# Patient Record
Sex: MALE | Race: BLACK OR AFRICAN AMERICAN | NOT HISPANIC OR LATINO | URBAN - METROPOLITAN AREA
[De-identification: names, ages, dates, MRNs, and addresses within clinical notes are randomized per-mention and may not be internally consistent; named-entity substitution may affect disease eponyms.]

---

## 2021-07-23 ENCOUNTER — INPATIENT (INPATIENT)
Facility: HOSPITAL | Age: 61
LOS: 0 days | Discharge: HOME | End: 2021-07-24
Attending: INTERNAL MEDICINE | Admitting: INTERNAL MEDICINE
Payer: MEDICAID

## 2021-07-23 VITALS
HEART RATE: 101 BPM | OXYGEN SATURATION: 97 % | DIASTOLIC BLOOD PRESSURE: 104 MMHG | TEMPERATURE: 99 F | RESPIRATION RATE: 20 BRPM | HEIGHT: 69 IN | SYSTOLIC BLOOD PRESSURE: 200 MMHG | WEIGHT: 220.02 LBS

## 2021-07-23 LAB
ALBUMIN SERPL ELPH-MCNC: 3.3 G/DL — SIGNIFICANT CHANGE UP (ref 3.3–5)
ALP SERPL-CCNC: 89 U/L — SIGNIFICANT CHANGE UP (ref 40–120)
ALT FLD-CCNC: 21 U/L — SIGNIFICANT CHANGE UP (ref 12–78)
ANION GAP SERPL CALC-SCNC: 3 MMOL/L — LOW (ref 5–17)
APTT BLD: 33.1 SEC — SIGNIFICANT CHANGE UP (ref 27.5–35.5)
AST SERPL-CCNC: 14 U/L — LOW (ref 15–37)
BASOPHILS # BLD AUTO: 0.03 K/UL — SIGNIFICANT CHANGE UP (ref 0–0.2)
BASOPHILS NFR BLD AUTO: 0.3 % — SIGNIFICANT CHANGE UP (ref 0–2)
BILIRUB SERPL-MCNC: 0.3 MG/DL — SIGNIFICANT CHANGE UP (ref 0.2–1.2)
BUN SERPL-MCNC: 12 MG/DL — SIGNIFICANT CHANGE UP (ref 7–23)
CALCIUM SERPL-MCNC: 9.1 MG/DL — SIGNIFICANT CHANGE UP (ref 8.5–10.1)
CHLORIDE SERPL-SCNC: 100 MMOL/L — SIGNIFICANT CHANGE UP (ref 96–108)
CO2 SERPL-SCNC: 30 MMOL/L — SIGNIFICANT CHANGE UP (ref 22–31)
CREAT SERPL-MCNC: 0.92 MG/DL — SIGNIFICANT CHANGE UP (ref 0.5–1.3)
EOSINOPHIL # BLD AUTO: 0.07 K/UL — SIGNIFICANT CHANGE UP (ref 0–0.5)
EOSINOPHIL NFR BLD AUTO: 0.7 % — SIGNIFICANT CHANGE UP (ref 0–6)
GLUCOSE BLDC GLUCOMTR-MCNC: 194 MG/DL — HIGH (ref 70–99)
GLUCOSE BLDC GLUCOMTR-MCNC: 203 MG/DL — HIGH (ref 70–99)
GLUCOSE BLDC GLUCOMTR-MCNC: 208 MG/DL — HIGH (ref 70–99)
GLUCOSE BLDC GLUCOMTR-MCNC: 269 MG/DL — HIGH (ref 70–99)
GLUCOSE SERPL-MCNC: 258 MG/DL — HIGH (ref 70–99)
HCT VFR BLD CALC: 36.6 % — LOW (ref 39–50)
HGB BLD-MCNC: 11.5 G/DL — LOW (ref 13–17)
IMM GRANULOCYTES NFR BLD AUTO: 0.3 % — SIGNIFICANT CHANGE UP (ref 0–1.5)
INR BLD: 1.11 RATIO — SIGNIFICANT CHANGE UP (ref 0.88–1.16)
LYMPHOCYTES # BLD AUTO: 1.24 K/UL — SIGNIFICANT CHANGE UP (ref 1–3.3)
LYMPHOCYTES # BLD AUTO: 12 % — LOW (ref 13–44)
MCHC RBC-ENTMCNC: 27.2 PG — SIGNIFICANT CHANGE UP (ref 27–34)
MCHC RBC-ENTMCNC: 31.4 GM/DL — LOW (ref 32–36)
MCV RBC AUTO: 86.5 FL — SIGNIFICANT CHANGE UP (ref 80–100)
MONOCYTES # BLD AUTO: 0.65 K/UL — SIGNIFICANT CHANGE UP (ref 0–0.9)
MONOCYTES NFR BLD AUTO: 6.3 % — SIGNIFICANT CHANGE UP (ref 2–14)
NEUTROPHILS # BLD AUTO: 8.32 K/UL — HIGH (ref 1.8–7.4)
NEUTROPHILS NFR BLD AUTO: 80.4 % — HIGH (ref 43–77)
NRBC # BLD: 0 /100 WBCS — SIGNIFICANT CHANGE UP (ref 0–0)
PLATELET # BLD AUTO: 358 K/UL — SIGNIFICANT CHANGE UP (ref 150–400)
POTASSIUM SERPL-MCNC: 3.5 MMOL/L — SIGNIFICANT CHANGE UP (ref 3.5–5.3)
POTASSIUM SERPL-SCNC: 3.5 MMOL/L — SIGNIFICANT CHANGE UP (ref 3.5–5.3)
PROT SERPL-MCNC: 7.9 GM/DL — SIGNIFICANT CHANGE UP (ref 6–8.3)
PROTHROM AB SERPL-ACNC: 12.8 SEC — SIGNIFICANT CHANGE UP (ref 10.6–13.6)
RAPID RVP RESULT: SIGNIFICANT CHANGE UP
RBC # BLD: 4.23 M/UL — SIGNIFICANT CHANGE UP (ref 4.2–5.8)
RBC # FLD: 12.9 % — SIGNIFICANT CHANGE UP (ref 10.3–14.5)
SARS-COV-2 RNA SPEC QL NAA+PROBE: SIGNIFICANT CHANGE UP
SODIUM SERPL-SCNC: 133 MMOL/L — LOW (ref 135–145)
TROPONIN I SERPL-MCNC: <.015 NG/ML — SIGNIFICANT CHANGE UP (ref 0.01–0.04)
TROPONIN I SERPL-MCNC: <.015 NG/ML — SIGNIFICANT CHANGE UP (ref 0.01–0.04)
WBC # BLD: 10.34 K/UL — SIGNIFICANT CHANGE UP (ref 3.8–10.5)
WBC # FLD AUTO: 10.34 K/UL — SIGNIFICANT CHANGE UP (ref 3.8–10.5)

## 2021-07-23 PROCEDURE — 99285 EMERGENCY DEPT VISIT HI MDM: CPT

## 2021-07-23 PROCEDURE — 74174 CTA ABD&PLVS W/CONTRAST: CPT | Mod: 26,MA

## 2021-07-23 PROCEDURE — 71275 CT ANGIOGRAPHY CHEST: CPT | Mod: 26,MA

## 2021-07-23 PROCEDURE — 93306 TTE W/DOPPLER COMPLETE: CPT | Mod: 26

## 2021-07-23 PROCEDURE — 99222 1ST HOSP IP/OBS MODERATE 55: CPT

## 2021-07-23 PROCEDURE — 99233 SBSQ HOSP IP/OBS HIGH 50: CPT

## 2021-07-23 PROCEDURE — 71045 X-RAY EXAM CHEST 1 VIEW: CPT | Mod: 26

## 2021-07-23 PROCEDURE — 93010 ELECTROCARDIOGRAM REPORT: CPT

## 2021-07-23 RX ORDER — AZITHROMYCIN 500 MG/1
TABLET, FILM COATED ORAL
Refills: 0 | Status: DISCONTINUED | OUTPATIENT
Start: 2021-07-23 | End: 2021-07-24

## 2021-07-23 RX ORDER — DEXTROSE 50 % IN WATER 50 %
25 SYRINGE (ML) INTRAVENOUS ONCE
Refills: 0 | Status: DISCONTINUED | OUTPATIENT
Start: 2021-07-23 | End: 2021-07-24

## 2021-07-23 RX ORDER — DOXAZOSIN MESYLATE 4 MG
8 TABLET ORAL AT BEDTIME
Refills: 0 | Status: DISCONTINUED | OUTPATIENT
Start: 2021-07-23 | End: 2021-07-24

## 2021-07-23 RX ORDER — INSULIN GLARGINE 100 [IU]/ML
8 INJECTION, SOLUTION SUBCUTANEOUS AT BEDTIME
Refills: 0 | Status: DISCONTINUED | OUTPATIENT
Start: 2021-07-23 | End: 2021-07-24

## 2021-07-23 RX ORDER — GLUCAGON INJECTION, SOLUTION 0.5 MG/.1ML
1 INJECTION, SOLUTION SUBCUTANEOUS ONCE
Refills: 0 | Status: DISCONTINUED | OUTPATIENT
Start: 2021-07-23 | End: 2021-07-24

## 2021-07-23 RX ORDER — TAMSULOSIN HYDROCHLORIDE 0.4 MG/1
0.4 CAPSULE ORAL AT BEDTIME
Refills: 0 | Status: DISCONTINUED | OUTPATIENT
Start: 2021-07-23 | End: 2021-07-24

## 2021-07-23 RX ORDER — AZITHROMYCIN 500 MG/1
500 TABLET, FILM COATED ORAL ONCE
Refills: 0 | Status: COMPLETED | OUTPATIENT
Start: 2021-07-23 | End: 2021-07-23

## 2021-07-23 RX ORDER — LABETALOL HCL 100 MG
10 TABLET ORAL ONCE
Refills: 0 | Status: COMPLETED | OUTPATIENT
Start: 2021-07-23 | End: 2021-07-23

## 2021-07-23 RX ORDER — ASPIRIN/CALCIUM CARB/MAGNESIUM 324 MG
81 TABLET ORAL DAILY
Refills: 0 | Status: DISCONTINUED | OUTPATIENT
Start: 2021-07-24 | End: 2021-07-24

## 2021-07-23 RX ORDER — SENNA PLUS 8.6 MG/1
1 TABLET ORAL AT BEDTIME
Refills: 0 | Status: DISCONTINUED | OUTPATIENT
Start: 2021-07-23 | End: 2021-07-24

## 2021-07-23 RX ORDER — CLOPIDOGREL BISULFATE 75 MG/1
75 TABLET, FILM COATED ORAL AT BEDTIME
Refills: 0 | Status: DISCONTINUED | OUTPATIENT
Start: 2021-07-23 | End: 2021-07-24

## 2021-07-23 RX ORDER — SODIUM CHLORIDE 9 MG/ML
500 INJECTION INTRAMUSCULAR; INTRAVENOUS; SUBCUTANEOUS ONCE
Refills: 0 | Status: COMPLETED | OUTPATIENT
Start: 2021-07-23 | End: 2021-07-23

## 2021-07-23 RX ORDER — ASPIRIN/CALCIUM CARB/MAGNESIUM 324 MG
325 TABLET ORAL ONCE
Refills: 0 | Status: COMPLETED | OUTPATIENT
Start: 2021-07-23 | End: 2021-07-23

## 2021-07-23 RX ORDER — OXYCODONE HYDROCHLORIDE 5 MG/1
5 TABLET ORAL ONCE
Refills: 0 | Status: DISCONTINUED | OUTPATIENT
Start: 2021-07-23 | End: 2021-07-23

## 2021-07-23 RX ORDER — DOXAZOSIN MESYLATE 4 MG
1 TABLET ORAL
Qty: 0 | Refills: 0 | DISCHARGE

## 2021-07-23 RX ORDER — DEXTROSE 50 % IN WATER 50 %
12.5 SYRINGE (ML) INTRAVENOUS ONCE
Refills: 0 | Status: DISCONTINUED | OUTPATIENT
Start: 2021-07-23 | End: 2021-07-24

## 2021-07-23 RX ORDER — SODIUM CHLORIDE 9 MG/ML
1000 INJECTION, SOLUTION INTRAVENOUS
Refills: 0 | Status: DISCONTINUED | OUTPATIENT
Start: 2021-07-23 | End: 2021-07-24

## 2021-07-23 RX ORDER — INSULIN LISPRO 100/ML
VIAL (ML) SUBCUTANEOUS
Refills: 0 | Status: DISCONTINUED | OUTPATIENT
Start: 2021-07-23 | End: 2021-07-24

## 2021-07-23 RX ORDER — DEXTROSE 50 % IN WATER 50 %
15 SYRINGE (ML) INTRAVENOUS ONCE
Refills: 0 | Status: DISCONTINUED | OUTPATIENT
Start: 2021-07-23 | End: 2021-07-24

## 2021-07-23 RX ORDER — HEPARIN SODIUM 5000 [USP'U]/ML
5000 INJECTION INTRAVENOUS; SUBCUTANEOUS EVERY 12 HOURS
Refills: 0 | Status: DISCONTINUED | OUTPATIENT
Start: 2021-07-23 | End: 2021-07-24

## 2021-07-23 RX ORDER — ATORVASTATIN CALCIUM 80 MG/1
20 TABLET, FILM COATED ORAL AT BEDTIME
Refills: 0 | Status: DISCONTINUED | OUTPATIENT
Start: 2021-07-23 | End: 2021-07-24

## 2021-07-23 RX ORDER — ACETAMINOPHEN 500 MG
975 TABLET ORAL ONCE
Refills: 0 | Status: COMPLETED | OUTPATIENT
Start: 2021-07-23 | End: 2021-07-23

## 2021-07-23 RX ORDER — AZITHROMYCIN 500 MG/1
500 TABLET, FILM COATED ORAL EVERY 24 HOURS
Refills: 0 | Status: DISCONTINUED | OUTPATIENT
Start: 2021-07-24 | End: 2021-07-24

## 2021-07-23 RX ORDER — SODIUM CHLORIDE 9 MG/ML
250 INJECTION INTRAMUSCULAR; INTRAVENOUS; SUBCUTANEOUS ONCE
Refills: 0 | Status: COMPLETED | OUTPATIENT
Start: 2021-07-23 | End: 2021-07-23

## 2021-07-23 RX ORDER — GUAIFENESIN/DEXTROMETHORPHAN 600MG-30MG
10 TABLET, EXTENDED RELEASE 12 HR ORAL EVERY 8 HOURS
Refills: 0 | Status: DISCONTINUED | OUTPATIENT
Start: 2021-07-23 | End: 2021-07-24

## 2021-07-23 RX ORDER — METOPROLOL TARTRATE 50 MG
25 TABLET ORAL DAILY
Refills: 0 | Status: DISCONTINUED | OUTPATIENT
Start: 2021-07-23 | End: 2021-07-23

## 2021-07-23 RX ORDER — OXYCODONE HYDROCHLORIDE 5 MG/1
5 TABLET ORAL EVERY 6 HOURS
Refills: 0 | Status: DISCONTINUED | OUTPATIENT
Start: 2021-07-23 | End: 2021-07-24

## 2021-07-23 RX ADMIN — AZITHROMYCIN 255 MILLIGRAM(S): 500 TABLET, FILM COATED ORAL at 18:45

## 2021-07-23 RX ADMIN — Medication 8 MILLIGRAM(S): at 21:45

## 2021-07-23 RX ADMIN — OXYCODONE HYDROCHLORIDE 5 MILLIGRAM(S): 5 TABLET ORAL at 06:44

## 2021-07-23 RX ADMIN — Medication 975 MILLIGRAM(S): at 05:26

## 2021-07-23 RX ADMIN — SENNA PLUS 1 TABLET(S): 8.6 TABLET ORAL at 21:27

## 2021-07-23 RX ADMIN — Medication 975 MILLIGRAM(S): at 04:57

## 2021-07-23 RX ADMIN — SODIUM CHLORIDE 250 MILLILITER(S): 9 INJECTION INTRAMUSCULAR; INTRAVENOUS; SUBCUTANEOUS at 06:12

## 2021-07-23 RX ADMIN — OXYCODONE HYDROCHLORIDE 5 MILLIGRAM(S): 5 TABLET ORAL at 06:12

## 2021-07-23 RX ADMIN — OXYCODONE HYDROCHLORIDE 5 MILLIGRAM(S): 5 TABLET ORAL at 12:25

## 2021-07-23 RX ADMIN — INSULIN GLARGINE 8 UNIT(S): 100 INJECTION, SOLUTION SUBCUTANEOUS at 21:27

## 2021-07-23 RX ADMIN — Medication 1: at 11:00

## 2021-07-23 RX ADMIN — ATORVASTATIN CALCIUM 20 MILLIGRAM(S): 80 TABLET, FILM COATED ORAL at 21:27

## 2021-07-23 RX ADMIN — TAMSULOSIN HYDROCHLORIDE 0.4 MILLIGRAM(S): 0.4 CAPSULE ORAL at 21:27

## 2021-07-23 RX ADMIN — HEPARIN SODIUM 5000 UNIT(S): 5000 INJECTION INTRAVENOUS; SUBCUTANEOUS at 17:03

## 2021-07-23 RX ADMIN — Medication 10 MILLIGRAM(S): at 03:00

## 2021-07-23 RX ADMIN — Medication 2: at 16:53

## 2021-07-23 RX ADMIN — Medication 325 MILLIGRAM(S): at 06:33

## 2021-07-23 NOTE — ED ADULT NURSE NOTE - OBJECTIVE STATEMENT
patient alert and oriented pr presents to the ED for chest pain.  Patient was asleep when he was woken up by pain to the left side of his chest.  He denies dyspnea, dizziness, back pain.  Patient's BP very elevated in ER, says he did not take his BP meds in the last day.  Denies pain to back, jaw, or arm.

## 2021-07-23 NOTE — CONSULT NOTE ADULT - ASSESSMENT
60M w/ HTN HLD DM, hx TIA p/w chest pain.  -pt with multiple risk factors for CAD, also with recent VILLA.   -states he had recent testing done at [facility], would try to obtain records, if unable, would trend CE with serial EKGs  -given nature of symptoms, if can not obtain recent stress, would consider repeat study here prior to discharge if pt agreeable  -cont asa, plavix, which pt was already -for hx of tia.   -cont BP control  -will follow with you  -d/w medicine team  -I tried to call [facility], unable to reach him.

## 2021-07-23 NOTE — ED PROVIDER NOTE - CLINICAL SUMMARY MEDICAL DECISION MAKING FREE TEXT BOX
Patient with left sided chest pain, poorly controlled BP.  Labs noted for hyperglycemia, no concern for DKA.  Trop negative.  Patient started having pain in his back and has been requiring multiple doses of pain meds.  Patient only received 250 ml of IVF for CT imaging.  Negative dissection study.  Given risk factors and uncontrolled BP, decision made to admit to cardiac observation unit, however admission pushed to morning team due to ER volumes.  Patient given aspirin.  Patient signed out to incoming physician Dr. Cartagena.  All decisions regarding the progression of care will be made at their discretion.

## 2021-07-23 NOTE — H&P ADULT - NSHPPHYSICALEXAM_GEN_ALL_CORE
PHYSICAL EXAMINATION:  Vital Signs Last 24 Hrs  T(C): 37 (23 Jul 2021 07:34), Max: 37.4 (23 Jul 2021 02:08)  T(F): 98.6 (23 Jul 2021 07:34), Max: 99.4 (23 Jul 2021 02:08)  HR: 65 (23 Jul 2021 07:34) (64 - 110)  BP: 139/76 (23 Jul 2021 07:34) (139/76 - 203/90)  BP(mean): --  RR: 14 (23 Jul 2021 07:34) (13 - 20)  SpO2: 97% (23 Jul 2021 07:34) (97% - 98%)  CAPILLARY BLOOD GLUCOSE            GENERAL: NAD, well-groomed,  HEAD:  atraumatic, normocephalic  EYES: sclera anicteric  ENMT: mucous membranes moist  NECK: supple, No JVD  CHEST/LUNG: clear to auscultation bilaterally;    no      rales   ,   no rhonchi,   HEART: normal S1, S2  ABDOMEN: BS+, soft, ND, NT   EXTREMITIES:    no    edema    b/l LEs  NEURO: awake, ,     moves all extremities  SKIN: no     rash

## 2021-07-23 NOTE — PATIENT PROFILE ADULT - IS THERE A SUSPICION OF ABUSE/NEGLIGENCE?
Dr Smith calling to see what labs PCP was looking to have drawn- unable to find.     Fasting since 8 am and passed out on her way to the clinic- was brought to  ER     Transferred to [place] [facility] and called clinic [facility]en to assist further.     Millie Ann RN [facility] Triage/Med Refill 9/25/2019 4:48 PM   
no

## 2021-07-23 NOTE — ED PROVIDER NOTE - OBJECTIVE STATEMENT
Pertinent PMH/PSH/FHx/SHx and Review of Systems contained within:  Patient presents to the ED for chest pain.  Patient was asleep when he was woken up by pain to the left side of his chest.  He denies dyspnea, dizziness, back pain.  Patient's BP very elevated in ER.  Denies pain to back, jaw, or arm.     Relevant PMHx/SHx/SOCHx/FAMH:  HTN, DM, +family h/o MI in mother  Patient denies EtOH/tobacco/illicit substance use.    ROS: No fever/chills, No headache/photophobia/eye pain/changes in vision, No ear pain/sore throat/dysphagia, No palpitations, no SOB/cough/wheeze/stridor, No abdominal pain, No N/V/D/melena, no dysuria/frequency/discharge, No neck/back pain, no rash, no changes in neurological status/function. Pertinent PMH/PSH/FHx/SHx and Review of Systems contained within:  Patient presents to the ED for chest pain.  Patient was asleep when he was woken up by pain to the left side of his chest.  He denies dyspnea, dizziness, back pain.  Patient's BP very elevated in ER, says he did not take his BP meds in the last day.  Denies pain to back, jaw, or arm.      Relevant PMHx/SHx/SOCHx/FAMH:  HTN, DM, TIA, BPH, +family h/o MI in mother  Patient denies EtOH/tobacco/illicit substance use.    ROS: No fever/chills, No headache/photophobia/eye pain/changes in vision, No ear pain/sore throat/dysphagia, No palpitations, no SOB/cough/wheeze/stridor, No abdominal pain, No N/V/D/melena, no dysuria/frequency/discharge, No neck/back pain, no rash, no changes in neurological status/function.

## 2021-07-23 NOTE — H&P ADULT - ASSESSMENT
60 yr m   h/o HTN.  HLD. DM/  bph, TIA    	   presents to the ED for chest pain.            he was woken up by pain ,  left side of his chest.           He denies dyspnea, dizziness, back pain.            *  Chest pain         tele  monitoring        follow  troponin, initial troponin wa s normal        card eval         echo   pending       on asa/ plavix/  lipitor         will need  ischemic w/p, given risk factors, of  htn/ dm/prior  tia,  male   *  HTN/  HLD/  TIA         on toprol/ lipitor/ plavix        *   BPH         on flomax    *     on  dvt ppx/  s/q  heparin     *   Ct angio,  no dissection ,  focal consolidation in lungs/  trace b/l pl effusions. bph         pulm eval,   doubt pt needs  antibioticus      *     DM. follow fs        on  lantus  now           rad< from: CT Angio Chest PE Protocol w/ IV Cont (07.23.21 @ 05:46) >  IMPRESSION:  No aortic aneurysm or dissection.  Focal areas of peripheral nodular consolidation in the left upper lobe and right lower lobe and trace bilateral pleural effusions which may be infectious or inflammatory. Repeat examination in 4-6 weeks is recommended to assess for interval resolution.  Markedly enlarged prostate gland. Distendedurinary bladder with layering bladder calculi and minimal wall thickening likely secondary to chronic outlet obstruction  < end of copied text >       60 yr m   h/o HTN.  HLD. DM/  bph, TIA    	   presents to the ED for chest pain.            he was woken up by pain ,  left side of his chest.           He denies dyspnea, dizziness, back pain.            *  Chest pain/  since resolved         tele  monitoring         follow  troponin, initial troponin wa s normal        card   called by erl          echo   pending         on asa/ plavix/  lipitor    pt states he had  a stress test  few months  ago, normal          defer ischemic w/p, to card,  given risk factors, of  htn/ dm/prior  tia,  male   *  HTN/  HLD/  TIA         on toprol/ lipitor/ plavix        *   BPH         on flomax    *     on  dvt ppx/  s/q  heparin     *   Ct angio,  no dissection ,  focal consolidation in lungs/  trace b/l pl effusions. bph         pulm eval,   doubt pt needs  antibioticus      *     DM. follow fs       on metformin/  glpizide at home        on  lantus  now             rad< from: CT Angio Chest PE Protocol w/ IV Cont (07.23.21 @ 05:46) >  IMPRESSION:  No aortic aneurysm or dissection.  Focal areas of peripheral nodular consolidation in the left upper lobe and right lower lobe and trace bilateral pleural effusions which may be infectious or inflammatory. Repeat examination in 4-6 weeks is recommended to assess for interval resolution.  Markedly enlarged prostate gland. Distendedurinary bladder with layering bladder calculi and minimal wall thickening likely secondary to chronic outlet obstruction  < end of copied text >       60 yr m   h/o HTN.  HLD. DM/  bph, TIA    	   presents to the ED for chest pain.            he was woken up by pain ,  left side of his chest.           He denies dyspnea, dizziness, back pain.            *  Chest pain/  since resolved         tele  monitoring         follow  troponin, initial troponin was  normal        card   called by autumn          echo   pending         on asa/ plavix/  lipitor    pt states he had  a stress test  few months  ago, normal          defer ischemic w/p, to card,  given risk factors, of  htn/ dm/prior  tia,  male   *  HTN/  HLD/  TIA         on toprol/ lipitor/ plavix        *   BPH         on flomax    *     on  dvt ppx/  s/q  heparin     *   Ct angio,  no dissection ,  focal consolidation in lungs/  trace b/l pl effusions. bph         pulm eval,   doubt pt needs  antibioticus      *     DM. follow fs       on metformin/  glpizide at home        on  lantus  now    discussed  with card. awiating  nuclear stress  test. ordered             rad< from: CT Angio Chest PE Protocol w/ IV Cont (07.23.21 @ 05:46) >  IMPRESSION:  No aortic aneurysm or dissection.  Focal areas of peripheral nodular consolidation in the left upper lobe and right lower lobe and trace bilateral pleural effusions which may be infectious or inflammatory. Repeat examination in 4-6 weeks is recommended to assess for interval resolution.  Markedly enlarged prostate gland. Distendedurinary bladder with layering bladder calculi and minimal wall thickening likely secondary to chronic outlet obstruction  < end of copied text >       60 yr m   h/o HTN.  HLD. DM/  bph, TIA    	   presents to the ED for chest pain.            he was woken up by pain ,  left side of his chest.           He denies dyspnea, dizziness, back pain.            *  Chest pain/  since resolved         tele  monitoring         follow  troponin, initial troponin was  normal        card   called by er          echo   pending         on asa/ plavix/  lipitor    pt states he had  a stress test  few months  ago, normal per pt/ unable to obtain result at present          defer ischemic w/p, to card,  given risk factors, of  htn/ dm/prior  tia,  male   *  HTN/  HLD/  TIA         on toprol/ lipitor/ plavix        *   BPH         on flomax    *     on  dvt ppx/  s/q  heparin     *   Ct angio,  no dissection ,  focal consolidation in lungs/  trace b/l pl effusions. bph         pulm eval,   doubt pt needs  antibioticus / defer to pulm megan hewitt     *     DM. follow fs       on metformin/  glpizide at home        on  lantus  now      discussed  with card. awiating  nuclear stress  test. ordered           rad< from: CT Angio Chest PE Protocol w/ IV Cont (07.23.21 @ 05:46) >  IMPRESSION:  No aortic aneurysm or dissection.  Focal areas of peripheral nodular consolidation in the left upper lobe and right lower lobe and trace bilateral pleural effusions which may be infectious or inflammatory. Repeat examination in 4-6 weeks is recommended to assess for interval resolution.  Markedly enlarged prostate gland. Distendedurinary bladder with layering bladder calculi and minimal wall thickening likely secondary to chronic outlet obstruction  < end of copied text >

## 2021-07-23 NOTE — ED PROVIDER NOTE - PHYSICAL EXAMINATION
Gen: Alert, anxious, mild distress, well appearing  Head: NC, AT, PERRL, EOMI, normal lids/conjunctiva  ENT: normal hearing, patent oropharynx without erythema/exudate, uvula midline  Neck: +supple, no tenderness/meningismus/JVD, +Trachea midline  Pulm: Bilateral BS, normal resp effort, no wheeze/stridor/retractions  CV: RRR, no M/R/G, +dist pulses  Abd: soft, NT/ND, Negative [place] signs, +BS, no palpable masses  Mskel: no edema/erythema/cyanosis  Skin: no rash, warm/dry  Neuro: AAOx3, no apparent sensory/motor deficits, coordination intact

## 2021-07-23 NOTE — H&P ADULT - NSHPLABSRESULTS_GEN_ALL_CORE
LABS:                        11.5   10.34 )-----------( 358      ( 23 Jul 2021 03:12 )             36.6     07-23    133<L>  |  100  |  12  ----------------------------<  258<H>  3.5   |  30  |  0.92    Ca    9.1      23 Jul 2021 03:12    TPro  7.9  /  Alb  3.3  /  TBili  0.3  /  DBili  x   /  AST  14<L>  /  ALT  21  /  AlkPhos  89  07-23    PT/INR - ( 23 Jul 2021 03:12 )   PT: 12.8 sec;   INR: 1.11 ratio         PTT - ( 23 Jul 2021 03:12 )  PTT:33.1 sec  CARDIAC MARKERS ( 23 Jul 2021 03:12 )  <.015 ng/mL / x     / x     / x     / x

## 2021-07-23 NOTE — H&P ADULT - HISTORY OF PRESENT ILLNESS
60 yr m   h/o HTN.  HLD. DM/  bph, TIA    	Patient presents to the ED for chest pain.        Patient was asleep when he was woken up by pain to the left side of his chest.        He denies dyspnea, dizziness, back pain.        Patient's BP   wa s high on  arrival,  as  he did not take his BP meds      .  Denies pain to back, jaw, or arm.

## 2021-07-23 NOTE — H&P ADULT - NSHPREVIEWOFSYSTEMS_GEN_ALL_CORE
REVIEW OF SYSTEMS:  GEN: no fever,    no chills  RESP: no SOB,   no cough  CVS:  chest pain,   no palpitations  GI: no abdominal pain,   no nausea,   no vomiting,   no constipation,   no diarrhea  : no dysuria,   no frequency  NEURO: no headache,   no dizziness  PSYCH: no depression,   not anxious  Derm : no rash

## 2021-07-23 NOTE — CONSULT NOTE ADULT - SUBJECTIVE AND OBJECTIVE BOX
Patient is a 60y old  Male who presents with a chief complaint of cp (2021 12:00)    HPI:    60 yr m with HTN.  HLD. DM, TIA, BPH, Obesity   Was asleep when he was woken up by pain to the left side of his chest. Reports having cough for four days, had sore throat on 1st day also. Denies fever, chills, significant sputum production or SOB.  In ED with BP of 200/101,  and temperature of 99.4   Denies tobacco or substance abuse.    PAST MEDICAL & SURGICAL HISTORY:  HTN (hypertension)    DM (diabetes mellitus)    SOCIAL HISTORY: BMI (kg/m2): 29.3 ( @ 14:05)    Allergies  penicillin (Chills)    MEDICATIONS  (STANDING):  aspirin enteric coated 81 milliGRAM(s) Oral daily  atorvastatin 20 milliGRAM(s) Oral at bedtime  clopidogrel Tablet 75 milliGRAM(s) Oral at bedtime  dextrose 40% Gel 15 Gram(s) Oral once  dextrose 5%. 1000 milliLiter(s) (50 mL/Hr) IV Continuous <Continuous>  dextrose 5%. 1000 milliLiter(s) (100 mL/Hr) IV Continuous <Continuous>  dextrose 50% Injectable 25 Gram(s) IV Push once  dextrose 50% Injectable 12.5 Gram(s) IV Push once  dextrose 50% Injectable 25 Gram(s) IV Push once  doxazosin 8 milliGRAM(s) Oral at bedtime  glucagon  Injectable 1 milliGRAM(s) IntraMuscular once  heparin   Injectable 5000 Unit(s) SubCutaneous every 12 hours  insulin glargine Injectable (LANTUS) 8 Unit(s) SubCutaneous at bedtime  insulin lispro (ADMELOG) corrective regimen sliding scale   SubCutaneous three times a day before meals  tamsulosin 0.4 milliGRAM(s) Oral at bedtime    MEDICATIONS  (PRN):  oxyCODONE    IR 5 milliGRAM(s) Oral every 6 hours PRN Moderate Pain (4 - 6)    REVIEW OF SYSTEMS:    Constitutional:            No fever, weight loss or fatigue  HEENT:                    sore throat  Respiratory:              cough  Cardiovascular:           No chest pain, palpitations  Gastrointestinal:        No abdominal or epigastric pain. No N/V/diarrhea or hematemesis  Genitourinary:            No dysuria, frequency, hematuria or incontinence  SKIN:                             no rash  Musculoskeletal:        No joint pain or swelling  Extremities:                No swelling  Neurological:              No headaches  Psychiatric:                 No depression, anxiety    Vital Signs Last 24 Hrs  T(C): 37.1 (2021 16:40), Max: 37.4 (2021 02:08)  T(F): 98.8 (2021 16:40), Max: 99.4 (2021 02:08)  HR: 66 (2021 16:40) (61 - 110)  BP: 152/80 (2021 16:40) (139/65 - 203/90)  BP(mean): --  RR: 18 (2021 16:40) (13 - 20)  SpO2: 97% (2021 16:40) (97% - 100%)    PHYSICAL EXAM:  GEN:         Awake, responsive and comfortable.  HEENT:    Normal.    RESP:        no wheezing.  CVS:          Regular rate and rhythm.   ABD:         Soft, non-tender, non-distended;   :             No costovertebral angle tenderness  SKIN:           Warm and dry.  EXTR:            No clubbing, cyanosis or edema  CNS:              Intact sensory and motor function.  PSYCH:        cooperative, no anxiety or depression    LABS:                        11.5   10.34 )-----------( 358      ( 2021 03:12 )             36.6     07-23    133<L>  |  100  |  12  ----------------------------<  258<H>  3.5   |  30  |  0.92    Ca    9.1      2021 03:12    TPro  7.9  /  Alb  3.3  /  TBili  0.3  /  DBili  x   /  AST  14<L>  /  ALT  21  /  AlkPhos  89  07-23    PT/INR - ( 2021 03:12 )   PT: 12.8 sec;   INR: 1.11 ratio         PTT - ( 2021 03:12 )  PTT:33.1 sec    EKG: sinus    RADIOLOGY & ADDITIONAL STUDIES:  < from: CT Angio Chest PE Protocol w/ IV Cont (21 @ 05:46) >  EXAM:  CT ANGIO CHEST PULM [facility]                          EXAM:  CT ANGIO ABD PELV (W)AW IC                          PROCEDURE DATE:  2021      INTERPRETATION:  CLINICAL INFORMATION: Chest and lower back pain with high blood pressure.    COMPARISON: None.    CONTRAST/COMPLICATIONS:  IV Contrast: 90 cc of Omnipaque 350.  10 cc discarded.  Oral Contrast: None.  Complications: No adverse reactions at the time of the exam.    PROCEDURE:  CT Angiography of the Chest, Abdomen and Pelvis.  Precontrast imaging was performed through the chest followed by arterial phase imaging of the chest, abdomen and pelvis.  Sagittal and coronal reformats were performed as well as 3D (MIP) reconstructions.    FINDINGS:  CHEST:  LUNGS AND LARGE AIRWAYS: Patent central airways. Focal areas of peripheral nodular consolidation in the left upper lobe and right lower lobe. Bibasilar dependent atelectasis.  PLEURA: Trace bilateral pleural effusions.  VESSELS: Thoracic aorta is normal in caliber. No evidence of thoracic aortic intramural hematoma. No thoracic aortic dissection flap. Mild atherosclerotic calcification of the thoracic aorta. Main pulmonary artery is not dilated. Adequate pulmonary arterial opacification. No pulmonary embolism withinthe confines of this exam.  HEART: Heart is borderline enlarged. No pericardial effusion.  MEDIASTINUM AND LANDON: No lymphadenopathy.  CHEST WALL AND LOWER NECK: Mild symmetric bilateral gynecomastia.    ABDOMEN AND PELVIS:  LIVER: Mild hepatic steatosis.  BILE DUCTS: Normal caliber.  GALLBLADDER: Within normal limits.  SPLEEN: Within normal limits.  PANCREAS: Within normal limits.  ADRENALS: Within normal limits.  KIDNEYS/URETERS: Kidneys enhance symmetrically without hydronephrosis. Nonobstructing right intrarenal calculus.    BLADDER: Mildly distended with layering bladder calculi and minimal wall thickening.  REPRODUCTIVE ORGANS: Markedly enlarged prostate gland.    BOWEL: No bowel obstruction or overt bowel wall thickening. Appendix is normal.  PERITONEUM: No ascites or pneumoperitoneum. No mesenteric lymphadenopathy.  VESSELS: Normal caliber abdominal aorta. No abdominal aortic dissection flap. Patent celiac artery, SMA, renal arteries, and CHRISTOPHER.  RETROPERITONEUM/LYMPH NODES: No lymphadenopathy.  ABDOMINAL WALL: Tiny fat-containing umbilical hernia. Postsurgical changes in the left groin.  BONES: Degenerative changes of the spine.    IMPRESSION:  No aortic aneurysm or dissection.    Focal areas of peripheral nodular consolidation in the left upper lobe and right lower lobe and trace bilateral pleural effusions which may be infectious or inflammatory. Repeat examination in 4-6 weeks is recommended to assess for interval resolution.    Markedly enlarged prostate gland. Distendedurinary bladder with layering bladder calculi and minimal wall thickening likely secondary to chronic outlet obstruction.    CRUZITO LIU DO; Attending Radiologist  This document has been electronically signed. 2021  6:05AM  < from: TTE Echo Complete w/o Contrast w/ Doppler (21 @ 15:45) >   EXAM:  ECHO TTE WO CON COMP W DOPP      PROCEDURE DATE:  2021      INTERPRETATION:  TRANSTHORACIC ECHOCARDIOGRAM REPORT    Patient Name:   JAMES RODRIGUEZ Patient Location: [facility] Rec #:  [id number]       Accession #:      [id number]  Account #:                       Height:           68.9 in 175.0 cm  YOB: 1960        Weight:           220.5 lb 100.00 kg  Patient Age:    60 years         BSA:              2.15 m²  Patient Gender: M                BP:     139/76 mmHg      Date of Exam:        2021 3:45:22 PM  Sonographer:         JAYLEEN  Referring Physician: MANUEL    Procedure:   2D Echo/Doppler/Color Doppler Complete.  Indications: Abnormal electrocardiogram [ECG] [EKG] - R94.31  Diagnosis:   R94.31    2D AND M-MODE MEASUREMENTS (normal ranges within parentheses):  Left                 Normal   Aorta/Left            Normal  Ventricle:                    Atrium:  IVSd (2D):    1.07  (0.7-1.1) Aortic Root    3.23  (2.4-3.7)    cm             (2D):           cm  LVPWd (2D):   0.98  (0.7-1.1) Left Atrium    3.64  (1.9-4.0)                 cm             (2D):           cm  LVIDd (2D):   4.50  (3.4-5.7) LA Vol Index   37.2                 cm             (A4C):        ml/m²  LVIDs (2D):   3.11            LA Vol Index   43.6                 cm             (A2C):        ml/m²  LV FS (2D):   30.9   (>25%)   LA Vol Index   41.9                  %             (BP):         ml/m²  Relative Wall 0.43   (<0.42)  Right  Thickness                    Ventricle:  LVMI 73.76 g/m2               TAPSE:          1.82 cm    LV DIASTOLIC FUNCTION:  MV Peak E: 0.84 m/s e', MV Sarika:  0.08 m/s  MV Peak A: 0.73 m/s E/e' Ratio:  11.13  E/A Ratio: 1.15     Decel Time:  193 msec  Septal e'  0.1 m/s  Septal E/e'  11.6  Lateral e' 0.1 m/s  Lateral E/e' 9.8    SPECTRAL DOPPLER ANALYSIS (where applicable):  Mitral Valve:  MV P1/2 Time: 56.08 msec  MV Area, PHT: 3.92 cm²    Aortic Valve: AoV Max Meño: 1.63 m/s AoV Peak PG: 10.7 mmHg AoV Mean P.9 mmHg    LVOT Vmax: 1.16 m/s LVOT VTI: 0.275 m LVOT Diameter: 2.15 cm    AoV Area, Vmax: 2.57 cm² AoV Area, VTI: 3.34 cm² AoV Area, Vmn: 2.22 cm²    Tricuspid Valve and PA/RV Systolic Pressure: TR Max Velocity: 2.67 m/s RA Pressure: 5 mmHg RVSP/PASP: 33.5 mmHg    PHYSICIAN INTERPRETATION:  Left Ventricle: Increased relative wall thickness with normal mass index consistent with left ventricular concentric remodeling.  Global LV systolic function was normal. Left ventricular ejection fraction, by visual estimation, is 55 to 60%. Spectral Doppler shows impaired relaxation pattern of left ventricular myocardial filling (Grade I diastolic dysfunction).  Right Ventricle: Normal right ventricular size and function.  Left Atrium: Moderately enlarged left atrium.  Right Atrium: The right atrium is normal in size.  Pericardium: There is no evidence of pericardial effusion.  Mitral Valve: Structurally normal mitral valve, with normal leaflet excursion. Trace mitral valve regurgitation is seen.  Tricuspid Valve: Structurally normal tricuspid valve, with normal leaflet excursion. Mild tricuspid regurgitation is visualized.  Aortic Valve: Normal trileaflet aortic valve with normal opening. The aortic valve is trileaflet. Peak transaortic gradient equals 10.7 mmHg, mean transaortic gradient equals 4.9 mmHg, the calculated aortic valve area equals 3.34 cm² by the continuity equation consistent with normally opening aortic valve. No evidence of aortic valve regurgitation is seen.  Pulmonic Valve: Structurally normal pulmonic valve, with normal leaflet excursion. No indication of pulmonic valve regurgitation.  Aorta: The aortic root is normal in size and structure.  Venous: The inferior vena cava was normal sized, with respiratory size variation greater than 50%.    Summary:   1. Normal global left ventricular systolic function.   2. Left ventricular ejection fraction, by visual estimation, is 55 to 60%.   3. Increased relative wall thickness with normal mass index consistent with left ventricular concentric remodeling.   4. Spectral Doppler shows impaired relaxation pattern of left ventricular myocardial filling (Grade I diastolic dysfunction).   5. Moderately enlarged left atrium.   6. Structurally normal mitral valve, with normal leaflet excursion.   7. Trace mitral valve regurgitation.   8. Normal right ventricular size and function.   9. Mild tricuspid regurgitation.  10. Normal trileaflet aortic valve with normal opening.    Hardik Morrow MD [facility]  Electronically signed on 2021 at 4:26:34 PM    This document has been electronically signed. 2021  3:45PM    ASSESSMENT AND PLAN:  ·	Cough  ·	Suspect multifocal pneumonia.  ·	Leukocytosis.  ·	Hyponatremia.  ·	Chest Pain.  ·	Hypertensive urgency.  ·	DM.    Will start on Zithromax, get procalcitonin level.  Legionella and Mycoplasma titres.  Follow electrolytes.  Repeat chest CT in 6-8 weeks.
JAMES RODRIGUEZ  [id number]    Date of Consult:  7/23/21  CHIEF COMPLAINT:  CP  HISTORY OF PRESENT ILLNESS:   60M w/ HTN HLD DM, hx TIA p/w chest pain. He reports being asleep when he was woken up by L chest pain.  denies prev symptoms, denies sob at rest  but does endorse occasional symptoms on exertion. pt denies palpitations, syncope, diaphoresis. pt does state he sees a cardiologist in [facility], Dr Bernardo (tried to call but no answer, office closed). states he had a stress test adn echo one month ago, pt reports that both were normal.  pt now comfortable appearing, states pain is slightly improved. EKG without acute ischemic changes. trop negative x1.    .          Allergies    penicillin (Chills)    Intolerances    	    MEDICATIONS:  aspirin enteric coated 81 milliGRAM(s) Oral daily  clopidogrel Tablet 75 milliGRAM(s) Oral at bedtime  doxazosin 8 milliGRAM(s) Oral at bedtime  heparin   Injectable 5000 Unit(s) SubCutaneous every 12 hours  metoprolol succinate ER 25 milliGRAM(s) Oral daily  tamsulosin 0.4 milliGRAM(s) Oral at bedtime        oxyCODONE    IR 5 milliGRAM(s) Oral every 6 hours PRN      atorvastatin 20 milliGRAM(s) Oral at bedtime  dextrose 40% Gel 15 Gram(s) Oral once  dextrose 50% Injectable 25 Gram(s) IV Push once  dextrose 50% Injectable 12.5 Gram(s) IV Push once  dextrose 50% Injectable 25 Gram(s) IV Push once  glucagon  Injectable 1 milliGRAM(s) IntraMuscular once  insulin glargine Injectable (LANTUS) 8 Unit(s) SubCutaneous at bedtime  insulin lispro (ADMELOG) corrective regimen sliding scale   SubCutaneous three times a day before meals    dextrose 5%. 1000 milliLiter(s) IV Continuous <Continuous>  dextrose 5%. 1000 milliLiter(s) IV Continuous <Continuous>      PAST MEDICAL & SURGICAL HISTORY:  HTN (hypertension)    DM (diabetes mellitus)        FAMILY HISTORY:      SOCIAL HISTORY:    denies tob, etoh, drugs      REVIEW OF SYSTEMS:  See HPI. Otherwise, 10 point ROS done and otherwise negative.    PHYSICAL EXAM:  T(C): 37.1 (07-23-21 @ 10:22), Max: 37.4 (07-23-21 @ 02:08)  HR: 61 (07-23-21 @ 10:36) (61 - 110)  BP: 139/65 (07-23-21 @ 10:36) (139/65 - 203/90)  RR: 15 (07-23-21 @ 10:36) (13 - 20)  SpO2: 97% (07-23-21 @ 10:36) (97% - 99%)  Wt(kg): --  I&O's Summary      Appearance: Normal	  HEENT:   Normal oral mucosa, PERRL, EOMI	  Lymphatic: No lymphadenopathy  Cardiovascular: Normal S1 S2, No JVD, No murmurs, No edema  Respiratory: Lungs clear to auscultation	  Psychiatry: A & O x 3, Mood & affect appropriate  Gastrointestinal:  Soft, Non-tender, + BS	  Skin: No rashes, No ecchymoses, No cyanosis	  Neurologic: Non-focal  Extremities: Normal range of motion, No clubbing, cyanosis       LABS:	 	    CBC Full  -  ( 23 Jul 2021 03:12 )  WBC Count : 10.34 K/uL  Hemoglobin : 11.5 g/dL  Hematocrit : 36.6 %  Platelet Count - Automated : 358 K/uL  Mean Cell Volume : 86.5 fl  Mean Cell Hemoglobin : 27.2 pg  Mean Cell Hemoglobin Concentration : 31.4 gm/dL  Auto Neutrophil # : 8.32 K/uL  Auto Lymphocyte # : 1.24 K/uL  Auto Monocyte # : 0.65 K/uL  Auto Eosinophil # : 0.07 K/uL  Auto Basophil # : 0.03 K/uL  Auto Neutrophil % : 80.4 %  Auto Lymphocyte % : 12.0 %  Auto Monocyte % : 6.3 %  Auto Eosinophil % : 0.7 %  Auto Basophil % : 0.3 %    07-23    133<L>  |  100  |  12  ----------------------------<  258<H>  3.5   |  30  |  0.92    Ca    9.1      23 Jul 2021 03:12    TPro  7.9  /  Alb  3.3  /  TBili  0.3  /  DBili  x   /  AST  14<L>  /  ALT  21  /  AlkPhos  89  07-23      proBNP:   Lipid Profile:   HgA1c:   TSH:       CARDIAC MARKERS:  Troponin I, Serum: <.015 ng/mL (07-23 @ 09:46)  Troponin I, Serum: <.015 ng/mL (07-23 @ 03:12)            TELEMETRY: 	    ECG:  	  RADIOLOGY:  OTHER: 	    PREVIOUS DIAGNOSTIC TESTING:    [ ] Echocardiogram:  [ ]  Catheterization:  [ ] Stress Test:  	  	  ASSESSMENT/PLAN: 	    Hardik Morrow MD

## 2021-07-24 ENCOUNTER — TRANSCRIPTION ENCOUNTER (OUTPATIENT)
Age: 61
End: 2021-07-24

## 2021-07-24 VITALS
TEMPERATURE: 98 F | RESPIRATION RATE: 18 BRPM | SYSTOLIC BLOOD PRESSURE: 166 MMHG | OXYGEN SATURATION: 98 % | HEART RATE: 74 BPM | DIASTOLIC BLOOD PRESSURE: 84 MMHG

## 2021-07-24 LAB
A1C WITH ESTIMATED AVERAGE GLUCOSE RESULT: 9 % — HIGH (ref 4–5.6)
ALBUMIN SERPL ELPH-MCNC: 2.9 G/DL — LOW (ref 3.3–5)
ALP SERPL-CCNC: 87 U/L — SIGNIFICANT CHANGE UP (ref 40–120)
ALT FLD-CCNC: 20 U/L — SIGNIFICANT CHANGE UP (ref 12–78)
ANION GAP SERPL CALC-SCNC: 8 MMOL/L — SIGNIFICANT CHANGE UP (ref 5–17)
AST SERPL-CCNC: 7 U/L — LOW (ref 15–37)
BILIRUB DIRECT SERPL-MCNC: 0.13 MG/DL — SIGNIFICANT CHANGE UP (ref 0.05–0.2)
BILIRUB INDIRECT FLD-MCNC: 0.3 MG/DL — SIGNIFICANT CHANGE UP (ref 0.2–1)
BILIRUB SERPL-MCNC: 0.4 MG/DL — SIGNIFICANT CHANGE UP (ref 0.2–1.2)
BUN SERPL-MCNC: 12 MG/DL — SIGNIFICANT CHANGE UP (ref 7–23)
CALCIUM SERPL-MCNC: 8.5 MG/DL — SIGNIFICANT CHANGE UP (ref 8.5–10.1)
CHLORIDE SERPL-SCNC: 103 MMOL/L — SIGNIFICANT CHANGE UP (ref 96–108)
CO2 SERPL-SCNC: 27 MMOL/L — SIGNIFICANT CHANGE UP (ref 22–31)
COVID-19 SPIKE DOMAIN AB INTERP: NEGATIVE — SIGNIFICANT CHANGE UP
COVID-19 SPIKE DOMAIN ANTIBODY RESULT: 0.4 U/ML — SIGNIFICANT CHANGE UP
CREAT SERPL-MCNC: 0.82 MG/DL — SIGNIFICANT CHANGE UP (ref 0.5–1.3)
ESTIMATED AVERAGE GLUCOSE: 212 MG/DL — HIGH (ref 68–114)
GLUCOSE BLDC GLUCOMTR-MCNC: 162 MG/DL — HIGH (ref 70–99)
GLUCOSE BLDC GLUCOMTR-MCNC: 230 MG/DL — HIGH (ref 70–99)
GLUCOSE SERPL-MCNC: 172 MG/DL — HIGH (ref 70–99)
HCT VFR BLD CALC: 34.5 % — LOW (ref 39–50)
HCT VFR BLD CALC: 36.1 % — LOW (ref 39–50)
HCV AB S/CO SERPL IA: 0.1 S/CO — SIGNIFICANT CHANGE UP (ref 0–0.99)
HCV AB SERPL-IMP: SIGNIFICANT CHANGE UP
HGB BLD-MCNC: 10.9 G/DL — LOW (ref 13–17)
HGB BLD-MCNC: 11.1 G/DL — LOW (ref 13–17)
LEGIONELLA AG UR QL: NEGATIVE — SIGNIFICANT CHANGE UP
MCHC RBC-ENTMCNC: 26.4 PG — LOW (ref 27–34)
MCHC RBC-ENTMCNC: 27.2 PG — SIGNIFICANT CHANGE UP (ref 27–34)
MCHC RBC-ENTMCNC: 30.7 GM/DL — LOW (ref 32–36)
MCHC RBC-ENTMCNC: 31.6 GM/DL — LOW (ref 32–36)
MCV RBC AUTO: 86 FL — SIGNIFICANT CHANGE UP (ref 80–100)
MCV RBC AUTO: 86 FL — SIGNIFICANT CHANGE UP (ref 80–100)
NRBC # BLD: 0 /100 WBCS — SIGNIFICANT CHANGE UP (ref 0–0)
NRBC # BLD: 0 /100 WBCS — SIGNIFICANT CHANGE UP (ref 0–0)
PLATELET # BLD AUTO: 368 K/UL — SIGNIFICANT CHANGE UP (ref 150–400)
PLATELET # BLD AUTO: 371 K/UL — SIGNIFICANT CHANGE UP (ref 150–400)
POTASSIUM SERPL-MCNC: 3.7 MMOL/L — SIGNIFICANT CHANGE UP (ref 3.5–5.3)
POTASSIUM SERPL-SCNC: 3.7 MMOL/L — SIGNIFICANT CHANGE UP (ref 3.5–5.3)
PROCALCITONIN SERPL-MCNC: 0.03 NG/ML — SIGNIFICANT CHANGE UP (ref 0.02–0.1)
PROT SERPL-MCNC: 7.6 GM/DL — SIGNIFICANT CHANGE UP (ref 6–8.3)
RBC # BLD: 4.01 M/UL — LOW (ref 4.2–5.8)
RBC # BLD: 4.2 M/UL — SIGNIFICANT CHANGE UP (ref 4.2–5.8)
RBC # FLD: 13 % — SIGNIFICANT CHANGE UP (ref 10.3–14.5)
RBC # FLD: 13 % — SIGNIFICANT CHANGE UP (ref 10.3–14.5)
SARS-COV-2 IGG+IGM SERPL QL IA: 0.4 U/ML — SIGNIFICANT CHANGE UP
SARS-COV-2 IGG+IGM SERPL QL IA: NEGATIVE — SIGNIFICANT CHANGE UP
SODIUM SERPL-SCNC: 138 MMOL/L — SIGNIFICANT CHANGE UP (ref 135–145)
TROPONIN I SERPL-MCNC: <.015 NG/ML — SIGNIFICANT CHANGE UP (ref 0.01–0.04)
WBC # BLD: 7.87 K/UL — SIGNIFICANT CHANGE UP (ref 3.8–10.5)
WBC # BLD: 8.78 K/UL — SIGNIFICANT CHANGE UP (ref 3.8–10.5)
WBC # FLD AUTO: 7.87 K/UL — SIGNIFICANT CHANGE UP (ref 3.8–10.5)
WBC # FLD AUTO: 8.78 K/UL — SIGNIFICANT CHANGE UP (ref 3.8–10.5)

## 2021-07-24 PROCEDURE — 99238 HOSP IP/OBS DSCHRG MGMT 30/<: CPT

## 2021-07-24 PROCEDURE — 99232 SBSQ HOSP IP/OBS MODERATE 35: CPT

## 2021-07-24 RX ORDER — LISINOPRIL 2.5 MG/1
5 TABLET ORAL DAILY
Refills: 0 | Status: DISCONTINUED | OUTPATIENT
Start: 2021-07-24 | End: 2021-07-24

## 2021-07-24 RX ORDER — ATENOLOL 25 MG/1
1 TABLET ORAL
Qty: 0 | Refills: 0 | DISCHARGE

## 2021-07-24 RX ORDER — LOSARTAN POTASSIUM 100 MG/1
100 TABLET, FILM COATED ORAL DAILY
Refills: 0 | Status: DISCONTINUED | OUTPATIENT
Start: 2021-07-24 | End: 2021-07-24

## 2021-07-24 RX ORDER — ASPIRIN/CALCIUM CARB/MAGNESIUM 324 MG
1 TABLET ORAL
Qty: 0 | Refills: 0 | DISCHARGE
Start: 2021-07-24

## 2021-07-24 RX ORDER — ATENOLOL 25 MG/1
50 TABLET ORAL DAILY
Refills: 0 | Status: DISCONTINUED | OUTPATIENT
Start: 2021-07-24 | End: 2021-07-24

## 2021-07-24 RX ORDER — ATENOLOL 25 MG/1
1 TABLET ORAL
Qty: 0 | Refills: 0 | DISCHARGE
Start: 2021-07-24

## 2021-07-24 RX ORDER — VERAPAMIL HCL 240 MG
1 CAPSULE, EXTENDED RELEASE PELLETS 24 HR ORAL
Qty: 0 | Refills: 0 | DISCHARGE

## 2021-07-24 RX ORDER — IBUPROFEN 200 MG
400 TABLET ORAL ONCE
Refills: 0 | Status: COMPLETED | OUTPATIENT
Start: 2021-07-24 | End: 2021-07-24

## 2021-07-24 RX ORDER — AZITHROMYCIN 500 MG/1
1 TABLET, FILM COATED ORAL
Qty: 3 | Refills: 0
Start: 2021-07-24 | End: 2021-07-26

## 2021-07-24 RX ADMIN — Medication 2: at 11:24

## 2021-07-24 RX ADMIN — HEPARIN SODIUM 5000 UNIT(S): 5000 INJECTION INTRAVENOUS; SUBCUTANEOUS at 05:17

## 2021-07-24 RX ADMIN — Medication 1: at 08:36

## 2021-07-24 RX ADMIN — Medication 400 MILLIGRAM(S): at 04:27

## 2021-07-24 RX ADMIN — Medication 400 MILLIGRAM(S): at 05:21

## 2021-07-24 RX ADMIN — LOSARTAN POTASSIUM 100 MILLIGRAM(S): 100 TABLET, FILM COATED ORAL at 11:24

## 2021-07-24 RX ADMIN — OXYCODONE HYDROCHLORIDE 5 MILLIGRAM(S): 5 TABLET ORAL at 05:15

## 2021-07-24 RX ADMIN — Medication 81 MILLIGRAM(S): at 11:24

## 2021-07-24 RX ADMIN — ATENOLOL 50 MILLIGRAM(S): 25 TABLET ORAL at 11:24

## 2021-07-24 RX ADMIN — OXYCODONE HYDROCHLORIDE 5 MILLIGRAM(S): 5 TABLET ORAL at 03:44

## 2021-07-24 NOTE — DISCHARGE NOTE PROVIDER - NSDCMRMEDTOKEN_GEN_ALL_CORE_FT
Alogliptin 25 mg oral tablet: 1 tab(s) orally once a day  atenolol 50 mg oral tablet: 1 tab(s) orally once a day  atorvastatin 20 mg oral tablet: 1 tab(s) orally once a day  clopidogrel 75 mg oral tablet: 1 tab(s) orally once a day  doxazosin 8 mg oral tablet: 1 tab(s) orally 2 times a day  glipiZIDE 10 mg oral tablet, extended release: 1 tab(s) orally once a day  losartan-hydrochlorothiazide 100 mg-25 mg oral tablet: 1 tab(s) orally once a day  metFORMIN 1000 mg oral tablet: 1 tab(s) orally 2 times a day  tamsulosin 0.4 mg oral capsule: 1 cap(s) orally once a day  verapamil 180 mg/24 hours oral capsule, extended release: 1 cap(s) orally once a day   Alogliptin 25 mg oral tablet: 1 tab(s) orally once a day  aspirin 81 mg oral delayed release tablet: 1 tab(s) orally once a day  atenolol 50 mg oral tablet: 1 tab(s) orally once a day  atorvastatin 20 mg oral tablet: 1 tab(s) orally once a day  azithromycin 500 mg oral tablet: 1 tab(s) orally once a day  clopidogrel 75 mg oral tablet: 1 tab(s) orally once a day  doxazosin 8 mg oral tablet: 1 tab(s) orally 2 times a day  glipiZIDE 10 mg oral tablet, extended release: 1 tab(s) orally once a day  losartan-hydrochlorothiazide 100 mg-25 mg oral tablet: 1 tab(s) orally once a day  metFORMIN 1000 mg oral tablet: 1 tab(s) orally 2 times a day  tamsulosin 0.4 mg oral capsule: 1 cap(s) orally once a day

## 2021-07-24 NOTE — DISCHARGE NOTE PROVIDER - CARE PROVIDER_API CALL
Az Landaverde  CARDIOLOGY  [address] [facility], [place] [address]  Phone: [phone]  Fax: [phone]  Follow Up Time:

## 2021-07-24 NOTE — CHART NOTE - NSCHARTNOTEFT_GEN_A_CORE
called to bedside for pt with chest pain   +cough for 4 days +pleuritic pain worse with inspiration and coughing no n/v  admit with chest pian pneumonia ekg st with flopped t 3. flat t f  p/e  adult male no distress  heent ncat  lungs-clear  cvs -s1s2 rr no mrg  thorax-reproducible pain side ribs mcl ribs 7-9  abdomen soft bs+.nt, nd   ext dp/ pt positive no bk edema   imp reproducible chest pian MS pain possible from coughing   give 1 dose motrin and reevaluate

## 2021-07-24 NOTE — PROGRESS NOTE ADULT - ASSESSMENT
60M w/ HTN HLD DM, hx TIA p/w chest pain.  -pt with multiple risk factors for CAD, also with recent VILLA.   -states he had recent testing done at outpt [facility], would try to obtain records, I personally tried to call out[facility], unable to reach him.   -per pt recent stress test was normal.   -CP worse with movement and improved with oxycodone, may represent MSK etiology  -d/w pt, he does not want to repeat a stress test now as he just had one.  -as symptoms are resolved, TTE showing no wall motion abnormalities, CE negative, and EKG without acute ischemic changes, if pt refuses stress, would d/c to follow up with outpt [facility].   -cont asa, plavix, which pt was already -for hx of tia.   -cont BP control  -will follow with you while he is still here  -repeat ekg if CP returns    
60 yr m   h/o HTN.  HLD. DM/  bph, TIA    	   presents to the ED for chest pain.            he was woken up by pain ,  left side of his chest.           He denies dyspnea, dizziness, back pain.            *  Chest pain/  since resolved         tele  monitoring         follow  troponin, initial troponin was  normal        card   called by er          echo   pending         on asa/ plavix/  lipitor    pt states he had  a stress test  few months  ago, normal per pt/ unable to obtain result at present          defer ischemic w/p, to card,  given risk factors, of  htn/ dm/prior  tia,  male   *  HTN/  HLD/  TIA         on toprol/ lipitor/ plavix        *   BPH         on flomax    *     on  dvt ppx/  s/q  heparin     *   Ct angio,  no dissection ,  focal consolidation in lungs/  trace b/l pl effusions. bph         pulm eval,   doubt pt needs  antibioticus / defer to pulm megan hewitt     *     DM. follow fs       on metformin/  glpizide at home        on  lantus  now   on z max  by pulm       discussed  with card.,  ok  to   d/c  , pt has an out pt card   hb is  10, arabella  rpt stat  and if stable, may be   d/c  and  f/p with  pmd  for anemia  w/p, as  pt wishes to leave           rad< from: CT Angio Chest PE Protocol w/ IV Cont (07.23.21 @ 05:46) >  IMPRESSION:  No aortic aneurysm or dissection.  Focal areas of peripheral nodular consolidation in the left upper lobe and right lower lobe and trace bilateral pleural effusions which may be infectious or inflammatory. Repeat examination in 4-6 weeks is recommended to assess for interval resolution.  Markedly enlarged prostate gland. Distendedurinary bladder with layering bladder calculi and minimal wall thickening likely secondary to chronic outlet obstruction  < end of copied text >

## 2021-07-24 NOTE — DISCHARGE NOTE NURSING/CASE MANAGEMENT/SOCIAL WORK - PATIENT PORTAL LINK FT
You can access the FollowMyHealth Patient Portal offered by [facility] by registering at the following website: [email or web address]. By joining [facility]’s FollowMyHealth portal, you will also be able to view your health information using other applications (apps) compatible with our system.

## 2021-07-24 NOTE — DISCHARGE NOTE PROVIDER - NSDCCPCAREPLAN_GEN_ALL_CORE_FT
PRINCIPAL DISCHARGE DIAGNOSIS  Diagnosis: Chest pain, unspecified type  Assessment and Plan of Treatment: resolved      SECONDARY DISCHARGE DIAGNOSES  Diagnosis: Hypertension, unspecified type  Assessment and Plan of Treatment: stable

## 2021-07-24 NOTE — PROGRESS NOTE ADULT - SUBJECTIVE AND OBJECTIVE BOX
24H hour events:   pt resting, comfortable appearing   no acute events overnight  no complaints this am  pt states CP worse with movement and resolved with oxycodone  MEDICATIONS:  aspirin enteric coated 81 milliGRAM(s) Oral daily  clopidogrel Tablet 75 milliGRAM(s) Oral at bedtime  doxazosin 8 milliGRAM(s) Oral at bedtime  heparin   Injectable 5000 Unit(s) SubCutaneous every 12 hours  tamsulosin 0.4 milliGRAM(s) Oral at bedtime    azithromycin  IVPB      azithromycin  IVPB 500 milliGRAM(s) IV Intermittent every 24 hours    guaifenesin/dextromethorphan Oral Liquid 10 milliLiter(s) Oral every 8 hours PRN    oxyCODONE    IR 5 milliGRAM(s) Oral every 6 hours PRN    senna 1 Tablet(s) Oral at bedtime    atorvastatin 20 milliGRAM(s) Oral at bedtime  dextrose 40% Gel 15 Gram(s) Oral once  dextrose 50% Injectable 25 Gram(s) IV Push once  dextrose 50% Injectable 12.5 Gram(s) IV Push once  dextrose 50% Injectable 25 Gram(s) IV Push once  glucagon  Injectable 1 milliGRAM(s) IntraMuscular once  insulin glargine Injectable (LANTUS) 8 Unit(s) SubCutaneous at bedtime  insulin lispro (ADMELOG) corrective regimen sliding scale   SubCutaneous three times a day before meals    dextrose 5%. 1000 milliLiter(s) IV Continuous <Continuous>  dextrose 5%. 1000 milliLiter(s) IV Continuous <Continuous>          PHYSICAL EXAM:  T(C): 36.8 (07-24-21 @ 03:45), Max: 37.1 (07-23-21 @ 10:22)  HR: 76 (07-24-21 @ 03:45) (61 - 90)  BP: 164/92 (07-24-21 @ 03:45) (139/65 - 172/86)  RR: 16 (07-24-21 @ 03:45) (13 - 18)  SpO2: 97% (07-24-21 @ 03:45) (97% - 100%)  Wt(kg): --  I&O's Summary    23 Jul 2021 07:01  -  24 Jul 2021 07:00  --------------------------------------------------------  IN: 345 mL / OUT: 0 mL / NET: 345 mL        Appearance: Normal	  HEENT:   Normal oral mucosa, PERRL, EOMI	  Lymphatic: No lymphadenopathy  Cardiovascular: Normal S1 S2, No JVD, No murmurs, No edema  Respiratory: Lungs clear to auscultation	  Psychiatry: A & O x 3, Mood & affect appropriate  Gastrointestinal:  Soft, Non-tender, + BS	  Skin: No rashes, No ecchymoses, No cyanosis	  Neurologic: Non-focal  Extremities: Normal range of motion, No clubbing, cyanosis       LABS:	 	    CBC Full  -  ( 24 Jul 2021 07:25 )  WBC Count : 7.87 K/uL  Hemoglobin : 10.9 g/dL  Hematocrit : 34.5 %  Platelet Count - Automated : 371 K/uL  Mean Cell Volume : 86.0 fl  Mean Cell Hemoglobin : 27.2 pg  Mean Cell Hemoglobin Concentration : 31.6 gm/dL  Auto Neutrophil # : x  Auto Lymphocyte # : x  Auto Monocyte # : x  Auto Eosinophil # : x  Auto Basophil # : x  Auto Neutrophil % : x  Auto Lymphocyte % : x  Auto Monocyte % : x  Auto Eosinophil % : x  Auto Basophil % : x    07-24    138  |  103  |  12  ----------------------------<  172<H>  3.7   |  27  |  0.82  07-23    133<L>  |  100  |  12  ----------------------------<  258<H>  3.5   |  30  |  0.92    Ca    8.5      24 Jul 2021 07:25  Ca    9.1      23 Jul 2021 03:12    TPro  7.6  /  Alb  2.9<L>  /  TBili  0.4  /  DBili  .13  /  AST  7<L>  /  ALT  20  /  AlkPhos  87  07-24  TPro  7.9  /  Alb  3.3  /  TBili  0.3  /  DBili  x   /  AST  14<L>  /  ALT  21  /  AlkPhos  89  07-23      proBNP:   Lipid Profile:   HgA1c:   TSH:       CARDIAC MARKERS:  Troponin I, Serum: <.015 ng/mL (07-24 @ 07:25)  Troponin I, Serum: <.015 ng/mL (07-23 @ 09:46)  Troponin I, Serum: <.015 ng/mL (07-23 @ 03:12)            TELEMETRY: 	    ECG:  	  RADIOLOGY:  OTHER: 	    PREVIOUS DIAGNOSTIC TESTING:    [ ] Echocardiogram: < from: TTE Echo Complete w/o Contrast w/ Doppler (07.23.21 @ 15:45) >  PHYSICIAN INTERPRETATION:  Left Ventricle: Increased relative wall thickness with normal mass index consistent with left ventricular concentric remodeling.  Global LV systolic function was normal. Left ventricular ejection fraction, by visual estimation, is 55 to 60%. Spectral Doppler shows impaired relaxation pattern of left ventricular myocardial filling (Grade I diastolic dysfunction).  Right Ventricle: Normal right ventricular size and function.  Left Atrium: Moderately enlarged left atrium.  Right Atrium: The right atrium is normal in size.  Pericardium: There is no evidence of pericardial effusion.  Mitral Valve: Structurally normal mitral valve, with normal leaflet excursion. Trace mitral valve regurgitation is seen.  Tricuspid Valve: Structurally normal tricuspid valve, with normal leaflet excursion. Mild tricuspid regurgitation is visualized.  Aortic Valve: Normal trileaflet aortic valve with normal opening. The aortic valve is trileaflet. Peak transaortic gradient equals 10.7 mmHg, mean transaortic gradient equals 4.9 mmHg, the calculated aortic valve area equals 3.34 cm² by the continuity equation consistent with normally opening aortic valve. No evidence of aortic valve regurgitation is seen.  Pulmonic Valve: Structurally normal pulmonic valve, with normal leaflet excursion. No indication of pulmonic valve regurgitation.  Aorta: The aortic root is normal in size and structure.  Venous: The inferior vena cava was normal sized, with respiratory size variation greater than 50%.        < end of copied text >    [ ]  Catheterization:  [ ] Stress Test:  	  	  ASSESSMENT/PLAN: 	    
 afebrile/  no cp    REVIEW OF SYSTEMS:  GEN: no fever,    no chills  RESP: no SOB,   no cough  CVS: no chest pain,   no palpitations  GI: no abdominal pain,   no nausea,   no vomiting,   no constipation,   no diarrhea  : no dysuria,   no frequency  NEURO: no headache,   no dizziness  PSYCH: no depression,   not anxious  Derm : no rash    MEDICATIONS  (STANDING):  aspirin enteric coated 81 milliGRAM(s) Oral daily  atorvastatin 20 milliGRAM(s) Oral at bedtime  azithromycin  IVPB      azithromycin  IVPB 500 milliGRAM(s) IV Intermittent every 24 hours  clopidogrel Tablet 75 milliGRAM(s) Oral at bedtime  dextrose 40% Gel 15 Gram(s) Oral once  dextrose 5%. 1000 milliLiter(s) (50 mL/Hr) IV Continuous <Continuous>  dextrose 5%. 1000 milliLiter(s) (100 mL/Hr) IV Continuous <Continuous>  dextrose 50% Injectable 25 Gram(s) IV Push once  dextrose 50% Injectable 12.5 Gram(s) IV Push once  dextrose 50% Injectable 25 Gram(s) IV Push once  doxazosin 8 milliGRAM(s) Oral at bedtime  glucagon  Injectable 1 milliGRAM(s) IntraMuscular once  heparin   Injectable 5000 Unit(s) SubCutaneous every 12 hours  insulin glargine Injectable (LANTUS) 8 Unit(s) SubCutaneous at bedtime  insulin lispro (ADMELOG) corrective regimen sliding scale   SubCutaneous three times a day before meals  lisinopril 5 milliGRAM(s) Oral daily  senna 1 Tablet(s) Oral at bedtime  tamsulosin 0.4 milliGRAM(s) Oral at bedtime    MEDICATIONS  (PRN):  guaifenesin/dextromethorphan Oral Liquid 10 milliLiter(s) Oral every 8 hours PRN Cough  oxyCODONE    IR 5 milliGRAM(s) Oral every 6 hours PRN Moderate Pain (4 - 6)      Vital Signs Last 24 Hrs  T(C): 36.8 (24 Jul 2021 03:45), Max: 37.1 (23 Jul 2021 10:22)  T(F): 98.2 (24 Jul 2021 03:45), Max: 98.8 (23 Jul 2021 10:22)  HR: 76 (24 Jul 2021 03:45) (61 - 90)  BP: 164/92 (24 Jul 2021 03:45) (139/65 - 172/86)  BP(mean): --  RR: 16 (24 Jul 2021 03:45) (13 - 18)  SpO2: 97% (24 Jul 2021 03:45) (97% - 100%)  CAPILLARY BLOOD GLUCOSE      POCT Blood Glucose.: 162 mg/dL (24 Jul 2021 07:55)  POCT Blood Glucose.: 269 mg/dL (23 Jul 2021 21:23)  POCT Blood Glucose.: 208 mg/dL (23 Jul 2021 16:30)  POCT Blood Glucose.: 194 mg/dL (23 Jul 2021 10:58)  POCT Blood Glucose.: 203 mg/dL (23 Jul 2021 10:17)    I&O's Summary    23 Jul 2021 07:01  -  24 Jul 2021 07:00  --------------------------------------------------------  IN: 345 mL / OUT: 0 mL / NET: 345 mL        PHYSICAL EXAM:  HEAD:  Atraumatic, Normocephalic  NECK: Supple, No   JVD  CHEST/LUNG:   no     rales,     no,    rhonchi  HEART: Regular rate and rhythm;         murmur  ABDOMEN: Soft, Nontender, ;   EXTREMITIES:   no     edema  NEUROLOGY:  alert    LABS:                        10.9   7.87  )-----------( 371      ( 24 Jul 2021 07:25 )             34.5     07-24    138  |  103  |  12  ----------------------------<  172<H>  3.7   |  27  |  0.82    Ca    8.5      24 Jul 2021 07:25    TPro  7.6  /  Alb  2.9<L>  /  TBili  0.4  /  DBili  .13  /  AST  7<L>  /  ALT  20  /  AlkPhos  87  07-24    PT/INR - ( 23 Jul 2021 03:12 )   PT: 12.8 sec;   INR: 1.11 ratio         PTT - ( 23 Jul 2021 03:12 )  PTT:33.1 sec  CARDIAC MARKERS ( 24 Jul 2021 07:25 )  <.015 ng/mL / x     / x     / x     / x      CARDIAC MARKERS ( 23 Jul 2021 09:46 )  <.015 ng/mL / x     / x     / x     / x      CARDIAC MARKERS ( 23 Jul 2021 03:12 )  <.015 ng/mL / x     / x     / x     / x                            Consultant(s) Notes Reviewed:      Care Discussed with Consultants/Other Providers:

## 2021-07-24 NOTE — DISCHARGE NOTE PROVIDER - HOSPITAL COURSE
· Assessment    60 yr m   h/o HTN.  HLD. DM/  bph, TIA    	   presents to the ED for chest pain.            he was woken up by pain ,  left side of his chest.           He denies dyspnea, dizziness, back pain.            *  Chest pain/  since resolved         tele  monitoring         follow  troponin, initial troponin was  normal        card   called by er          echo   pending         on asa/ plavix/  lipitor    pt states he had  a stress test  few months  ago, normal per pt/ unable to obtain result at present          defer ischemic w/p, to card,  given risk factors, of  htn/ dm/prior  tia,  male   *  HTN/  HLD/  TIA         on toprol/ lipitor/ plavix        *   BPH         on flomax    *     on  dvt ppx/  s/q  heparin     *   Ct angio,  no dissection ,  focal consolidation in lungs/  trace b/l pl effusions. bph         pulm eval,   doubt pt needs  antibioticus / defer to pulm megan hewitt     *     DM. follow fs       on metformin/  glpizide at home        on  lantus  now   on z max  by pulm       discussed  with card.,  ok  to   d/c  , pt has an out pt card   hb is  10, arabella  rpt stat  and if stable, may be   d/c  and  f/p with  pmd  for anemia  w/p, as  pt wishes to leave           rad< from: CT Angio Chest PE Protocol w/ IV Cont (07.23.21 @ 05:46) >  IMPRESSION:  No aortic aneurysm or dissection.  Focal areas of peripheral nodular consolidation in the left upper lobe and right lower lobe and trace bilateral pleural effusions which may be infectious or inflammatory. Repeat examination in 4-6 weeks is recommended to assess for interval resolution.  Markedly enlarged prostate gland. Distendedurinary bladder with layering bladder calculi and minimal wall thickening likely secondary to chronic outlet obstruction  < end of copied text >

## 2021-07-27 LAB
M PNEUMO IGM SER-ACNC: 0.13 INDEX — SIGNIFICANT CHANGE UP (ref 0–0.9)
MYCOPLASMA AG SPEC QL: NEGATIVE — SIGNIFICANT CHANGE UP

## 2021-07-29 DIAGNOSIS — R07.9 CHEST PAIN, UNSPECIFIED: ICD-10-CM

## 2021-07-29 DIAGNOSIS — N32.0 BLADDER-NECK OBSTRUCTION: ICD-10-CM

## 2021-07-29 DIAGNOSIS — E78.5 HYPERLIPIDEMIA, UNSPECIFIED: ICD-10-CM

## 2021-07-29 DIAGNOSIS — J18.9 PNEUMONIA, UNSPECIFIED ORGANISM: ICD-10-CM

## 2021-07-29 DIAGNOSIS — I10 ESSENTIAL (PRIMARY) HYPERTENSION: ICD-10-CM

## 2021-07-29 DIAGNOSIS — I16.0 HYPERTENSIVE URGENCY: ICD-10-CM

## 2021-07-29 DIAGNOSIS — E11.9 TYPE 2 DIABETES MELLITUS WITHOUT COMPLICATIONS: ICD-10-CM

## 2021-07-29 DIAGNOSIS — E87.1 HYPO-OSMOLALITY AND HYPONATREMIA: ICD-10-CM

## 2021-07-29 DIAGNOSIS — E66.9 OBESITY, UNSPECIFIED: ICD-10-CM

## 2021-07-29 DIAGNOSIS — I08.3 COMBINED RHEUMATIC DISORDERS OF MITRAL, AORTIC AND TRICUSPID VALVES: ICD-10-CM

## 2021-07-29 DIAGNOSIS — R07.89 OTHER CHEST PAIN: ICD-10-CM

## 2021-07-29 DIAGNOSIS — N40.0 BENIGN PROSTATIC HYPERPLASIA WITHOUT LOWER URINARY TRACT SYMPTOMS: ICD-10-CM

## 2021-07-29 DIAGNOSIS — Z88.0 ALLERGY STATUS TO PENICILLIN: ICD-10-CM

## 2021-09-23 ENCOUNTER — INPATIENT (INPATIENT)
Facility: HOSPITAL | Age: 61
LOS: 0 days | Discharge: HOME | End: 2021-09-24
Attending: UROLOGY | Admitting: UROLOGY
Payer: MEDICAID

## 2021-09-23 VITALS
HEART RATE: 82 BPM | TEMPERATURE: 98 F | DIASTOLIC BLOOD PRESSURE: 73 MMHG | HEIGHT: 69 IN | RESPIRATION RATE: 20 BRPM | WEIGHT: 220.02 LBS | OXYGEN SATURATION: 97 % | SYSTOLIC BLOOD PRESSURE: 148 MMHG

## 2021-09-23 DIAGNOSIS — I10 ESSENTIAL (PRIMARY) HYPERTENSION: ICD-10-CM

## 2021-09-23 DIAGNOSIS — E11.9 TYPE 2 DIABETES MELLITUS WITHOUT COMPLICATIONS: ICD-10-CM

## 2021-09-23 DIAGNOSIS — N17.9 ACUTE KIDNEY FAILURE, UNSPECIFIED: ICD-10-CM

## 2021-09-23 DIAGNOSIS — N20.0 CALCULUS OF KIDNEY: ICD-10-CM

## 2021-09-23 LAB
ALBUMIN SERPL ELPH-MCNC: 3.5 G/DL — SIGNIFICANT CHANGE UP (ref 3.3–5)
ALP SERPL-CCNC: 91 U/L — SIGNIFICANT CHANGE UP (ref 40–120)
ALT FLD-CCNC: 21 U/L — SIGNIFICANT CHANGE UP (ref 12–78)
ANION GAP SERPL CALC-SCNC: 8 MMOL/L — SIGNIFICANT CHANGE UP (ref 5–17)
APPEARANCE UR: CLEAR — SIGNIFICANT CHANGE UP
AST SERPL-CCNC: 12 U/L — LOW (ref 15–37)
BASOPHILS # BLD AUTO: 0.02 K/UL — SIGNIFICANT CHANGE UP (ref 0–0.2)
BASOPHILS NFR BLD AUTO: 0.1 % — SIGNIFICANT CHANGE UP (ref 0–2)
BILIRUB SERPL-MCNC: 0.5 MG/DL — SIGNIFICANT CHANGE UP (ref 0.2–1.2)
BILIRUB UR-MCNC: NEGATIVE — SIGNIFICANT CHANGE UP
BUN SERPL-MCNC: 11 MG/DL — SIGNIFICANT CHANGE UP (ref 7–23)
CALCIUM SERPL-MCNC: 9.3 MG/DL — SIGNIFICANT CHANGE UP (ref 8.5–10.1)
CHLORIDE SERPL-SCNC: 101 MMOL/L — SIGNIFICANT CHANGE UP (ref 96–108)
CO2 SERPL-SCNC: 28 MMOL/L — SIGNIFICANT CHANGE UP (ref 22–31)
COLOR SPEC: YELLOW — SIGNIFICANT CHANGE UP
CREAT SERPL-MCNC: 1.32 MG/DL — HIGH (ref 0.5–1.3)
DIFF PNL FLD: NEGATIVE — SIGNIFICANT CHANGE UP
EOSINOPHIL # BLD AUTO: 0 K/UL — SIGNIFICANT CHANGE UP (ref 0–0.5)
EOSINOPHIL NFR BLD AUTO: 0 % — SIGNIFICANT CHANGE UP (ref 0–6)
FLUAV AG NPH QL: SIGNIFICANT CHANGE UP
FLUBV AG NPH QL: SIGNIFICANT CHANGE UP
GLUCOSE SERPL-MCNC: 269 MG/DL — HIGH (ref 70–99)
GLUCOSE UR QL: 250 MG/DL
HCT VFR BLD CALC: 35.5 % — LOW (ref 39–50)
HGB BLD-MCNC: 11.2 G/DL — LOW (ref 13–17)
IMM GRANULOCYTES NFR BLD AUTO: 0.3 % — SIGNIFICANT CHANGE UP (ref 0–1.5)
KETONES UR-MCNC: ABNORMAL
LACTATE SERPL-SCNC: 1.5 MMOL/L — SIGNIFICANT CHANGE UP (ref 0.7–2)
LEUKOCYTE ESTERASE UR-ACNC: NEGATIVE — SIGNIFICANT CHANGE UP
LIDOCAIN IGE QN: 59 U/L — LOW (ref 73–393)
LYMPHOCYTES # BLD AUTO: 0.52 K/UL — LOW (ref 1–3.3)
LYMPHOCYTES # BLD AUTO: 3.7 % — LOW (ref 13–44)
MCHC RBC-ENTMCNC: 26.2 PG — LOW (ref 27–34)
MCHC RBC-ENTMCNC: 31.5 GM/DL — LOW (ref 32–36)
MCV RBC AUTO: 83.1 FL — SIGNIFICANT CHANGE UP (ref 80–100)
MONOCYTES # BLD AUTO: 0.84 K/UL — SIGNIFICANT CHANGE UP (ref 0–0.9)
MONOCYTES NFR BLD AUTO: 6 % — SIGNIFICANT CHANGE UP (ref 2–14)
NEUTROPHILS # BLD AUTO: 12.48 K/UL — HIGH (ref 1.8–7.4)
NEUTROPHILS NFR BLD AUTO: 89.9 % — HIGH (ref 43–77)
NITRITE UR-MCNC: NEGATIVE — SIGNIFICANT CHANGE UP
NRBC # BLD: 0 /100 WBCS — SIGNIFICANT CHANGE UP (ref 0–0)
PH UR: 8 — SIGNIFICANT CHANGE UP (ref 5–8)
PLATELET # BLD AUTO: 394 K/UL — SIGNIFICANT CHANGE UP (ref 150–400)
POTASSIUM SERPL-MCNC: 3.9 MMOL/L — SIGNIFICANT CHANGE UP (ref 3.5–5.3)
POTASSIUM SERPL-SCNC: 3.9 MMOL/L — SIGNIFICANT CHANGE UP (ref 3.5–5.3)
PROT SERPL-MCNC: 8.6 GM/DL — HIGH (ref 6–8.3)
PROT UR-MCNC: NEGATIVE MG/DL — SIGNIFICANT CHANGE UP
RBC # BLD: 4.27 M/UL — SIGNIFICANT CHANGE UP (ref 4.2–5.8)
RBC # FLD: 14 % — SIGNIFICANT CHANGE UP (ref 10.3–14.5)
SARS-COV-2 RNA SPEC QL NAA+PROBE: SIGNIFICANT CHANGE UP
SODIUM SERPL-SCNC: 137 MMOL/L — SIGNIFICANT CHANGE UP (ref 135–145)
SP GR SPEC: 1.01 — SIGNIFICANT CHANGE UP (ref 1.01–1.02)
UROBILINOGEN FLD QL: NEGATIVE MG/DL — SIGNIFICANT CHANGE UP
WBC # BLD: 13.9 K/UL — HIGH (ref 3.8–10.5)
WBC # FLD AUTO: 13.9 K/UL — HIGH (ref 3.8–10.5)

## 2021-09-23 PROCEDURE — 71045 X-RAY EXAM CHEST 1 VIEW: CPT | Mod: 26

## 2021-09-23 PROCEDURE — 74177 CT ABD & PELVIS W/CONTRAST: CPT | Mod: 26,MA

## 2021-09-23 PROCEDURE — 99233 SBSQ HOSP IP/OBS HIGH 50: CPT

## 2021-09-23 PROCEDURE — 99285 EMERGENCY DEPT VISIT HI MDM: CPT

## 2021-09-23 PROCEDURE — 93010 ELECTROCARDIOGRAM REPORT: CPT

## 2021-09-23 RX ORDER — INSULIN LISPRO 100/ML
VIAL (ML) SUBCUTANEOUS
Refills: 0 | Status: DISCONTINUED | OUTPATIENT
Start: 2021-09-23 | End: 2021-09-24

## 2021-09-23 RX ORDER — ACETAMINOPHEN 500 MG
650 TABLET ORAL ONCE
Refills: 0 | Status: COMPLETED | OUTPATIENT
Start: 2021-09-23 | End: 2021-09-23

## 2021-09-23 RX ORDER — DEXTROSE 50 % IN WATER 50 %
25 SYRINGE (ML) INTRAVENOUS ONCE
Refills: 0 | Status: DISCONTINUED | OUTPATIENT
Start: 2021-09-23 | End: 2021-09-24

## 2021-09-23 RX ORDER — CIPROFLOXACIN LACTATE 400MG/40ML
400 VIAL (ML) INTRAVENOUS EVERY 12 HOURS
Refills: 0 | Status: DISCONTINUED | OUTPATIENT
Start: 2021-09-23 | End: 2021-09-24

## 2021-09-23 RX ORDER — ONDANSETRON 8 MG/1
4 TABLET, FILM COATED ORAL ONCE
Refills: 0 | Status: COMPLETED | OUTPATIENT
Start: 2021-09-23 | End: 2021-09-23

## 2021-09-23 RX ORDER — ATORVASTATIN CALCIUM 80 MG/1
20 TABLET, FILM COATED ORAL AT BEDTIME
Refills: 0 | Status: DISCONTINUED | OUTPATIENT
Start: 2021-09-23 | End: 2021-09-24

## 2021-09-23 RX ORDER — TAMSULOSIN HYDROCHLORIDE 0.4 MG/1
0.4 CAPSULE ORAL DAILY
Refills: 0 | Status: DISCONTINUED | OUTPATIENT
Start: 2021-09-23 | End: 2021-09-24

## 2021-09-23 RX ORDER — SODIUM CHLORIDE 9 MG/ML
1000 INJECTION, SOLUTION INTRAVENOUS
Refills: 0 | Status: DISCONTINUED | OUTPATIENT
Start: 2021-09-23 | End: 2021-09-24

## 2021-09-23 RX ORDER — ASPIRIN/CALCIUM CARB/MAGNESIUM 324 MG
81 TABLET ORAL DAILY
Refills: 0 | Status: DISCONTINUED | OUTPATIENT
Start: 2021-09-23 | End: 2021-09-24

## 2021-09-23 RX ORDER — KETOROLAC TROMETHAMINE 30 MG/ML
30 SYRINGE (ML) INJECTION ONCE
Refills: 0 | Status: DISCONTINUED | OUTPATIENT
Start: 2021-09-23 | End: 2021-09-23

## 2021-09-23 RX ORDER — SODIUM CHLORIDE 9 MG/ML
1000 INJECTION INTRAMUSCULAR; INTRAVENOUS; SUBCUTANEOUS ONCE
Refills: 0 | Status: COMPLETED | OUTPATIENT
Start: 2021-09-23 | End: 2021-09-23

## 2021-09-23 RX ORDER — GLUCAGON INJECTION, SOLUTION 0.5 MG/.1ML
1 INJECTION, SOLUTION SUBCUTANEOUS ONCE
Refills: 0 | Status: DISCONTINUED | OUTPATIENT
Start: 2021-09-23 | End: 2021-09-24

## 2021-09-23 RX ORDER — CLOPIDOGREL BISULFATE 75 MG/1
75 TABLET, FILM COATED ORAL DAILY
Refills: 0 | Status: DISCONTINUED | OUTPATIENT
Start: 2021-09-24 | End: 2021-09-24

## 2021-09-23 RX ORDER — DEXTROSE 50 % IN WATER 50 %
12.5 SYRINGE (ML) INTRAVENOUS ONCE
Refills: 0 | Status: DISCONTINUED | OUTPATIENT
Start: 2021-09-23 | End: 2021-09-24

## 2021-09-23 RX ORDER — ONDANSETRON 8 MG/1
4 TABLET, FILM COATED ORAL EVERY 6 HOURS
Refills: 0 | Status: DISCONTINUED | OUTPATIENT
Start: 2021-09-23 | End: 2021-09-24

## 2021-09-23 RX ORDER — INFLUENZA VIRUS VACCINE 15; 15; 15; 15 UG/.5ML; UG/.5ML; UG/.5ML; UG/.5ML
0.5 SUSPENSION INTRAMUSCULAR ONCE
Refills: 0 | Status: DISCONTINUED | OUTPATIENT
Start: 2021-09-23 | End: 2021-09-24

## 2021-09-23 RX ORDER — DEXTROSE 50 % IN WATER 50 %
15 SYRINGE (ML) INTRAVENOUS ONCE
Refills: 0 | Status: DISCONTINUED | OUTPATIENT
Start: 2021-09-23 | End: 2021-09-24

## 2021-09-23 RX ORDER — ATENOLOL 25 MG/1
50 TABLET ORAL DAILY
Refills: 0 | Status: DISCONTINUED | OUTPATIENT
Start: 2021-09-23 | End: 2021-09-24

## 2021-09-23 RX ORDER — DOXAZOSIN MESYLATE 4 MG
8 TABLET ORAL
Refills: 0 | Status: DISCONTINUED | OUTPATIENT
Start: 2021-09-23 | End: 2021-09-24

## 2021-09-23 RX ORDER — ACETAMINOPHEN 500 MG
650 TABLET ORAL EVERY 6 HOURS
Refills: 0 | Status: DISCONTINUED | OUTPATIENT
Start: 2021-09-23 | End: 2021-09-24

## 2021-09-23 RX ORDER — LOSARTAN POTASSIUM 100 MG/1
25 TABLET, FILM COATED ORAL DAILY
Refills: 0 | Status: DISCONTINUED | OUTPATIENT
Start: 2021-09-23 | End: 2021-09-24

## 2021-09-23 RX ORDER — MORPHINE SULFATE 50 MG/1
4 CAPSULE, EXTENDED RELEASE ORAL ONCE
Refills: 0 | Status: DISCONTINUED | OUTPATIENT
Start: 2021-09-23 | End: 2021-09-23

## 2021-09-23 RX ORDER — DIPHENHYDRAMINE HCL 50 MG
25 CAPSULE ORAL ONCE
Refills: 0 | Status: COMPLETED | OUTPATIENT
Start: 2021-09-23 | End: 2021-09-23

## 2021-09-23 RX ADMIN — SODIUM CHLORIDE 1000 MILLILITER(S): 9 INJECTION INTRAMUSCULAR; INTRAVENOUS; SUBCUTANEOUS at 17:07

## 2021-09-23 RX ADMIN — Medication 650 MILLIGRAM(S): at 15:48

## 2021-09-23 RX ADMIN — Medication 650 MILLIGRAM(S): at 17:20

## 2021-09-23 RX ADMIN — ATENOLOL 50 MILLIGRAM(S): 25 TABLET ORAL at 18:31

## 2021-09-23 RX ADMIN — Medication 650 MILLIGRAM(S): at 20:07

## 2021-09-23 RX ADMIN — MORPHINE SULFATE 4 MILLIGRAM(S): 50 CAPSULE, EXTENDED RELEASE ORAL at 17:20

## 2021-09-23 RX ADMIN — MORPHINE SULFATE 4 MILLIGRAM(S): 50 CAPSULE, EXTENDED RELEASE ORAL at 15:14

## 2021-09-23 RX ADMIN — Medication 25 MILLIGRAM(S): at 15:50

## 2021-09-23 RX ADMIN — Medication 650 MILLIGRAM(S): at 18:39

## 2021-09-23 RX ADMIN — TAMSULOSIN HYDROCHLORIDE 0.4 MILLIGRAM(S): 0.4 CAPSULE ORAL at 18:31

## 2021-09-23 RX ADMIN — ATORVASTATIN CALCIUM 20 MILLIGRAM(S): 80 TABLET, FILM COATED ORAL at 21:05

## 2021-09-23 RX ADMIN — LOSARTAN POTASSIUM 25 MILLIGRAM(S): 100 TABLET, FILM COATED ORAL at 18:31

## 2021-09-23 RX ADMIN — ONDANSETRON 4 MILLIGRAM(S): 8 TABLET, FILM COATED ORAL at 14:01

## 2021-09-23 RX ADMIN — MORPHINE SULFATE 4 MILLIGRAM(S): 50 CAPSULE, EXTENDED RELEASE ORAL at 14:00

## 2021-09-23 RX ADMIN — Medication 6: at 21:34

## 2021-09-23 RX ADMIN — Medication 30 MILLIGRAM(S): at 17:35

## 2021-09-23 RX ADMIN — Medication 30 MILLIGRAM(S): at 17:21

## 2021-09-23 RX ADMIN — Medication 81 MILLIGRAM(S): at 18:31

## 2021-09-23 RX ADMIN — SODIUM CHLORIDE 1000 MILLILITER(S): 9 INJECTION INTRAMUSCULAR; INTRAVENOUS; SUBCUTANEOUS at 14:02

## 2021-09-23 NOTE — ED PROVIDER NOTE - OBJECTIVE STATEMENT
Patient states he has abdominal pain since this am. Patient denies any nausea and vomiting. Patient is currently aox3 v/s wnl, lung sounds clear romero, abdomen soft with tenderness to right lower quadrant on palpation, skin warm, dry and intact. Patient is in stable condition awaiting evaluation by MD at present time. Patient states he has abdominal pain since this am. Patient denies any nausea and vomiting. pmhx significant for HTN, HLD, DM, BPH, TIA on Plavix and ASA who presented to ED with right abdominal pain which woke him up. Endorses nausea but no vomiting.

## 2021-09-23 NOTE — H&P ADULT - PROBLEM SELECTOR PLAN 1
-- Bisi Chavez)  EndocrinologyMetabDiabetes  [phone] [place] [address]  Phone: [phone]  Fax: [phone]  Follow Up Time: 1-3 Days

## 2021-09-23 NOTE — H&P ADULT - HISTORY OF PRESENT ILLNESS
Patient is a 61yoM with pmhx significant for HTN, HLD, DM, BPH, TIA on Plavix and ASA who presented to ED with right abdominal pain which woke him up. Endorses nausea but no vomiting. CTAP shows 5x3mm right UPJ stone with moderate right hydronephrosis. Cr 1.3.  WBC 13. Temp 100.1F. Patient has no history of kidney stones, has never seen a urologist.

## 2021-09-23 NOTE — H&P ADULT - NSICDXPASTMEDICALHX_GEN_ALL_CORE_FT
PAST MEDICAL HISTORY:  BPH (benign prostatic hyperplasia)     DM (diabetes mellitus)     History of TIAs     HTN (hypertension)

## 2021-09-23 NOTE — H&P ADULT - NSHPPHYSICALEXAM_GEN_ALL_CORE
GENERAL: NAD, well-groomed, thin  HEAD:  Atraumatic, Normocephalic  EYES: EOMI, PERRLA, conjunctiva and sclera clear  NECK: Supple, No JVD, Normal thyroid  NERVOUS SYSTEM:  Alert & Oriented X3, Good concentration  CHEST/LUNG: Clear to percussion bilaterally  HEART: Regular rate and rhythm  ABDOMEN: Soft, right lower quadrant tenderness, right CVA tenderness  EXTREMITIES:  2+ Peripheral Pulses  LYMPH: No cervical adenopathy  : No suprapubic tenderness, no bladder distention, no gross hematuria.  SKIN: No rashes or lesions

## 2021-09-23 NOTE — ED PROVIDER NOTE - CLINICAL SUMMARY MEDICAL DECISION MAKING FREE TEXT BOX
pt with ureteral stone with WBC elevation, fever, likely infected stone - Urology consulted, aware, will start on Levaquin and admit to Urology service for further care.

## 2021-09-23 NOTE — H&P ADULT - NSHPLABSRESULTS_GEN_ALL_CORE
11.2   13.90 )-----------( 394      ( 23 Sep 2021 13:42 )             35.5       137  |  101  |  11  ----------------------------<  269<H>  3.9   |  28  |  1.32<H>    Ca    9.3      23 Sep 2021 13:42    TPro  8.6<H>  /  Alb  3.5  /  TBili  0.5  /  DBili  x   /  AST  12<L>  /  ALT  21  /  AlkPhos  91    Urinalysis Basic - ( 23 Sep 2021 14:23 )    Color: Yellow / Appearance: Clear / S.010 / pH: x  Gluc: x / Ketone: Trace  / Bili: Negative / Urobili: Negative mg/dL   Blood: x / Protein: Negative mg/dL / Nitrite: Negative   Leuk Esterase: Negative / RBC: x / WBC x   Sq Epi: x / Non Sq Epi: x / Bacteria: x

## 2021-09-23 NOTE — PATIENT PROFILE ADULT - NSPROHMSYMPCOND_GEN_A_NUR
diabetes Simponi Counseling:  I discussed with the patient the risks of golimumab including but not limited to myelosuppression, immunosuppression, autoimmune hepatitis, demyelinating diseases, lymphoma, and serious infections.  The patient understands that monitoring is required including a PPD at baseline and must alert us or the primary physician if symptoms of infection or other concerning signs are noted.

## 2021-09-23 NOTE — CONSULT NOTE ADULT - TIME BILLING
Lab test review, Radiology Review, Vitals review, discussion with urology and ER, Physical examination, Assessment and plan; Plan discussion with patient

## 2021-09-24 ENCOUNTER — TRANSCRIPTION ENCOUNTER (OUTPATIENT)
Age: 61
End: 2021-09-24

## 2021-09-24 VITALS
DIASTOLIC BLOOD PRESSURE: 74 MMHG | RESPIRATION RATE: 18 BRPM | HEART RATE: 84 BPM | SYSTOLIC BLOOD PRESSURE: 137 MMHG | OXYGEN SATURATION: 99 % | TEMPERATURE: 99 F

## 2021-09-24 LAB
-  STAPHYLOCOCCUS EPIDERMIDIS: SIGNIFICANT CHANGE UP
A1C WITH ESTIMATED AVERAGE GLUCOSE RESULT: 10.1 % — HIGH (ref 4–5.6)
ANION GAP SERPL CALC-SCNC: 8 MMOL/L — SIGNIFICANT CHANGE UP (ref 5–17)
BASOPHILS # BLD AUTO: 0.02 K/UL — SIGNIFICANT CHANGE UP (ref 0–0.2)
BASOPHILS NFR BLD AUTO: 0.1 % — SIGNIFICANT CHANGE UP (ref 0–2)
BUN SERPL-MCNC: 15 MG/DL — SIGNIFICANT CHANGE UP (ref 7–23)
CALCIUM SERPL-MCNC: 8.7 MG/DL — SIGNIFICANT CHANGE UP (ref 8.5–10.1)
CHLORIDE SERPL-SCNC: 103 MMOL/L — SIGNIFICANT CHANGE UP (ref 96–108)
CO2 SERPL-SCNC: 26 MMOL/L — SIGNIFICANT CHANGE UP (ref 22–31)
COVID-19 SPIKE DOMAIN AB INTERP: POSITIVE
COVID-19 SPIKE DOMAIN ANTIBODY RESULT: >250 U/ML — HIGH
CREAT SERPL-MCNC: 1.49 MG/DL — HIGH (ref 0.5–1.3)
EOSINOPHIL # BLD AUTO: 0.01 K/UL — SIGNIFICANT CHANGE UP (ref 0–0.5)
EOSINOPHIL NFR BLD AUTO: 0.1 % — SIGNIFICANT CHANGE UP (ref 0–6)
ESTIMATED AVERAGE GLUCOSE: 243 MG/DL — HIGH (ref 68–114)
GLUCOSE SERPL-MCNC: 162 MG/DL — HIGH (ref 70–99)
GRAM STN FLD: SIGNIFICANT CHANGE UP
GRAM STN FLD: SIGNIFICANT CHANGE UP
HCT VFR BLD CALC: 31.4 % — LOW (ref 39–50)
HGB BLD-MCNC: 10 G/DL — LOW (ref 13–17)
IMM GRANULOCYTES NFR BLD AUTO: 0.6 % — SIGNIFICANT CHANGE UP (ref 0–1.5)
LYMPHOCYTES # BLD AUTO: 0.82 K/UL — LOW (ref 1–3.3)
LYMPHOCYTES # BLD AUTO: 5.7 % — LOW (ref 13–44)
MCHC RBC-ENTMCNC: 26.5 PG — LOW (ref 27–34)
MCHC RBC-ENTMCNC: 31.8 GM/DL — LOW (ref 32–36)
MCV RBC AUTO: 83.1 FL — SIGNIFICANT CHANGE UP (ref 80–100)
METHOD TYPE: SIGNIFICANT CHANGE UP
MONOCYTES # BLD AUTO: 0.76 K/UL — SIGNIFICANT CHANGE UP (ref 0–0.9)
MONOCYTES NFR BLD AUTO: 5.2 % — SIGNIFICANT CHANGE UP (ref 2–14)
NEUTROPHILS # BLD AUTO: 12.8 K/UL — HIGH (ref 1.8–7.4)
NEUTROPHILS NFR BLD AUTO: 88.3 % — HIGH (ref 43–77)
NRBC # BLD: 0 /100 WBCS — SIGNIFICANT CHANGE UP (ref 0–0)
PLATELET # BLD AUTO: 318 K/UL — SIGNIFICANT CHANGE UP (ref 150–400)
POTASSIUM SERPL-MCNC: 3.6 MMOL/L — SIGNIFICANT CHANGE UP (ref 3.5–5.3)
POTASSIUM SERPL-SCNC: 3.6 MMOL/L — SIGNIFICANT CHANGE UP (ref 3.5–5.3)
RBC # BLD: 3.78 M/UL — LOW (ref 4.2–5.8)
RBC # FLD: 14.4 % — SIGNIFICANT CHANGE UP (ref 10.3–14.5)
SARS-COV-2 IGG+IGM SERPL QL IA: >250 U/ML — HIGH
SARS-COV-2 IGG+IGM SERPL QL IA: POSITIVE
SODIUM SERPL-SCNC: 137 MMOL/L — SIGNIFICANT CHANGE UP (ref 135–145)
SPECIMEN SOURCE: SIGNIFICANT CHANGE UP
SPECIMEN SOURCE: SIGNIFICANT CHANGE UP
WBC # BLD: 14.5 K/UL — HIGH (ref 3.8–10.5)
WBC # FLD AUTO: 14.5 K/UL — HIGH (ref 3.8–10.5)

## 2021-09-24 PROCEDURE — 74018 RADEX ABDOMEN 1 VIEW: CPT | Mod: 26

## 2021-09-24 PROCEDURE — 99233 SBSQ HOSP IP/OBS HIGH 50: CPT

## 2021-09-24 RX ORDER — CIPROFLOXACIN LACTATE 400MG/40ML
1 VIAL (ML) INTRAVENOUS
Qty: 14 | Refills: 0
Start: 2021-09-24 | End: 2021-09-30

## 2021-09-24 RX ORDER — OXYCODONE HYDROCHLORIDE 5 MG/1
10 TABLET ORAL ONCE
Refills: 0 | Status: DISCONTINUED | OUTPATIENT
Start: 2021-09-24 | End: 2021-09-24

## 2021-09-24 RX ORDER — SODIUM CHLORIDE 9 MG/ML
1000 INJECTION, SOLUTION INTRAVENOUS
Refills: 0 | Status: DISCONTINUED | OUTPATIENT
Start: 2021-09-24 | End: 2021-09-24

## 2021-09-24 RX ORDER — MORPHINE SULFATE 50 MG/1
2 CAPSULE, EXTENDED RELEASE ORAL ONCE
Refills: 0 | Status: DISCONTINUED | OUTPATIENT
Start: 2021-09-24 | End: 2021-09-24

## 2021-09-24 RX ADMIN — Medication 2: at 08:07

## 2021-09-24 RX ADMIN — Medication 650 MILLIGRAM(S): at 05:00

## 2021-09-24 RX ADMIN — OXYCODONE HYDROCHLORIDE 10 MILLIGRAM(S): 5 TABLET ORAL at 07:57

## 2021-09-24 RX ADMIN — ATENOLOL 50 MILLIGRAM(S): 25 TABLET ORAL at 05:15

## 2021-09-24 RX ADMIN — CLOPIDOGREL BISULFATE 75 MILLIGRAM(S): 75 TABLET, FILM COATED ORAL at 12:23

## 2021-09-24 RX ADMIN — Medication 81 MILLIGRAM(S): at 12:23

## 2021-09-24 RX ADMIN — Medication 2: at 12:23

## 2021-09-24 RX ADMIN — TAMSULOSIN HYDROCHLORIDE 0.4 MILLIGRAM(S): 0.4 CAPSULE ORAL at 12:23

## 2021-09-24 RX ADMIN — Medication 650 MILLIGRAM(S): at 03:49

## 2021-09-24 RX ADMIN — Medication 8 MILLIGRAM(S): at 05:15

## 2021-09-24 RX ADMIN — Medication 200 MILLIGRAM(S): at 05:15

## 2021-09-24 RX ADMIN — Medication 4: at 16:14

## 2021-09-24 RX ADMIN — OXYCODONE HYDROCHLORIDE 10 MILLIGRAM(S): 5 TABLET ORAL at 06:39

## 2021-09-24 RX ADMIN — LOSARTAN POTASSIUM 25 MILLIGRAM(S): 100 TABLET, FILM COATED ORAL at 05:15

## 2021-09-24 NOTE — DISCHARGE NOTE PROVIDER - HOSPITAL COURSE
Patient is a 61yoM with pmhx significant for HTN, HLD, DM, BPH, TIA on Plavix and ASA who presented to ED with right abdominal pain which woke him up. Endorses nausea but no vomiting. CTAP shows 5x3mm right UPJ stone with moderate right hydronephrosis. Cr 1.3.  WBC 13. Temp 100.1F. Patient has no history of kidney stones, has never seen a urologist.       While in the ER, urology met with patient to discuss surgery and admit patient for surgery same night. Patient stated that he did not want surgery same night because he had not eaten all day and that he would prefer to have surgery the next day. It was explained to patient that his condition could potentially worsen overnight if surgery was not done until the next day. It was also discussed that patient would not be able to eat after midnight and would not be able to eat until after procedure, which was scheduled as an add-on procedure that could potentially start later in the day. He stated he understood and surgery was scheduled for the next day. Patient started on IV fluids. Discussed with patient in the morning that we could do xray imaging to potentially see if stone moved but that we would continue NPO order in preparation for surgery as patient's WBC increased and Cr increased. Patient agreed. Nurse informed urology PA that she came into the patient's room to find patient eating lunch. After speaking with patient he stated that he does not want surgery and that's why he was eating lunch. Discussed with patient that if he does not want surgery then we will discharge home where patient can try to pass stone on his own. Explained reasons that patient should call provider's office such as intractable right flank pain, fever, nausea, vomiting. Patient states that he understands and was deemed stable for dc with antibiotics.

## 2021-09-24 NOTE — CONSULT NOTE ADULT - PROBLEM SELECTOR RECOMMENDATION 2
s/p IVF Bolus in ER   cont to monitor renal function and avoid nephrotoxic medications. s/p IVF Bolus in ER   worsening renal function   cont to monitor renal function and avoid nephrotoxic medications.

## 2021-09-24 NOTE — CONSULT NOTE ADULT - ASSESSMENT
The patient has a Revised Cardiac Risk index of 6.0% for major cardiac event.     The patient is currently medically optimized for urological procedure. 
The patient has a Revised Cardiac Risk index of 6.0% for major cardiac event.     The patient is currently medically optimized for urological procedure.

## 2021-09-24 NOTE — CONSULT NOTE ADULT - PROBLEM SELECTOR RECOMMENDATION 4
HgA1c: 9.0% (july)  would hold home oral hypoglycemics   continue w/ FS monitoring w/ insulin s/s coverage   continue with Aspirin and Statin if cleared by Urology
HgA1c: 9.0% (july)  would hold home oral hypoglycemics   continue w/ FS monitoring w/ insulin s/s coverage   continue with Aspirin, plavix and Statin

## 2021-09-24 NOTE — DISCHARGE NOTE PROVIDER - NSDCCPCAREPLAN_GEN_ALL_CORE_FT
PRINCIPAL DISCHARGE DIAGNOSIS  Diagnosis: Kidney stone  Assessment and Plan of Treatment:       SECONDARY DISCHARGE DIAGNOSES  Diagnosis: Urinary tract infection, acute  Assessment and Plan of Treatment:

## 2021-09-24 NOTE — DISCHARGE NOTE PROVIDER - NSDCMRMEDTOKEN_GEN_ALL_CORE_FT
Alogliptin 25 mg oral tablet: 1 tab(s) orally once a day  aspirin 81 mg oral delayed release tablet: 1 tab(s) orally once a day  atenolol 50 mg oral tablet: 1 tab(s) orally once a day  atorvastatin 20 mg oral tablet: 1 tab(s) orally once a day  ciprofloxacin 500 mg oral tablet: 1 tab(s) orally 2 times a day   clopidogrel 75 mg oral tablet: 1 tab(s) orally once a day  doxazosin 8 mg oral tablet: 1 tab(s) orally 2 times a day  glipiZIDE 10 mg oral tablet, extended release: 1 tab(s) orally once a day  losartan-hydrochlorothiazide 100 mg-25 mg oral tablet: 1 tab(s) orally once a day  metFORMIN 1000 mg oral tablet: 1 tab(s) orally 2 times a day  tamsulosin 0.4 mg oral capsule: 1 cap(s) orally once a day

## 2021-09-24 NOTE — DISCHARGE NOTE NURSING/CASE MANAGEMENT/SOCIAL WORK - PATIENT PORTAL LINK FT
You can access the FollowMyHealth Patient Portal offered by [facility] by registering at the following website: [email or web address]. By joining [facility]’s FollowMyHealth portal, you will also be able to view your health information using other applications (apps) compatible with our system.

## 2021-09-24 NOTE — DISCHARGE NOTE PROVIDER - CARE PROVIDER_API CALL
Carmine Herrera)  Urology  [address] [place] [address]  Phone: [phone]  Fax: [phone]  Follow Up Time: Routine

## 2021-09-24 NOTE — CONSULT NOTE ADULT - PROBLEM SELECTOR RECOMMENDATION 3
recommend continuing Atenolol  would hold Losartan/HCTZ to avoid renal failure   consider Hydralazine if blood pressure becomes elevated
recommend continuing Atenolol  would hold Losartan/HCTZ to avoid renal failure   consider Hydralazine if blood pressure becomes elevated

## 2021-09-24 NOTE — CONSULT NOTE ADULT - PROBLEM SELECTOR RECOMMENDATION 9
+low grade fever, +Leukocytosis   CT abd/pelvis: Moderate right hydronephrosis secondary to a 5 x 3 mm UPJ stone. Markedly enlarged prostate with thickened bladder and numerous layering bladder stones, compatible with chronic bladder outlet obstruction.  cont w/ IV abx   Mgmt as per Urology
+low grade fever, +Leukocytosis   CT abd/pelvis: Moderate right hydronephrosis secondary to a 5 x 3 mm UPJ stone. Markedly enlarged prostate with thickened bladder and numerous layering bladder stones, compatible with chronic bladder outlet obstruction.  IVF and IV Levofloxacin given in ER   Mgmt as per Urology

## 2021-09-24 NOTE — DISCHARGE NOTE NURSING/CASE MANAGEMENT/SOCIAL WORK - NSDCPEPTSTROKESIGNS_GEN_ALL_CORE
Zandra REYES  HASMUKH Sellers  Acute Care Surgery  [phone]  [phone]                 Sudden numbness or weakness of the face, arm, or leg, especially on one side of the body. Confusion, trouble speaking or understanding. Trouble seeing in one or both eyes. Trouble walking, dizziness, loss of balance or coordination. Severe headache.

## 2021-09-24 NOTE — CONSULT NOTE ADULT - SUBJECTIVE AND OBJECTIVE BOX
Patient is a 61y old  Male who presents with a chief complaint of     INTERVAL HPI/ OVERNIGHT EVENTS: Pt was seen and examined at bedside today, Presented to ER with c/o Dysuria, Right flank pain with radiation to right lower abdomen, and chills. Pt admits that he still has flank pain but admits to some improvement since ER arrival, pt denies any CP, SOB, palpitations, LE edema or Decrease ET      MEDICATIONS  (STANDING):    MEDICATIONS  (PRN):      Allergies    penicillin (Chills)    Intolerances        REVIEW OF SYSTEMS:    Unable to examine due to [ ] Encephalopathy [ ] Advanced Dementia [ ] Expressive Aphasia [ ] Non-verbal patient    CONSTITUTIONAL: positive fever, positive generalized weakness/Fatigue/decrease appetite, No weight loss  EYES: No eye pain, visual disturbances, or discharge  ENMT:  No difficulty hearing, tinnitus, vertigo; No sinus or throat pain  NECK: No pain or stiffness  RESPIRATORY: No shortness of breath,  cough, wheezing, sputum or hemoptysis   CARDIOVASCULAR: No chest pain, palpitations, or leg swelling  GASTROINTESTINAL: No abdominal pain. No nausea, vomiting, diarrhea or constipation. No melena or hematochezia.  GENITOURINARY: positive dysuria, and frequency  NEUROLOGICAL: No headaches, Dizziness, memory loss, loss of strength, numbness, or tremors  SKIN: No itching, burning, rashes, or lesions   MUSCULOSKELETAL: right flank pain radiating to RLQ abdomen   PSYCHIATRIC: No depression, anxiety, mood swings, or difficulty sleeping  HEME/LYMPH: No easy bruising, or bleeding gums      Vital Signs Last 24 Hrs  T(C): 37.8 (23 Sep 2021 15:48), Max: 37.8 (23 Sep 2021 15:48)  T(F): 100.1 (23 Sep 2021 15:48), Max: 100.1 (23 Sep 2021 15:48)  HR: 82 (23 Sep 2021 12:53) (82 - 82)  BP: 148/73 (23 Sep 2021 12:53) (148/73 - 148/73)  BP(mean): --  RR: 20 (23 Sep 2021 12:53) (20 - 20)  SpO2: 97% (23 Sep 2021 12:53) (97% - 97%)    PHYSICAL EXAM:  GENERAL: NAD, Obese   HEAD:  Atraumatic, Normocephalic  EYES: conjunctiva and sclera clear  ENMT: Moist mucous membranes  NECK: Supple, No JVD, Normal thyroid  CHEST/LUNG: Clear to Auscultation bilaterally; No rales, rhonchi, wheezing, or rubs  HEART: Regular rate and rhythm; No murmurs, rubs, or gallops  ABDOMEN: Soft, Nontender, Nondistended; Bowel sounds present  EXTREMITIES:  2+ Peripheral Pulses, No clubbing, cyanosis, or edema  SKIN: No rashes or lesions  NERVOUS SYSTEM:  Alert & Oriented X3, Good concentration; Motor Strength 5/5 B/L upper and lower extremities    LABS:                        11.2   13.90 )-----------( 394      ( 23 Sep 2021 13:42 )             35.5         137  |  101  |  11  ----------------------------<  269<H>  3.9   |  28  |  1.32<H>    Ca    9.3      23 Sep 2021 13:42    TPro  8.6<H>  /  Alb  3.5  /  TBili  0.5  /  DBili  x   /  AST  12<L>  /  ALT  21  /  AlkPhos  91        Urinalysis Basic - ( 23 Sep 2021 14:23 )    Color: Yellow / Appearance: Clear / S.010 / pH: x  Gluc: x / Ketone: Trace  / Bili: Negative / Urobili: Negative mg/dL   Blood: x / Protein: Negative mg/dL / Nitrite: Negative   Leuk Esterase: Negative / RBC: x / WBC x   Sq Epi: x / Non Sq Epi: x / Bacteria: x      CAPILLARY BLOOD GLUCOSE              RADIOLOGY & ADDITIONAL TESTS:    < from: CT Abdomen and Pelvis w/ IV Cont (21 @ 16:19) >  IMPRESSION: Moderate right hydronephrosis secondary to a 5 x 3 mm UPJ stone. Markedly enlarged prostate with thickened bladder and numerous layering bladder stones, compatible with chronic bladder outlet obstruction.    < end of copied text >        Imaging Personally Reviewed:  [ ] YES  [ ] NO    Consultant(s) Notes Reviewed:  [ ] YES  [ ] NO    Care Discussed with Consultants/Other Providers [x ] YES  [ ] NO
Patient is a 61y old  Male who presents with a chief complaint of Obstructing right renal calculi (23 Sep 2021 17:55)      INTERVAL HPI/ OVERNIGHT EVENTS: Pt was seen and examined at bedside today, Low grade fever overnight, pt admits to improvement in right flank pain     MEDICATIONS  (STANDING):  aspirin enteric coated 81 milliGRAM(s) Oral daily  ATENolol  Tablet 50 milliGRAM(s) Oral daily  atorvastatin 20 milliGRAM(s) Oral at bedtime  ciprofloxacin   IVPB 400 milliGRAM(s) IV Intermittent every 12 hours  clopidogrel Tablet 75 milliGRAM(s) Oral daily  dextrose 40% Gel 15 Gram(s) Oral once  dextrose 5%. 1000 milliLiter(s) (50 mL/Hr) IV Continuous <Continuous>  dextrose 5%. 1000 milliLiter(s) (100 mL/Hr) IV Continuous <Continuous>  dextrose 50% Injectable 25 Gram(s) IV Push once  dextrose 50% Injectable 12.5 Gram(s) IV Push once  dextrose 50% Injectable 25 Gram(s) IV Push once  doxazosin Oral Tab/Cap - Peds 8 milliGRAM(s) Oral two times a day  glucagon  Injectable 1 milliGRAM(s) IntraMuscular once  influenza   Vaccine 0.5 milliLiter(s) IntraMuscular once  insulin lispro (ADMELOG) corrective regimen sliding scale   SubCutaneous three times a day before meals  losartan 25 milliGRAM(s) Oral daily  tamsulosin 0.4 milliGRAM(s) Oral daily    MEDICATIONS  (PRN):  acetaminophen   Tablet .. 650 milliGRAM(s) Oral every 6 hours PRN Temp greater or equal to 38C (100.4F), Mild Pain (1 - 3)  aluminum hydroxide/magnesium hydroxide/simethicone Suspension 30 milliLiter(s) Oral every 4 hours PRN Dyspepsia  bisacodyl 10 milliGRAM(s) Oral daily PRN Constipation  ondansetron Injectable 4 milliGRAM(s) IV Push every 6 hours PRN Nausea and/or Vomiting      Allergies    penicillin (Chills)    Intolerances        REVIEW OF SYSTEMS:    Unable to examine due to [ ] Encephalopathy [ ] Advanced Dementia [ ] Expressive Aphasia [ ] Non-verbal patient    CONSTITUTIONAL: NO fever, No generalized weakness/Fatigue/decrease appetite, No weight loss  EYES: No eye pain, visual disturbances, or discharge  ENMT:  No difficulty hearing, tinnitus, vertigo; No sinus or throat pain  NECK: No pain or stiffness  RESPIRATORY: No shortness of breath,  cough, wheezing, sputum or hemoptysis   CARDIOVASCULAR: No chest pain, palpitations, or leg swelling  GASTROINTESTINAL: No abdominal pain. No nausea, vomiting, diarrhea or constipation. No melena or hematochezia.  GENITOURINARY: positive dysuria  NEUROLOGICAL: No headaches, Dizziness, memory loss, loss of strength, numbness, or tremors  SKIN: No itching, burning, rashes, or lesions   MUSCULOSKELETAL: right flank pain radiating to RLQ abdomen   PSYCHIATRIC: No depression, anxiety, mood swings, or difficulty sleeping  HEME/LYMPH: No easy bruising, or bleeding gums      Vital Signs Last 24 Hrs  T(C): 37.8 (24 Sep 2021 05:01), Max: 38.2 (23 Sep 2021 18:14)  T(F): 100.1 (24 Sep 2021 05:01), Max: 100.7 (23 Sep 2021 18:14)  HR: 87 (24 Sep 2021 05:) (81 - 103)  BP: 151/74 (24 Sep 2021 05:01) (126/72 - 172/85)  BP(mean): --  RR: 18 (24 Sep 2021 05:01) (16 - 20)  SpO2: 98% (24 Sep 2021 05:01) (97% - 99%)    PHYSICAL EXAM:  HEAD:  Atraumatic, Normocephalic  EYES: conjunctiva and sclera clear  ENMT: Moist mucous membranes  NECK: Supple, No JVD, Normal thyroid  CHEST/LUNG: Clear to Auscultation bilaterally; No rales, rhonchi, wheezing, or rubs  HEART: Regular rate and rhythm; No murmurs, rubs, or gallops  ABDOMEN: Soft, Nontender, Nondistended; Bowel sounds present  EXTREMITIES:  2+ Peripheral Pulses, No clubbing, cyanosis, or edema  SKIN: No rashes or lesions  NERVOUS SYSTEM:  Alert & Oriented X3, Good concentration; Motor Strength 5/5 B/L upper and lower extremities      LABS:                        10.0   14.50 )-----------( 318      ( 24 Sep 2021 06:57 )             31.4     09-24    137  |  103  |  15  ----------------------------<  162<H>  3.6   |  26  |  1.49<H>    Ca    8.7      24 Sep 2021 06:47    TPro  8.6<H>  /  Alb  3.5  /  TBili  0.5  /  DBili  x   /  AST  12<L>  /  ALT  21  /  AlkPhos  91  09-      Urinalysis Basic - ( 23 Sep 2021 14:23 )    Color: Yellow / Appearance: Clear / S.010 / pH: x  Gluc: x / Ketone: Trace  / Bili: Negative / Urobili: Negative mg/dL   Blood: x / Protein: Negative mg/dL / Nitrite: Negative   Leuk Esterase: Negative / RBC: x / WBC x   Sq Epi: x / Non Sq Epi: x / Bacteria: x      CAPILLARY BLOOD GLUCOSE      POCT Blood Glucose.: 169 mg/dL (24 Sep 2021 08:00)  POCT Blood Glucose.: 259 mg/dL (23 Sep 2021 22:15)  POCT Blood Glucose.: 298 mg/dL (23 Sep 2021 21:07)          RADIOLOGY & ADDITIONAL TESTS:          Imaging Personally Reviewed:  [ ] YES  [ ] NO    Consultant(s) Notes Reviewed:  [ ] YES  [ ] NO    Care Discussed with Consultants/Other Providers [x ] YES  [ ] NO

## 2021-09-25 ENCOUNTER — EMERGENCY (EMERGENCY)
Facility: HOSPITAL | Age: 61
LOS: 0 days | Discharge: HOME | End: 2021-09-25
Attending: EMERGENCY MEDICINE
Payer: MEDICAID

## 2021-09-25 VITALS
OXYGEN SATURATION: 100 % | SYSTOLIC BLOOD PRESSURE: 197 MMHG | TEMPERATURE: 98 F | DIASTOLIC BLOOD PRESSURE: 84 MMHG | HEIGHT: 69 IN | HEART RATE: 96 BPM | WEIGHT: 220.02 LBS | RESPIRATION RATE: 18 BRPM

## 2021-09-25 DIAGNOSIS — R79.9 ABNORMAL FINDING OF BLOOD CHEMISTRY, UNSPECIFIED: ICD-10-CM

## 2021-09-25 DIAGNOSIS — Z87.438 PERSONAL HISTORY OF OTHER DISEASES OF MALE GENITAL ORGANS: ICD-10-CM

## 2021-09-25 DIAGNOSIS — B95.7 OTHER STAPHYLOCOCCUS AS THE CAUSE OF DISEASES CLASSIFIED ELSEWHERE: ICD-10-CM

## 2021-09-25 DIAGNOSIS — Z79.02 LONG TERM (CURRENT) USE OF ANTITHROMBOTICS/ANTIPLATELETS: ICD-10-CM

## 2021-09-25 DIAGNOSIS — Z88.0 ALLERGY STATUS TO PENICILLIN: ICD-10-CM

## 2021-09-25 DIAGNOSIS — N20.0 CALCULUS OF KIDNEY: ICD-10-CM

## 2021-09-25 DIAGNOSIS — E11.9 TYPE 2 DIABETES MELLITUS WITHOUT COMPLICATIONS: ICD-10-CM

## 2021-09-25 DIAGNOSIS — Z79.82 LONG TERM (CURRENT) USE OF ASPIRIN: ICD-10-CM

## 2021-09-25 DIAGNOSIS — I10 ESSENTIAL (PRIMARY) HYPERTENSION: ICD-10-CM

## 2021-09-25 DIAGNOSIS — Z86.73 PERSONAL HISTORY OF TRANSIENT ISCHEMIC ATTACK (TIA), AND CEREBRAL INFARCTION WITHOUT RESIDUAL DEFICITS: ICD-10-CM

## 2021-09-25 DIAGNOSIS — Z79.84 LONG TERM (CURRENT) USE OF ORAL HYPOGLYCEMIC DRUGS: ICD-10-CM

## 2021-09-25 PROBLEM — N40.0 BENIGN PROSTATIC HYPERPLASIA WITHOUT LOWER URINARY TRACT SYMPTOMS: Chronic | Status: ACTIVE | Noted: 2021-09-23

## 2021-09-25 PROCEDURE — 71045 X-RAY EXAM CHEST 1 VIEW: CPT | Mod: 26

## 2021-09-25 PROCEDURE — 99285 EMERGENCY DEPT VISIT HI MDM: CPT

## 2021-09-25 RX ORDER — LOSARTAN POTASSIUM 100 MG/1
100 TABLET, FILM COATED ORAL ONCE
Refills: 0 | Status: COMPLETED | OUTPATIENT
Start: 2021-09-25 | End: 2021-09-25

## 2021-09-25 RX ORDER — ATENOLOL 25 MG/1
50 TABLET ORAL ONCE
Refills: 0 | Status: COMPLETED | OUTPATIENT
Start: 2021-09-25 | End: 2021-09-25

## 2021-09-25 RX ADMIN — LOSARTAN POTASSIUM 100 MILLIGRAM(S): 100 TABLET, FILM COATED ORAL at 11:31

## 2021-09-25 RX ADMIN — ATENOLOL 50 MILLIGRAM(S): 25 TABLET ORAL at 11:31

## 2021-09-25 NOTE — ED PROVIDER NOTE - PROGRESS NOTE DETAILS
according to [facility] medical record pt had blood cultures done on 9/24/21 and showed both anerobic and aerobic gram + in cluster and detect reported Staph Epidermidis. Dr. Shi infectious disease is notified and sts the blood cultures are contaminated. Pt has no hx of prosthesis or pace maker. Dr. Dillon sts pt does not need another blood cultures. pt is explained and advised to follow up with his pmd and urologist Dr. Herrera and return if symptoms persist or worsen or fever/chills. Dr. Valerio pt's urologist is notified about pt's blood culture and condition and he sts pt does not need to be readmitted, he will follow up with pt at his office.

## 2021-09-25 NOTE — ED PROVIDER NOTE - CLINICAL SUMMARY MEDICAL DECISION MAKING FREE TEXT BOX
hx, exam, [facility] medical record and Dr. Dillon infectious disease.

## 2021-09-25 NOTE — CHART NOTE - NSCHARTNOTEFT_GEN_A_CORE
Lab called and reported positive blood culture for this patient. Lab tried to reach Sofia but no success and then got hold of me.   I Gleaned through the patient's chart and updated Dr Link about positive blood cultures.   Patient has been having fever and left against medical advice.   I called Robinson and updated him about his blood cultures and that if it is true blood infection, it can be life threatening (sepsis, septic shock). He verbalized understanding of risks associated with seeking no medical attention urgently.   Robinson told me that he is planning to go an Emergency room near to his house. I told him as long as he seeks medical attention (does not matte where), that is medically right for him to do.   He can always get medical records from our facility.

## 2021-09-25 NOTE — ED ADULT TRIAGE NOTE - CHIEF COMPLAINT QUOTE
Patient seen on 9/23 signed AMA yesterday, called for positive blood cultures by MD, BP elevated patient did not take morning meds

## 2021-09-25 NOTE — ED PROVIDER NOTE - PATIENT PORTAL LINK FT
You can access the FollowMyHealth Patient Portal offered by [facility] by registering at the following website: [email or web address]. By joining [employer]’s FollowMyHealth portal, you will also be able to view your health information using other applications (apps) compatible with our system.

## 2021-09-25 NOTE — ED ADULT TRIAGE NOTE - BMI (KG/M2)
Health Maintenance Due   Topic Date Due   • COVID-19 Vaccine (1) Never done   • Hepatitis B Vaccine (1 of 3 - Risk 3-dose series) Never done   • DTaP/Tdap/Td Vaccine (1 - Tdap) Never done   • Shingles Vaccine (1 of 2) Never done   • Colorectal Cancer Screen-  Never done   • Hepatitis C Screening  Never done   • Abdominal Aortic Aneurysm (AAA) Screening  Never done   • Pneumococcal Vaccine 65+ (1 of 1 - PPSV23) Never done       Patient is due for topics as listed above but is not proceeding with Immunization(s) COVID-19, Dtap/Tdap/Td, Hep B, Pneumococcal and Shingles, Abdominal Aortic Aneurysm (AAA) screening, Colorectal Cancer Screening: Colonoscopy and Hepatitis C Screening at this time.              32.5

## 2021-09-25 NOTE — ED PROVIDER NOTE - OBJECTIVE STATEMENT
61 years old male walked in sts he was called to return for + blood culture done on 9/24/21 showed gram + in cluster. Pt was admitted here for right renal stones but signed out AMA yesterday. Pt [facility] he has been passing out the fragments in his urine between yesterday and today. Pt denies recent hx of trauma, headache, dizziness, blurred visions, light sensitivities, focal/distal weakness or numbness, neck/back pain/calfs pain, cough, sob, chest pain, nausea, vomiting, fever, chills, abd pain, dysuria or irregular bowel movements.

## 2021-09-25 NOTE — ED PROVIDER NOTE - CONSTITUTIONAL, MLM
Well appearing, awake, alert, oriented to person, place, time/situation and in no apparent distress. Speaking in clear full sentences no nasal flaring no shoulders retractions no diaphoresis, smiling appears very comfortable in a bright light room normal...

## 2021-09-26 LAB
CULTURE RESULTS: SIGNIFICANT CHANGE UP
CULTURE RESULTS: SIGNIFICANT CHANGE UP
GRAM STN FLD: SIGNIFICANT CHANGE UP
ORGANISM # SPEC MICROSCOPIC CNT: SIGNIFICANT CHANGE UP
ORGANISM # SPEC MICROSCOPIC CNT: SIGNIFICANT CHANGE UP
SPECIMEN SOURCE: SIGNIFICANT CHANGE UP
SPECIMEN SOURCE: SIGNIFICANT CHANGE UP

## 2021-09-27 LAB
CULTURE RESULTS: SIGNIFICANT CHANGE UP
SPECIMEN SOURCE: SIGNIFICANT CHANGE UP

## 2021-09-28 LAB
-  AMPICILLIN/SULBACTAM: SIGNIFICANT CHANGE UP
-  CEFAZOLIN: SIGNIFICANT CHANGE UP
-  CLINDAMYCIN: SIGNIFICANT CHANGE UP
-  ERYTHROMYCIN: SIGNIFICANT CHANGE UP
-  GENTAMICIN: SIGNIFICANT CHANGE UP
-  OXACILLIN: SIGNIFICANT CHANGE UP
-  PENICILLIN: SIGNIFICANT CHANGE UP
-  RIFAMPIN: SIGNIFICANT CHANGE UP
-  TETRACYCLINE: SIGNIFICANT CHANGE UP
-  TRIMETHOPRIM/SULFAMETHOXAZOLE: SIGNIFICANT CHANGE UP
-  VANCOMYCIN: SIGNIFICANT CHANGE UP
CULTURE RESULTS: SIGNIFICANT CHANGE UP
GRAM STN FLD: SIGNIFICANT CHANGE UP
METHOD TYPE: SIGNIFICANT CHANGE UP
ORGANISM # SPEC MICROSCOPIC CNT: SIGNIFICANT CHANGE UP
ORGANISM # SPEC MICROSCOPIC CNT: SIGNIFICANT CHANGE UP
SPECIMEN SOURCE: SIGNIFICANT CHANGE UP

## 2021-09-29 DIAGNOSIS — Z88.0 ALLERGY STATUS TO PENICILLIN: ICD-10-CM

## 2021-09-29 DIAGNOSIS — Z86.73 PERSONAL HISTORY OF TRANSIENT ISCHEMIC ATTACK (TIA), AND CEREBRAL INFARCTION WITHOUT RESIDUAL DEFICITS: ICD-10-CM

## 2021-09-29 DIAGNOSIS — I10 ESSENTIAL (PRIMARY) HYPERTENSION: ICD-10-CM

## 2021-09-29 DIAGNOSIS — N32.0 BLADDER-NECK OBSTRUCTION: ICD-10-CM

## 2021-09-29 DIAGNOSIS — N17.9 ACUTE KIDNEY FAILURE, UNSPECIFIED: ICD-10-CM

## 2021-09-29 DIAGNOSIS — N20.0 CALCULUS OF KIDNEY: ICD-10-CM

## 2021-09-29 DIAGNOSIS — N40.0 BENIGN PROSTATIC HYPERPLASIA WITHOUT LOWER URINARY TRACT SYMPTOMS: ICD-10-CM

## 2021-09-29 DIAGNOSIS — E78.5 HYPERLIPIDEMIA, UNSPECIFIED: ICD-10-CM

## 2021-09-29 DIAGNOSIS — E11.9 TYPE 2 DIABETES MELLITUS WITHOUT COMPLICATIONS: ICD-10-CM

## 2021-09-29 DIAGNOSIS — N13.6 PYONEPHROSIS: ICD-10-CM

## 2021-09-29 DIAGNOSIS — Z79.82 LONG TERM (CURRENT) USE OF ASPIRIN: ICD-10-CM

## 2021-10-23 ENCOUNTER — INPATIENT (INPATIENT)
Facility: HOSPITAL | Age: 61
LOS: 1 days | Discharge: HOME | End: 2021-10-25
Attending: UROLOGY | Admitting: UROLOGY
Payer: MEDICAID

## 2021-10-23 VITALS
SYSTOLIC BLOOD PRESSURE: 139 MMHG | DIASTOLIC BLOOD PRESSURE: 70 MMHG | RESPIRATION RATE: 20 BRPM | HEART RATE: 123 BPM | TEMPERATURE: 102 F | WEIGHT: 218.04 LBS | HEIGHT: 69 IN | OXYGEN SATURATION: 97 %

## 2021-10-23 DIAGNOSIS — N17.9 ACUTE KIDNEY FAILURE, UNSPECIFIED: ICD-10-CM

## 2021-10-23 DIAGNOSIS — N20.0 CALCULUS OF KIDNEY: ICD-10-CM

## 2021-10-23 DIAGNOSIS — E11.9 TYPE 2 DIABETES MELLITUS WITHOUT COMPLICATIONS: ICD-10-CM

## 2021-10-23 DIAGNOSIS — I10 ESSENTIAL (PRIMARY) HYPERTENSION: ICD-10-CM

## 2021-10-23 DIAGNOSIS — N20.1 CALCULUS OF URETER: ICD-10-CM

## 2021-10-23 DIAGNOSIS — N40.0 BENIGN PROSTATIC HYPERPLASIA WITHOUT LOWER URINARY TRACT SYMPTOMS: ICD-10-CM

## 2021-10-23 DIAGNOSIS — R07.9 CHEST PAIN, UNSPECIFIED: ICD-10-CM

## 2021-10-23 DIAGNOSIS — Z86.73 PERSONAL HISTORY OF TRANSIENT ISCHEMIC ATTACK (TIA), AND CEREBRAL INFARCTION WITHOUT RESIDUAL DEFICITS: ICD-10-CM

## 2021-10-23 LAB
ALBUMIN SERPL ELPH-MCNC: 3.1 G/DL — LOW (ref 3.3–5)
ALP SERPL-CCNC: 78 U/L — SIGNIFICANT CHANGE UP (ref 40–120)
ALT FLD-CCNC: 18 U/L — SIGNIFICANT CHANGE UP (ref 12–78)
ANION GAP SERPL CALC-SCNC: 8 MMOL/L — SIGNIFICANT CHANGE UP (ref 5–17)
APPEARANCE UR: CLEAR — SIGNIFICANT CHANGE UP
APTT BLD: 23.9 SEC — LOW (ref 27.5–35.5)
AST SERPL-CCNC: 27 U/L — SIGNIFICANT CHANGE UP (ref 15–37)
BACTERIA # UR AUTO: ABNORMAL
BASOPHILS # BLD AUTO: 0.01 K/UL — SIGNIFICANT CHANGE UP (ref 0–0.2)
BASOPHILS NFR BLD AUTO: 0.1 % — SIGNIFICANT CHANGE UP (ref 0–2)
BILIRUB SERPL-MCNC: 0.7 MG/DL — SIGNIFICANT CHANGE UP (ref 0.2–1.2)
BILIRUB UR-MCNC: NEGATIVE — SIGNIFICANT CHANGE UP
BLD GP AB SCN SERPL QL: SIGNIFICANT CHANGE UP
BUN SERPL-MCNC: 16 MG/DL — SIGNIFICANT CHANGE UP (ref 7–23)
CALCIUM SERPL-MCNC: 9.1 MG/DL — SIGNIFICANT CHANGE UP (ref 8.5–10.1)
CHLORIDE SERPL-SCNC: 106 MMOL/L — SIGNIFICANT CHANGE UP (ref 96–108)
CK MB BLD-MCNC: <1 % — SIGNIFICANT CHANGE UP (ref 0–3.5)
CK MB CFR SERPL CALC: <1 NG/ML — SIGNIFICANT CHANGE UP (ref 0.5–3.6)
CK SERPL-CCNC: 99 U/L — SIGNIFICANT CHANGE UP (ref 26–308)
CO2 SERPL-SCNC: 21 MMOL/L — LOW (ref 22–31)
COLOR SPEC: YELLOW — SIGNIFICANT CHANGE UP
CREAT SERPL-MCNC: 1.84 MG/DL — HIGH (ref 0.5–1.3)
DIFF PNL FLD: NEGATIVE — SIGNIFICANT CHANGE UP
EOSINOPHIL # BLD AUTO: 0.04 K/UL — SIGNIFICANT CHANGE UP (ref 0–0.5)
EOSINOPHIL NFR BLD AUTO: 0.4 % — SIGNIFICANT CHANGE UP (ref 0–6)
EPI CELLS # UR: SIGNIFICANT CHANGE UP
FLUAV AG NPH QL: SIGNIFICANT CHANGE UP
FLUBV AG NPH QL: SIGNIFICANT CHANGE UP
GLUCOSE BLDC GLUCOMTR-MCNC: 134 MG/DL — HIGH (ref 70–99)
GLUCOSE BLDC GLUCOMTR-MCNC: 141 MG/DL — HIGH (ref 70–99)
GLUCOSE BLDC GLUCOMTR-MCNC: 168 MG/DL — HIGH (ref 70–99)
GLUCOSE BLDC GLUCOMTR-MCNC: 183 MG/DL — HIGH (ref 70–99)
GLUCOSE SERPL-MCNC: 165 MG/DL — HIGH (ref 70–99)
GLUCOSE UR QL: NEGATIVE MG/DL — SIGNIFICANT CHANGE UP
HCT VFR BLD CALC: 31.8 % — LOW (ref 39–50)
HGB BLD-MCNC: 10 G/DL — LOW (ref 13–17)
IMM GRANULOCYTES NFR BLD AUTO: 0.3 % — SIGNIFICANT CHANGE UP (ref 0–1.5)
INR BLD: 1.06 RATIO — SIGNIFICANT CHANGE UP (ref 0.88–1.16)
KETONES UR-MCNC: ABNORMAL
LACTATE SERPL-SCNC: 1 MMOL/L — SIGNIFICANT CHANGE UP (ref 0.7–2)
LEUKOCYTE ESTERASE UR-ACNC: ABNORMAL
LYMPHOCYTES # BLD AUTO: 0.59 K/UL — LOW (ref 1–3.3)
LYMPHOCYTES # BLD AUTO: 6.1 % — LOW (ref 13–44)
MAGNESIUM SERPL-MCNC: 1.8 MG/DL — SIGNIFICANT CHANGE UP (ref 1.6–2.6)
MCHC RBC-ENTMCNC: 26.7 PG — LOW (ref 27–34)
MCHC RBC-ENTMCNC: 31.4 GM/DL — LOW (ref 32–36)
MCV RBC AUTO: 85 FL — SIGNIFICANT CHANGE UP (ref 80–100)
MONOCYTES # BLD AUTO: 0.27 K/UL — SIGNIFICANT CHANGE UP (ref 0–0.9)
MONOCYTES NFR BLD AUTO: 2.8 % — SIGNIFICANT CHANGE UP (ref 2–14)
NEUTROPHILS # BLD AUTO: 8.73 K/UL — HIGH (ref 1.8–7.4)
NEUTROPHILS NFR BLD AUTO: 90.3 % — HIGH (ref 43–77)
NITRITE UR-MCNC: POSITIVE
NRBC # BLD: 0 /100 WBCS — SIGNIFICANT CHANGE UP (ref 0–0)
PH UR: 6 — SIGNIFICANT CHANGE UP (ref 5–8)
PHOSPHATE SERPL-MCNC: 2 MG/DL — LOW (ref 2.5–4.5)
PLATELET # BLD AUTO: 231 K/UL — SIGNIFICANT CHANGE UP (ref 150–400)
POTASSIUM SERPL-MCNC: 4.9 MMOL/L — SIGNIFICANT CHANGE UP (ref 3.5–5.3)
POTASSIUM SERPL-SCNC: 4.9 MMOL/L — SIGNIFICANT CHANGE UP (ref 3.5–5.3)
PROT SERPL-MCNC: 7.7 GM/DL — SIGNIFICANT CHANGE UP (ref 6–8.3)
PROT UR-MCNC: 15 MG/DL
PROTHROM AB SERPL-ACNC: 12.3 SEC — SIGNIFICANT CHANGE UP (ref 10.6–13.6)
RBC # BLD: 3.74 M/UL — LOW (ref 4.2–5.8)
RBC # FLD: 14.4 % — SIGNIFICANT CHANGE UP (ref 10.3–14.5)
RBC CASTS # UR COMP ASSIST: SIGNIFICANT CHANGE UP /HPF (ref 0–4)
SARS-COV-2 RNA SPEC QL NAA+PROBE: SIGNIFICANT CHANGE UP
SODIUM SERPL-SCNC: 135 MMOL/L — SIGNIFICANT CHANGE UP (ref 135–145)
SP GR SPEC: 1.01 — SIGNIFICANT CHANGE UP (ref 1.01–1.02)
TROPONIN I SERPL-MCNC: 0.02 NG/ML — SIGNIFICANT CHANGE UP (ref 0.01–0.04)
UROBILINOGEN FLD QL: NEGATIVE MG/DL — SIGNIFICANT CHANGE UP
WBC # BLD: 9.67 K/UL — SIGNIFICANT CHANGE UP (ref 3.8–10.5)
WBC # FLD AUTO: 9.67 K/UL — SIGNIFICANT CHANGE UP (ref 3.8–10.5)
WBC UR QL: SIGNIFICANT CHANGE UP

## 2021-10-23 PROCEDURE — 93010 ELECTROCARDIOGRAM REPORT: CPT | Mod: 76

## 2021-10-23 PROCEDURE — 71045 X-RAY EXAM CHEST 1 VIEW: CPT | Mod: 26

## 2021-10-23 PROCEDURE — 99223 1ST HOSP IP/OBS HIGH 75: CPT

## 2021-10-23 PROCEDURE — 99233 SBSQ HOSP IP/OBS HIGH 50: CPT

## 2021-10-23 PROCEDURE — 74176 CT ABD & PELVIS W/O CONTRAST: CPT | Mod: 26,MA

## 2021-10-23 PROCEDURE — 99291 CRITICAL CARE FIRST HOUR: CPT

## 2021-10-23 RX ORDER — SODIUM CHLORIDE 9 MG/ML
1000 INJECTION, SOLUTION INTRAVENOUS
Refills: 0 | Status: DISCONTINUED | OUTPATIENT
Start: 2021-10-23 | End: 2021-10-25

## 2021-10-23 RX ORDER — DOXAZOSIN MESYLATE 4 MG
8 TABLET ORAL AT BEDTIME
Refills: 0 | Status: DISCONTINUED | OUTPATIENT
Start: 2021-10-23 | End: 2021-10-25

## 2021-10-23 RX ORDER — ASPIRIN/CALCIUM CARB/MAGNESIUM 324 MG
81 TABLET ORAL DAILY
Refills: 0 | Status: DISCONTINUED | OUTPATIENT
Start: 2021-10-23 | End: 2021-10-25

## 2021-10-23 RX ORDER — ONDANSETRON 8 MG/1
4 TABLET, FILM COATED ORAL ONCE
Refills: 0 | Status: COMPLETED | OUTPATIENT
Start: 2021-10-23 | End: 2021-10-23

## 2021-10-23 RX ORDER — FEBUXOSTAT 40 MG/1
40 TABLET ORAL EVERY 24 HOURS
Refills: 0 | Status: DISCONTINUED | OUTPATIENT
Start: 2021-10-23 | End: 2021-10-25

## 2021-10-23 RX ORDER — DEXTROSE 50 % IN WATER 50 %
25 SYRINGE (ML) INTRAVENOUS ONCE
Refills: 0 | Status: DISCONTINUED | OUTPATIENT
Start: 2021-10-23 | End: 2021-10-25

## 2021-10-23 RX ORDER — SODIUM BICARBONATE 1 MEQ/ML
650 SYRINGE (ML) INTRAVENOUS
Refills: 0 | Status: COMPLETED | OUTPATIENT
Start: 2021-10-23 | End: 2021-10-25

## 2021-10-23 RX ORDER — DOXAZOSIN MESYLATE 4 MG
8 TABLET ORAL DAILY
Refills: 0 | Status: DISCONTINUED | OUTPATIENT
Start: 2021-10-23 | End: 2021-10-23

## 2021-10-23 RX ORDER — ATORVASTATIN CALCIUM 80 MG/1
20 TABLET, FILM COATED ORAL AT BEDTIME
Refills: 0 | Status: DISCONTINUED | OUTPATIENT
Start: 2021-10-23 | End: 2021-10-25

## 2021-10-23 RX ORDER — INSULIN LISPRO 100/ML
VIAL (ML) SUBCUTANEOUS
Refills: 0 | Status: DISCONTINUED | OUTPATIENT
Start: 2021-10-23 | End: 2021-10-25

## 2021-10-23 RX ORDER — TAMSULOSIN HYDROCHLORIDE 0.4 MG/1
0.4 CAPSULE ORAL AT BEDTIME
Refills: 0 | Status: DISCONTINUED | OUTPATIENT
Start: 2021-10-23 | End: 2021-10-23

## 2021-10-23 RX ORDER — PANTOPRAZOLE SODIUM 20 MG/1
40 TABLET, DELAYED RELEASE ORAL DAILY
Refills: 0 | Status: DISCONTINUED | OUTPATIENT
Start: 2021-10-23 | End: 2021-10-25

## 2021-10-23 RX ORDER — SODIUM CHLORIDE 9 MG/ML
2200 INJECTION INTRAMUSCULAR; INTRAVENOUS; SUBCUTANEOUS ONCE
Refills: 0 | Status: COMPLETED | OUTPATIENT
Start: 2021-10-23 | End: 2021-10-23

## 2021-10-23 RX ORDER — DEXTROSE 50 % IN WATER 50 %
15 SYRINGE (ML) INTRAVENOUS ONCE
Refills: 0 | Status: DISCONTINUED | OUTPATIENT
Start: 2021-10-23 | End: 2021-10-25

## 2021-10-23 RX ORDER — MORPHINE SULFATE 50 MG/1
2 CAPSULE, EXTENDED RELEASE ORAL ONCE
Refills: 0 | Status: DISCONTINUED | OUTPATIENT
Start: 2021-10-23 | End: 2021-10-23

## 2021-10-23 RX ORDER — TAMSULOSIN HYDROCHLORIDE 0.4 MG/1
1 CAPSULE ORAL
Qty: 0 | Refills: 0 | DISCHARGE

## 2021-10-23 RX ORDER — ATENOLOL 25 MG/1
50 TABLET ORAL DAILY
Refills: 0 | Status: DISCONTINUED | OUTPATIENT
Start: 2021-10-23 | End: 2021-10-25

## 2021-10-23 RX ORDER — LANOLIN ALCOHOL/MO/W.PET/CERES
3 CREAM (GRAM) TOPICAL AT BEDTIME
Refills: 0 | Status: DISCONTINUED | OUTPATIENT
Start: 2021-10-23 | End: 2021-10-25

## 2021-10-23 RX ORDER — GLUCAGON INJECTION, SOLUTION 0.5 MG/.1ML
1 INJECTION, SOLUTION SUBCUTANEOUS ONCE
Refills: 0 | Status: DISCONTINUED | OUTPATIENT
Start: 2021-10-23 | End: 2021-10-25

## 2021-10-23 RX ORDER — ALLOPURINOL 300 MG
300 TABLET ORAL DAILY
Refills: 0 | Status: DISCONTINUED | OUTPATIENT
Start: 2021-10-23 | End: 2021-10-23

## 2021-10-23 RX ORDER — DEXTROSE 50 % IN WATER 50 %
12.5 SYRINGE (ML) INTRAVENOUS ONCE
Refills: 0 | Status: DISCONTINUED | OUTPATIENT
Start: 2021-10-23 | End: 2021-10-25

## 2021-10-23 RX ORDER — INSULIN LISPRO 100/ML
VIAL (ML) SUBCUTANEOUS AT BEDTIME
Refills: 0 | Status: DISCONTINUED | OUTPATIENT
Start: 2021-10-23 | End: 2021-10-25

## 2021-10-23 RX ORDER — SODIUM CHLORIDE 9 MG/ML
1000 INJECTION INTRAMUSCULAR; INTRAVENOUS; SUBCUTANEOUS
Refills: 0 | Status: DISCONTINUED | OUTPATIENT
Start: 2021-10-23 | End: 2021-10-25

## 2021-10-23 RX ORDER — SODIUM CHLORIDE 9 MG/ML
1000 INJECTION, SOLUTION INTRAVENOUS
Refills: 0 | Status: DISCONTINUED | OUTPATIENT
Start: 2021-10-23 | End: 2021-10-23

## 2021-10-23 RX ORDER — ACETAMINOPHEN 500 MG
650 TABLET ORAL EVERY 6 HOURS
Refills: 0 | Status: DISCONTINUED | OUTPATIENT
Start: 2021-10-23 | End: 2021-10-25

## 2021-10-23 RX ORDER — OXYCODONE HYDROCHLORIDE 5 MG/1
5 TABLET ORAL EVERY 4 HOURS
Refills: 0 | Status: DISCONTINUED | OUTPATIENT
Start: 2021-10-23 | End: 2021-10-25

## 2021-10-23 RX ORDER — LOSARTAN POTASSIUM 100 MG/1
100 TABLET, FILM COATED ORAL DAILY
Refills: 0 | Status: DISCONTINUED | OUTPATIENT
Start: 2021-10-23 | End: 2021-10-23

## 2021-10-23 RX ORDER — IBUPROFEN 200 MG
600 TABLET ORAL ONCE
Refills: 0 | Status: COMPLETED | OUTPATIENT
Start: 2021-10-23 | End: 2021-10-23

## 2021-10-23 RX ADMIN — Medication 3 MILLIGRAM(S): at 21:13

## 2021-10-23 RX ADMIN — Medication 650 MILLIGRAM(S): at 18:12

## 2021-10-23 RX ADMIN — Medication 650 MILLIGRAM(S): at 17:45

## 2021-10-23 RX ADMIN — PANTOPRAZOLE SODIUM 40 MILLIGRAM(S): 20 TABLET, DELAYED RELEASE ORAL at 13:00

## 2021-10-23 RX ADMIN — Medication 600 MILLIGRAM(S): at 05:44

## 2021-10-23 RX ADMIN — Medication 650 MILLIGRAM(S): at 17:19

## 2021-10-23 RX ADMIN — Medication 8 MILLIGRAM(S): at 21:52

## 2021-10-23 RX ADMIN — Medication 2: at 16:31

## 2021-10-23 RX ADMIN — OXYCODONE HYDROCHLORIDE 5 MILLIGRAM(S): 5 TABLET ORAL at 19:45

## 2021-10-23 RX ADMIN — OXYCODONE HYDROCHLORIDE 5 MILLIGRAM(S): 5 TABLET ORAL at 19:13

## 2021-10-23 RX ADMIN — FEBUXOSTAT 40 MILLIGRAM(S): 40 TABLET ORAL at 21:13

## 2021-10-23 RX ADMIN — Medication 600 MILLIGRAM(S): at 05:12

## 2021-10-23 RX ADMIN — Medication 2: at 07:39

## 2021-10-23 RX ADMIN — ONDANSETRON 4 MILLIGRAM(S): 8 TABLET, FILM COATED ORAL at 05:12

## 2021-10-23 RX ADMIN — SODIUM CHLORIDE 2200 MILLILITER(S): 9 INJECTION INTRAMUSCULAR; INTRAVENOUS; SUBCUTANEOUS at 05:12

## 2021-10-23 RX ADMIN — ATORVASTATIN CALCIUM 20 MILLIGRAM(S): 80 TABLET, FILM COATED ORAL at 21:13

## 2021-10-23 NOTE — H&P ADULT - HISTORY OF PRESENT ILLNESS
61yoM with PMH HTN, HLD, DM, BPH, TIA on Plavix and ASA who presented to ED C/o recurrent Right flank pain. Patient with recent admission 3 weeks ago for Right obstructive renal stone and [place], patient had refused surgery at that time and wanted to have a chance to pass the stone, he states that he was feeling OK at home and did pass multiple stone fragments, however his Right flank pain returned overnight waking him up from sleep, rated as 5/10 in severity with no known alleviating factors. Denies N/V or abdominal pain, feels "bloated." Also endorses fever and chills.  Denies chest pains, sob, dizziness, HA, Hematuria, Dysuria, constipation/diarrhea.  61yoM with PMH HTN, HLD, DM, BPH, TIA on Plavix and ASA who presented to ED C/o recurrent Right flank pain. Patient with recent admission 3 weeks ago for Right UPJ obstructive renal stone and [place], patient had refused surgery at that time and wanted to have a chance to pass the stone, he states that he was feeling OK at home and did pass multiple stones sent for analysis. Right flank pain returned overnight waking him up from sleep, rated as 5/10 in severity with no known alleviating factors. Denies N/V or abdominal pain, feels "bloated." Also endorses fever and chills.  Denies chest pains, sob, dizziness, HA, Hematuria, Dysuria, constipation/diarrhea.

## 2021-10-23 NOTE — H&P ADULT - ASSESSMENT
60yo Male with PMH HTN, HLD, DM, BPH, TIA on Plavix and ASA who presented to ED C/o recurrent Right flank pain, found to have Right proximal ureteral calculus with mild Hydronephrosis.  Febrile (Temp 102.1) and Tachycardic in the ER.      60yo Male with PMH HTN, HLD, DM, BPH, TIA on Plavix and ASA who presented to ED C/o recurrent Right flank pain, found to have Right UPJ calculus with mild Hydronephrosis. Multiple bladder stones with Large Prostate    Febrile (Temp 102.1) and Tachycardic in the ER.

## 2021-10-23 NOTE — ED ADULT NURSE NOTE - SKIN CAPILLARY REFILL
patient has an infection in right middle finger from possible biting on finger nad pulling cuticle off  patient is autistic 2 seconds or less

## 2021-10-23 NOTE — ED ADULT TRIAGE NOTE - CHIEF COMPLAINT QUOTE
complain of body pain, chills, right flank pain nausea, intermittent penile pain started today, denies cough, sore throat, denies trouble when urinating. took tylenol at 0130.

## 2021-10-23 NOTE — CONSULT NOTE ADULT - PROBLEM SELECTOR RECOMMENDATION 9
CT abdomen: Persistent mild right hydroureteronephrosis with slightly decreased perinephric stranding.  UA: infectious  As per Urology pt to go for Right Ureterscopy w/ stent insertion   cont w/ IV abx, mgmt as per Urology

## 2021-10-23 NOTE — H&P ADULT - NSHPPHYSICALEXAM_GEN_ALL_CORE
General: Appears stated age, No acute distress, WD/WN  Head: NC/AT  EENT: PERRLA. EOMI. Conjunctiva and sclera clear. Pharynx clear.  Neck: Supple.  Lungs: CTA B/l. Nonlabored Respirations  CV: +S1S2, RRR  Abdomen: Nondistended, +BS, Soft, Nontender, no guarding, no rebound  : No SP tenderness to bladder distention. No CVA tenderness b/l.   Extremities: Warm and well perfused. 2+ peripheral pulses b/l. Calf soft, nontender b/l. No pedal edema.  Neuro: A&Ox3. General: Appears stated age, No acute distress, WD/WN  Head: NC/AT  EENT: PERRLA. EOMI. Conjunctiva and sclera clear. Pharynx clear.  Neck: Supple.  Lungs: CTA B/l. Nonlabored Respirations  CV: +S1S2, RRR  Abdomen: Nondistended, +BS, Soft, Nontender, no guarding, no rebound,   : No SP tenderness to bladder distention. Right  CVA tenderness present.    Extremities: Warm and well perfused. 2+ peripheral pulses b/l. Calf soft, nontender b/l. No pedal edema.  Neuro: A&Ox3.

## 2021-10-23 NOTE — CHART NOTE - NSCHARTNOTEFT_GEN_A_CORE
Patient tells me that he does not want to have procedure now as he is not feeling well. Myself and anesthesiologist spoke to him about putting stent. Pt declines at present. Procedure will be cancelled.   Will continue antibiotics.   As pt is on Doxazosin, will stop Flomax.  Looks like uric acid stones.   Multiple bladder stones. With large prostate.

## 2021-10-23 NOTE — ED PROVIDER NOTE - CLINICAL SUMMARY MEDICAL DECISION MAKING FREE TEXT BOX
Ddx: Sepsis from renal stone/ ro covid/ No cough to suggest pna  Plan: Cbc, cmp, lactate, blood cx, fluids, ct renal, antipyretic, pain control, reassess

## 2021-10-23 NOTE — ED PROVIDER NOTE - ENMT, MLM
Airway patent, Nasal mucosa clear. Mouth with normal mucosa. Throat has no vesicles, no oropharyngeal exudates and uvula is midline. Telemetry

## 2021-10-23 NOTE — H&P ADULT - PROBLEM SELECTOR PLAN 1
As discussed with Dr. Francis, will admit to Urology service and Book patient for the OR today for urgent Right ureteroscopy and stent insertion.  NPO, IVF resuscitation  IV ABX (Levaquin due to PCN allergy)  Pain control and antiemetic PRN  Monitor for temps, antipyretics PRN  F/u preop labs  Medicine consult for comanagement

## 2021-10-23 NOTE — ED PROVIDER NOTE - CARE PLAN
1 Principal Discharge DX:	Sepsis, unspecified organism   Principal Discharge DX:	Sepsis, unspecified organism  Secondary Diagnosis:	Kidney stone

## 2021-10-23 NOTE — ED PROVIDER NOTE - OBJECTIVE STATEMENT
Pt is a 60 yo gentleman with a pmhx of HTN, DM, BPH, recent obstructing renal stone who presents to the ED with R. flank pain and fever starting this morning. Has had body aches. No cough, no sore throat, no rhinorrhea. No chest pain, no abdominal pain. Has had some R. flank pain, no dysuria, no hematuria. Was admitted for obstructing renal stone 1 month ago, and d/c on outpatient abx.

## 2021-10-23 NOTE — ED ADULT NURSE NOTE - OBJECTIVE STATEMENT
Pt presents to the ED A&Ox4 co fevers and chills. Pt states he woke up this evening with fevers, nausea, abd pain. Pt denies cough, CP, vomiting, headache. Pt placed on on cardiac monitor at bedside.

## 2021-10-23 NOTE — ED ADULT NURSE NOTE - ED STAT RN HANDOFF DETAILS
Report endorsed to hold Brandon MCDONALD. Safety checks completed this shift. Safety rounds completed hourly.  IV sites checked Q2+remains WDL. Medications administered as ordered with no signs/symptoms of adverse reactions. Fall & skin precautions in place. Any issues endorsed to oncoming RN for follow up.

## 2021-10-24 DIAGNOSIS — R78.81 BACTEREMIA: ICD-10-CM

## 2021-10-24 DIAGNOSIS — Z29.9 ENCOUNTER FOR PROPHYLACTIC MEASURES, UNSPECIFIED: ICD-10-CM

## 2021-10-24 DIAGNOSIS — N39.0 URINARY TRACT INFECTION, SITE NOT SPECIFIED: ICD-10-CM

## 2021-10-24 DIAGNOSIS — D64.9 ANEMIA, UNSPECIFIED: ICD-10-CM

## 2021-10-24 LAB
-  STAPHYLOCOCCUS EPIDERMIDIS: SIGNIFICANT CHANGE UP
ALBUMIN SERPL ELPH-MCNC: 2.3 G/DL — LOW (ref 3.3–5)
ALP SERPL-CCNC: 69 U/L — SIGNIFICANT CHANGE UP (ref 40–120)
ALT FLD-CCNC: 13 U/L — SIGNIFICANT CHANGE UP (ref 12–78)
ANION GAP SERPL CALC-SCNC: 6 MMOL/L — SIGNIFICANT CHANGE UP (ref 5–17)
AST SERPL-CCNC: 14 U/L — LOW (ref 15–37)
BILIRUB SERPL-MCNC: 0.4 MG/DL — SIGNIFICANT CHANGE UP (ref 0.2–1.2)
BUN SERPL-MCNC: 17 MG/DL — SIGNIFICANT CHANGE UP (ref 7–23)
CALCIUM SERPL-MCNC: 8.3 MG/DL — LOW (ref 8.5–10.1)
CHLORIDE SERPL-SCNC: 111 MMOL/L — HIGH (ref 96–108)
CO2 SERPL-SCNC: 23 MMOL/L — SIGNIFICANT CHANGE UP (ref 22–31)
COVID-19 SPIKE DOMAIN AB INTERP: POSITIVE
COVID-19 SPIKE DOMAIN ANTIBODY RESULT: >250 U/ML — HIGH
CREAT SERPL-MCNC: 1.74 MG/DL — HIGH (ref 0.5–1.3)
FERRITIN SERPL-MCNC: 245 NG/ML — SIGNIFICANT CHANGE UP (ref 30–400)
FOLATE SERPL-MCNC: 12 NG/ML — SIGNIFICANT CHANGE UP
GLUCOSE BLDC GLUCOMTR-MCNC: 142 MG/DL — HIGH (ref 70–99)
GLUCOSE BLDC GLUCOMTR-MCNC: 144 MG/DL — HIGH (ref 70–99)
GLUCOSE BLDC GLUCOMTR-MCNC: 162 MG/DL — HIGH (ref 70–99)
GLUCOSE BLDC GLUCOMTR-MCNC: 163 MG/DL — HIGH (ref 70–99)
GLUCOSE SERPL-MCNC: 138 MG/DL — HIGH (ref 70–99)
HCT VFR BLD CALC: 28.4 % — LOW (ref 39–50)
HGB BLD-MCNC: 8.5 G/DL — LOW (ref 13–17)
IRON SATN MFR SERPL: 15 % — LOW (ref 16–55)
IRON SATN MFR SERPL: 29 UG/DL — LOW (ref 45–165)
MCHC RBC-ENTMCNC: 26 PG — LOW (ref 27–34)
MCHC RBC-ENTMCNC: 29.9 GM/DL — LOW (ref 32–36)
MCV RBC AUTO: 86.9 FL — SIGNIFICANT CHANGE UP (ref 80–100)
METHOD TYPE: SIGNIFICANT CHANGE UP
NRBC # BLD: 0 /100 WBCS — SIGNIFICANT CHANGE UP (ref 0–0)
PLATELET # BLD AUTO: 231 K/UL — SIGNIFICANT CHANGE UP (ref 150–400)
POTASSIUM SERPL-MCNC: 3.8 MMOL/L — SIGNIFICANT CHANGE UP (ref 3.5–5.3)
POTASSIUM SERPL-SCNC: 3.8 MMOL/L — SIGNIFICANT CHANGE UP (ref 3.5–5.3)
PROT SERPL-MCNC: 6.5 GM/DL — SIGNIFICANT CHANGE UP (ref 6–8.3)
RBC # BLD: 3.27 M/UL — LOW (ref 4.2–5.8)
RBC # FLD: 14.6 % — HIGH (ref 10.3–14.5)
SARS-COV-2 IGG+IGM SERPL QL IA: >250 U/ML — HIGH
SARS-COV-2 IGG+IGM SERPL QL IA: POSITIVE
SODIUM SERPL-SCNC: 140 MMOL/L — SIGNIFICANT CHANGE UP (ref 135–145)
TIBC SERPL-MCNC: 194 UG/DL — LOW (ref 220–430)
UIBC SERPL-MCNC: 165 UG/DL — SIGNIFICANT CHANGE UP (ref 110–370)
VIT B12 SERPL-MCNC: 929 PG/ML — SIGNIFICANT CHANGE UP (ref 232–1245)
WBC # BLD: 15.28 K/UL — HIGH (ref 3.8–10.5)
WBC # FLD AUTO: 15.28 K/UL — HIGH (ref 3.8–10.5)

## 2021-10-24 PROCEDURE — 99233 SBSQ HOSP IP/OBS HIGH 50: CPT

## 2021-10-24 RX ORDER — ATORVASTATIN CALCIUM 80 MG/1
1 TABLET, FILM COATED ORAL
Qty: 0 | Refills: 0 | DISCHARGE

## 2021-10-24 RX ORDER — SENNA PLUS 8.6 MG/1
2 TABLET ORAL AT BEDTIME
Refills: 0 | Status: DISCONTINUED | OUTPATIENT
Start: 2021-10-24 | End: 2021-10-25

## 2021-10-24 RX ORDER — TAMSULOSIN HYDROCHLORIDE 0.4 MG/1
1 CAPSULE ORAL
Qty: 0 | Refills: 0 | DISCHARGE

## 2021-10-24 RX ORDER — LOSARTAN POTASSIUM 100 MG/1
1 TABLET, FILM COATED ORAL
Qty: 0 | Refills: 0 | DISCHARGE

## 2021-10-24 RX ORDER — DOXAZOSIN MESYLATE 4 MG
1 TABLET ORAL
Qty: 0 | Refills: 0 | DISCHARGE

## 2021-10-24 RX ORDER — CLOPIDOGREL BISULFATE 75 MG/1
1 TABLET, FILM COATED ORAL
Qty: 0 | Refills: 0 | DISCHARGE

## 2021-10-24 RX ORDER — ALOGLIPTIN 12.5 MG/1
1 TABLET, FILM COATED ORAL
Qty: 0 | Refills: 0 | DISCHARGE

## 2021-10-24 RX ORDER — LOSARTAN/HYDROCHLOROTHIAZIDE 100MG-25MG
1 TABLET ORAL
Qty: 0 | Refills: 0 | DISCHARGE

## 2021-10-24 RX ORDER — AMLODIPINE BESYLATE 2.5 MG/1
10 TABLET ORAL DAILY
Refills: 0 | Status: DISCONTINUED | OUTPATIENT
Start: 2021-10-24 | End: 2021-10-25

## 2021-10-24 RX ORDER — METFORMIN HYDROCHLORIDE 850 MG/1
1 TABLET ORAL
Qty: 0 | Refills: 0 | DISCHARGE

## 2021-10-24 RX ORDER — HEPARIN SODIUM 5000 [USP'U]/ML
5000 INJECTION INTRAVENOUS; SUBCUTANEOUS EVERY 8 HOURS
Refills: 0 | Status: DISCONTINUED | OUTPATIENT
Start: 2021-10-24 | End: 2021-10-25

## 2021-10-24 RX ORDER — AMLODIPINE BESYLATE 2.5 MG/1
1 TABLET ORAL
Qty: 0 | Refills: 0 | DISCHARGE

## 2021-10-24 RX ADMIN — HEPARIN SODIUM 5000 UNIT(S): 5000 INJECTION INTRAVENOUS; SUBCUTANEOUS at 13:40

## 2021-10-24 RX ADMIN — SENNA PLUS 2 TABLET(S): 8.6 TABLET ORAL at 13:41

## 2021-10-24 RX ADMIN — OXYCODONE HYDROCHLORIDE 5 MILLIGRAM(S): 5 TABLET ORAL at 17:00

## 2021-10-24 RX ADMIN — Medication 650 MILLIGRAM(S): at 05:12

## 2021-10-24 RX ADMIN — Medication 3 MILLIGRAM(S): at 21:08

## 2021-10-24 RX ADMIN — AMLODIPINE BESYLATE 10 MILLIGRAM(S): 2.5 TABLET ORAL at 08:47

## 2021-10-24 RX ADMIN — Medication 650 MILLIGRAM(S): at 17:13

## 2021-10-24 RX ADMIN — ATENOLOL 50 MILLIGRAM(S): 25 TABLET ORAL at 05:12

## 2021-10-24 RX ADMIN — OXYCODONE HYDROCHLORIDE 5 MILLIGRAM(S): 5 TABLET ORAL at 21:08

## 2021-10-24 RX ADMIN — Medication 81 MILLIGRAM(S): at 11:26

## 2021-10-24 RX ADMIN — Medication 2: at 11:22

## 2021-10-24 RX ADMIN — ATORVASTATIN CALCIUM 20 MILLIGRAM(S): 80 TABLET, FILM COATED ORAL at 21:08

## 2021-10-24 RX ADMIN — Medication 8 MILLIGRAM(S): at 17:13

## 2021-10-24 RX ADMIN — OXYCODONE HYDROCHLORIDE 5 MILLIGRAM(S): 5 TABLET ORAL at 22:10

## 2021-10-24 RX ADMIN — OXYCODONE HYDROCHLORIDE 5 MILLIGRAM(S): 5 TABLET ORAL at 16:29

## 2021-10-24 RX ADMIN — PANTOPRAZOLE SODIUM 40 MILLIGRAM(S): 20 TABLET, DELAYED RELEASE ORAL at 11:26

## 2021-10-24 RX ADMIN — FEBUXOSTAT 40 MILLIGRAM(S): 40 TABLET ORAL at 21:08

## 2021-10-24 NOTE — CONSULT NOTE ADULT - PROBLEM SELECTOR RECOMMENDATION 9
CT abdomen: Persistent mild right hydroureteronephrosis with slightly decreased perinephric stranding.  UA: infectious  As per Urology pt to go for Right Ureterscopy w/ stent insertion; pt is currently refusing interventional procedures   cont w/ IV abx, mgmt as per Urology DVTp: heparin

## 2021-10-24 NOTE — CONSULT NOTE ADULT - TIME BILLING
Lab test review,  Vitals review, Consultant review and discussion, Physical examination, Assessment and plan; Plan discussion with patient

## 2021-10-24 NOTE — CONSULT NOTE ADULT - ASSESSMENT
Revised Cardiac Risk index is 10.1% for Major Cardiac Event  follow up EKG and Cardiac Enzymes for medical optimization 
Revised Cardiac Risk index is 10.1% for Major Cardiac Event  follow up EKG and Cardiac Enzymes for medical optimization

## 2021-10-24 NOTE — PROGRESS NOTE ADULT - SUBJECTIVE AND OBJECTIVE BOX
Patient seen and examined at bedside in no distress.  No complaints offered. States he thinks he passed some "gravel" in his urine overnight.  Denies abdominal pain, nausea, vomiting, back pain.  Denies dysuria, gross hematuria, urinary frequency, fever, chills.     T(F): 97.7 (10-24-21 @ 05:53), Max: 99.4 (10-23-21 @ 08:13)  HR: 86 (10-24-21 @ 05:53) (67 - 100)  BP: 110/71 (10-24-21 @ 05:53) (110/71 - 145/74)  RR: 18 (10-24-21 @ 05:53) (16 - 18)  SpO2: 95% (10-24-21 @ 05:53) (95% - 99%)    PHYSICAL EXAM:  General: Alert, oriented, NAD  CV: +S1S2 regular rate and rhythm  Lung: Respirations nonlabored  Abdomen: Soft, NTND  Extremities: No pedal edema or calf tenderness noted   : No SP tenderness, no CVA tenderness b/l     LABS:                        8.5    15.28 )-----------( 231      ( 24 Oct 2021 07:01 )             28.4     10-24    140  |  111<H>  |  17  ----------------------------<  138<H>  3.8   |  23  |  1.74<H>    Ca    8.3<L>      24 Oct 2021 07:01  Phos  2.0     10-23  Mg     1.8     10-23    TPro  6.5  /  Alb  2.3<L>  /  TBili  0.4  /  DBili  x   /  AST  14<L>  /  ALT  13  /  AlkPhos  69  10-24    PT/INR - ( 23 Oct 2021 05:03 )   PT: 12.3 sec;   INR: 1.06 ratio         A/P: 61M PMH HTN, HLD, DM, BPH, TIA on Plavix and ASA a/w recurrent right flank pain, R UPJ stone with mild hydronephrosis, multiple bladder stones, large prostate. Improved flank pain today. Now with elevated WBC. Pt adamantly refusing surgical intervention. Afebrile. Pain resolved.   - continue antibiotics, f/u cultures  - continue to strain urine  - cardura, sodium bicarb,   - pain meds PRN  - medical comanagement  - will d/w surgeon   Patient seen and examined at bedside in no distress.  No complaints offered. States he thinks he passed some "gravel" in his urine overnight.  Denies abdominal pain, nausea, vomiting, back pain.  Denies dysuria, gross hematuria, urinary frequency, fever, chills.     T(F): 97.7 (10-24-21 @ 05:53), Max: 99.4 (10-23-21 @ 08:13)  HR: 86 (10-24-21 @ 05:53) (67 - 100)  BP: 110/71 (10-24-21 @ 05:53) (110/71 - 145/74)  RR: 18 (10-24-21 @ 05:53) (16 - 18)  SpO2: 95% (10-24-21 @ 05:53) (95% - 99%)    PHYSICAL EXAM:  General: Alert, oriented, NAD  CV: +S1S2 regular rate and rhythm  Lung: Respirations nonlabored  Abdomen: Soft, NTND  Extremities: No pedal edema or calf tenderness noted   : No SP tenderness, no CVA tenderness b/l     LABS:                        8.5    15.28 )-----------( 231      ( 24 Oct 2021 07:01 )             28.4     10-24    140  |  111<H>  |  17  ----------------------------<  138<H>  3.8   |  23  |  1.74<H>    Ca    8.3<L>      24 Oct 2021 07:01  Phos  2.0     10-23  Mg     1.8     10-23    TPro  6.5  /  Alb  2.3<L>  /  TBili  0.4  /  DBili  x   /  AST  14<L>  /  ALT  13  /  AlkPhos  69  10-24    PT/INR - ( 23 Oct 2021 05:03 )   PT: 12.3 sec;   INR: 1.06 ratio         A/P: 61M PMH HTN, HLD, DM, BPH, TIA on Plavix and ASA a/w recurrent right flank pain, R UPJ stone with mild hydronephrosis, multiple bladder stones, large prostate. Possible uric acid urolithiasis. Improved flank pain today. Now with elevated WBC. Pt adamantly refusing surgical intervention. Afebrile. Pain resolved.   - continue antibiotics, f/u cultures  - continue to strain urine  - low purine diet  - cardura, sodium bicarb, uloric  - pain meds PRN  - f/u repeat blood culture/urine culture  - medical comanagement  - discussed with surgeon

## 2021-10-25 ENCOUNTER — TRANSCRIPTION ENCOUNTER (OUTPATIENT)
Age: 61
End: 2021-10-25

## 2021-10-25 VITALS
SYSTOLIC BLOOD PRESSURE: 147 MMHG | RESPIRATION RATE: 18 BRPM | TEMPERATURE: 99 F | OXYGEN SATURATION: 96 % | DIASTOLIC BLOOD PRESSURE: 74 MMHG | HEART RATE: 73 BPM

## 2021-10-25 LAB
ANION GAP SERPL CALC-SCNC: 4 MMOL/L — LOW (ref 5–17)
BUN SERPL-MCNC: 14 MG/DL — SIGNIFICANT CHANGE UP (ref 7–23)
CALCIUM SERPL-MCNC: 8.6 MG/DL — SIGNIFICANT CHANGE UP (ref 8.5–10.1)
CHLORIDE SERPL-SCNC: 107 MMOL/L — SIGNIFICANT CHANGE UP (ref 96–108)
CO2 SERPL-SCNC: 27 MMOL/L — SIGNIFICANT CHANGE UP (ref 22–31)
CREAT SERPL-MCNC: 1.59 MG/DL — HIGH (ref 0.5–1.3)
CULTURE RESULTS: SIGNIFICANT CHANGE UP
GLUCOSE BLDC GLUCOMTR-MCNC: 149 MG/DL — HIGH (ref 70–99)
GLUCOSE BLDC GLUCOMTR-MCNC: 153 MG/DL — HIGH (ref 70–99)
GLUCOSE BLDC GLUCOMTR-MCNC: 196 MG/DL — HIGH (ref 70–99)
GLUCOSE SERPL-MCNC: 193 MG/DL — HIGH (ref 70–99)
HCT VFR BLD CALC: 29 % — LOW (ref 39–50)
HGB BLD-MCNC: 9.3 G/DL — LOW (ref 13–17)
MCHC RBC-ENTMCNC: 26.6 PG — LOW (ref 27–34)
MCHC RBC-ENTMCNC: 32.1 GM/DL — SIGNIFICANT CHANGE UP (ref 32–36)
MCV RBC AUTO: 83.1 FL — SIGNIFICANT CHANGE UP (ref 80–100)
NRBC # BLD: 0 /100 WBCS — SIGNIFICANT CHANGE UP (ref 0–0)
PLATELET # BLD AUTO: 276 K/UL — SIGNIFICANT CHANGE UP (ref 150–400)
POTASSIUM SERPL-MCNC: 3.9 MMOL/L — SIGNIFICANT CHANGE UP (ref 3.5–5.3)
POTASSIUM SERPL-SCNC: 3.9 MMOL/L — SIGNIFICANT CHANGE UP (ref 3.5–5.3)
RBC # BLD: 3.49 M/UL — LOW (ref 4.2–5.8)
RBC # FLD: 14.6 % — HIGH (ref 10.3–14.5)
SODIUM SERPL-SCNC: 138 MMOL/L — SIGNIFICANT CHANGE UP (ref 135–145)
SPECIMEN SOURCE: SIGNIFICANT CHANGE UP
WBC # BLD: 12.27 K/UL — HIGH (ref 3.8–10.5)
WBC # FLD AUTO: 12.27 K/UL — HIGH (ref 3.8–10.5)

## 2021-10-25 PROCEDURE — 99232 SBSQ HOSP IP/OBS MODERATE 35: CPT

## 2021-10-25 RX ORDER — ONDANSETRON 8 MG/1
4 TABLET, FILM COATED ORAL EVERY 6 HOURS
Refills: 0 | Status: DISCONTINUED | OUTPATIENT
Start: 2021-10-25 | End: 2021-10-25

## 2021-10-25 RX ORDER — FERROUS SULFATE 325(65) MG
1 TABLET ORAL
Qty: 30 | Refills: 0
Start: 2021-10-25

## 2021-10-25 RX ORDER — FEBUXOSTAT 40 MG/1
1 TABLET ORAL
Qty: 30 | Refills: 0
Start: 2021-10-25 | End: 2021-11-23

## 2021-10-25 RX ORDER — FERROUS SULFATE 325(65) MG
325 TABLET ORAL
Refills: 0 | Status: DISCONTINUED | OUTPATIENT
Start: 2021-10-25 | End: 2021-10-25

## 2021-10-25 RX ADMIN — Medication 2: at 12:09

## 2021-10-25 RX ADMIN — ONDANSETRON 4 MILLIGRAM(S): 8 TABLET, FILM COATED ORAL at 07:53

## 2021-10-25 RX ADMIN — Medication 325 MILLIGRAM(S): at 17:51

## 2021-10-25 RX ADMIN — ATENOLOL 50 MILLIGRAM(S): 25 TABLET ORAL at 05:18

## 2021-10-25 RX ADMIN — AMLODIPINE BESYLATE 10 MILLIGRAM(S): 2.5 TABLET ORAL at 05:18

## 2021-10-25 RX ADMIN — PANTOPRAZOLE SODIUM 40 MILLIGRAM(S): 20 TABLET, DELAYED RELEASE ORAL at 12:08

## 2021-10-25 RX ADMIN — Medication 650 MILLIGRAM(S): at 05:18

## 2021-10-25 RX ADMIN — Medication 81 MILLIGRAM(S): at 12:09

## 2021-10-25 NOTE — CONSULT NOTE ADULT - PROBLEM SELECTOR RECOMMENDATION 9
CT abdomen: Persistent mild right hydroureteronephrosis with slightly decreased perinephric stranding.  UA: infectious  As per Urology pt to go for Right Ureteroscopy w/ stent insertion; pt is currently refusing interventional procedures   cont w/ IV abx, mgmt as per Urology DVTp: heparin

## 2021-10-25 NOTE — CONSULT NOTE ADULT - PROBLEM SELECTOR RECOMMENDATION 2
UA: appears infectious in the setting of obstructive Nephrolithiasis   +Leukocytosis   cont w/ IV abx and f/u Urine culture
IVF given in ER   follow up renal function and avoid nephrotoxic medications   consider holding Losartan until renal function recovers
UA: appears infectious in the setting of obstructive Nephrolithiasis   +Leukocytosis, Urine cx contaminated sample    cont w/ IV abx

## 2021-10-25 NOTE — DISCHARGE NOTE PROVIDER - HOSPITAL COURSE
61yoM with PMH HTN, HLD, DM, BPH, TIA on Plavix and ASA who presented to ED C/o recurrent Right flank pain. Patient with recent admission 3 weeks ago for Right UPJ obstructive renal stone and AUSTIN, patient had refused surgery at that time and wanted to have a chance to pass the stone, he states that he was feeling OK at home and did pass multiple stones sent for analysis. Right flank pain returned overnight waking him up from sleep, rated as 5/10 in severity with no known alleviating factors. Denies N/V or abdominal pain, feels "bloated." Also endorses fever and chills.  Denies chest pains, sob, dizziness, HA, Hematuria, Dysuria, constipation/diarrhea.       During this admission CT showed mild right hydro with proximal stone. Based on previous CT patient also has numerous bladder stones. During this admission patient was febrile, elevated WBC 15, Cr WNL. Started on IV abx and since stones thought to be uric acid based on imaging, started on Uloric. Patient's pain resolved during hospital day 2. Patient was still adamantly refusing surgery. Went over surgery with patient, patient still refused. It was explained that patient could risk, sepsis, death, and possible permanent kidney injury. Patient understood these risks but also stated he does not want surgery and wants to try medication. Patient was dc'd with prescription for antibiotics and uloric and understood the risks of refusing surgery.

## 2021-10-25 NOTE — CONSULT NOTE ADULT - PROBLEM SELECTOR RECOMMENDATION 3
Gram positive cocci; ?contamination   +leukocytosis, Afebrile   will repeat blood cx
continue with Home medications Atenolol   consider hold Losartan due to ARF
no
Blood cx 1out of 2 Staph Epidermis; presumed contamination   +leukocytosis, Afebrile   f/u repeat blood cx

## 2021-10-25 NOTE — DISCHARGE NOTE NURSING/CASE MANAGEMENT/SOCIAL WORK - PATIENT PORTAL LINK FT
You can access the FollowMyHealth Patient Portal offered by [facility] by registering at the following website: [email or web address]. By joining [employer]’s FollowMyHealth portal, you will also be able to view your health information using other applications (apps) compatible with our system.

## 2021-10-25 NOTE — CONSULT NOTE ADULT - PROBLEM SELECTOR RECOMMENDATION 8
TATY based on Anemia workup   will start ferrous sulfate   follow up with H/H and iron levels with PCP as outpatient
will get anemia workup

## 2021-10-25 NOTE — DISCHARGE NOTE PROVIDER - CARE PROVIDER_API CALL
Carlito Francis)  Urology  [address], [address] [place] [address]  Phone: [phone]  Fax: [phone]  Follow Up Time: 1 week

## 2021-10-25 NOTE — DISCHARGE NOTE PROVIDER - NSDCMRMEDTOKEN_GEN_ALL_CORE_FT
Alogliptin 25 mg oral tablet: 1 tab(s) orally once a day  amLODIPine 10 mg oral tablet: 1 tab(s) orally once a day  aspirin 81 mg oral delayed release tablet: 1 tab(s) orally once a day  atenolol 50 mg oral tablet: 1 tab(s) orally once a day  atorvastatin 20 mg oral tablet: 1 tab(s) orally once a day  clopidogrel 75 mg oral tablet: 1 tab(s) orally once a day  doxazosin 8 mg oral tablet: 1 tab(s) orally 2 times a day  febuxostat 40 mg oral tablet: 1 tab(s) orally every 24 hours  Flomax 0.4 mg oral capsule: 1 cap(s) orally 2 times a day  glipiZIDE 10 mg oral tablet, extended release: 1 tab(s) orally once a day  levoFLOXacin 500 mg oral tablet: 1 tab(s) orally 2 times a day   losartan 100 mg oral tablet: 1 tab(s) orally once a day  losartan-hydrochlorothiazide 100 mg-25 mg oral tablet: 1 tab(s) orally once a day  metFORMIN 1000 mg oral tablet: 1 tab(s) orally 2 times a day   Alogliptin 25 mg oral tablet: 1 tab(s) orally once a day  amLODIPine 10 mg oral tablet: 1 tab(s) orally once a day  aspirin 81 mg oral delayed release tablet: 1 tab(s) orally once a day  atenolol 50 mg oral tablet: 1 tab(s) orally once a day  atorvastatin 20 mg oral tablet: 1 tab(s) orally once a day  clopidogrel 75 mg oral tablet: 1 tab(s) orally once a day  doxazosin 8 mg oral tablet: 1 tab(s) orally 2 times a day  febuxostat 40 mg oral tablet: 1 tab(s) orally every 24 hours MDD:1  ferrous sulfate 324 mg (65 mg elemental iron) oral delayed release tablet: 1 tab(s) orally once a day MDD:1  Flomax 0.4 mg oral capsule: 1 cap(s) orally 2 times a day  glipiZIDE 10 mg oral tablet, extended release: 1 tab(s) orally once a day  levoFLOXacin 500 mg oral tablet: 1 tab(s) orally every 24 hours MDD:1  losartan 100 mg oral tablet: 1 tab(s) orally once a day  losartan-hydrochlorothiazide 100 mg-25 mg oral tablet: 1 tab(s) orally once a day  metFORMIN 1000 mg oral tablet: 1 tab(s) orally 2 times a day

## 2021-10-25 NOTE — CONSULT NOTE ADULT - REASON FOR ADMISSION
Right flank pain, Right infected ureteral stone

## 2021-10-25 NOTE — CONSULT NOTE ADULT - PROBLEM SELECTOR RECOMMENDATION 6
+reproducible on PE, Presumed Musculoskeletal Atypical Chest pain   f/u EKG and troponin levels
HgA1c: 10.1% (9/21) uncontrolled   Hold home oral hypoglycemic medications.   cont w/ FS monitoring w/ insulin s/s coverage   pt may resume home regimen on discharge
HgA1c: 10.1% (9/21) uncontrolled   Hold home oral hypoglycemic medications.   cont w/ FS monitoring w/ insulin s/s coverage   pt may resume home regimen on discharge

## 2021-10-25 NOTE — CONSULT NOTE ADULT - PROBLEM SELECTOR RECOMMENDATION 5
hold Losartan due to ARF  continue with Atenolol and norvasc
ASA was held,   resumed ASA and Atorvastatin post op
hold Losartan due to ARF  continue with Atenolol and norvasc  consider decreasing IVF rate if blood pressure remains unstable.

## 2021-10-25 NOTE — CONSULT NOTE ADULT - SUBJECTIVE AND OBJECTIVE BOX
Patient is a 61y old  Male who presents with a chief complaint of Right flank pain, Right infected ureteral stone (24 Oct 2021 09:37)      INTERVAL HPI/ OVERNIGHT EVENTS: Pt was seen and examined at bedside today, No significant overnight events, pt continues to have mild dysuria and right flank pain      MEDICATIONS  (STANDING):  amLODIPine   Tablet 10 milliGRAM(s) Oral daily  aspirin enteric coated 81 milliGRAM(s) Oral daily  ATENolol  Tablet 50 milliGRAM(s) Oral daily  atorvastatin 20 milliGRAM(s) Oral at bedtime  dextrose 40% Gel 15 Gram(s) Oral once  dextrose 5%. 1000 milliLiter(s) (50 mL/Hr) IV Continuous <Continuous>  dextrose 5%. 1000 milliLiter(s) (100 mL/Hr) IV Continuous <Continuous>  dextrose 50% Injectable 25 Gram(s) IV Push once  dextrose 50% Injectable 12.5 Gram(s) IV Push once  dextrose 50% Injectable 25 Gram(s) IV Push once  doxazosin 8 milliGRAM(s) Oral at bedtime  febuxostat 40 milliGRAM(s) Oral every 24 hours  ferrous    sulfate 325 milliGRAM(s) Oral two times a day  glucagon  Injectable 1 milliGRAM(s) IntraMuscular once  heparin   Injectable 5000 Unit(s) SubCutaneous every 8 hours  insulin lispro (ADMELOG) corrective regimen sliding scale   SubCutaneous three times a day before meals  insulin lispro (ADMELOG) corrective regimen sliding scale   SubCutaneous at bedtime  levoFLOXacin IVPB 500 milliGRAM(s) IV Intermittent every 24 hours  melatonin 3 milliGRAM(s) Oral at bedtime  pantoprazole  Injectable 40 milliGRAM(s) IV Push daily  senna 2 Tablet(s) Oral at bedtime  sodium chloride 0.9%. 1000 milliLiter(s) (140 mL/Hr) IV Continuous <Continuous>    MEDICATIONS  (PRN):  acetaminophen     Tablet .. 650 milliGRAM(s) Oral every 6 hours PRN Temp greater or equal to 38C (100.4F), Mild Pain (1 - 3)  ondansetron   Disintegrating Tablet 4 milliGRAM(s) Oral every 6 hours PRN Nausea and/or Vomiting  oxyCODONE    IR 5 milliGRAM(s) Oral every 4 hours PRN Moderate Pain (4 - 6)      Allergies    penicillin (Chills)    Intolerances        REVIEW OF SYSTEMS:    Unable to examine due to [ ] Encephalopathy [ ] Advanced Dementia [ ] Expressive Aphasia [ ] Non-verbal patient    CONSTITUTIONAL: No fever, NO generalized weakness/Fatigue, No weight loss  EYES: No eye pain, visual disturbances, or discharge  ENMT:  No difficulty hearing, tinnitus, vertigo; No sinus or throat pain  NECK: No pain or stiffness  RESPIRATORY: No shortness of breath,  cough, wheezing, sputum or hemoptysis   CARDIOVASCULAR: no chest pain, no palpitations, or leg swelling  GASTROINTESTINAL: No abdominal pain. No nausea, vomiting, diarrhea or constipation. No melena or hematochezia.  GENITOURINARY: positive dysuria, no frequency, hematuria, or incontinence  NEUROLOGICAL: No headaches, Dizziness, memory loss, loss of strength, numbness, or tremors  SKIN: No itching, burning, rashes, or lesions   MUSCULOSKELETAL: Right flank pain,  No joint pain or swelling; No muscle, back, or extremity pain  PSYCHIATRIC: No depression, anxiety, mood swings, or difficulty sleeping  HEME/LYMPH: No easy bruising, or bleeding gums    Vital Signs Last 24 Hrs  T(C): 36.8 (25 Oct 2021 06:46), Max: 37.4 (24 Oct 2021 16:59)  T(F): 98.2 (25 Oct 2021 06:46), Max: 99.4 (24 Oct 2021 16:59)  HR: 82 (25 Oct 2021 06:46) (72 - 86)  BP: 154/82 (25 Oct 2021 06:46) (123/79 - 163/78)  BP(mean): --  RR: 18 (25 Oct 2021 06:46) (17 - 18)  SpO2: 98% (25 Oct 2021 06:46) (98% - 98%)    PHYSICAL EXAM:  GENERAL: NAD on RA, Obese   HEAD:  Atraumatic, Normocephalic  EYES: conjunctiva and sclera clear  ENMT: Moist mucous membranes  NECK: Supple, No JVD, Normal thyroid  CHEST/LUNG: Clear to Auscultation bilaterally; No rales, rhonchi, wheezing, or rubs  HEART: Regular rate and rhythm; No murmurs, rubs, or gallops  ABDOMEN: Soft, Nontender, Nondistended; Bowel sounds present  EXTREMITIES:   2+ Peripheral Pulses, No clubbing, cyanosis, or edema  SKIN: No rashes or lesions  NERVOUS SYSTEM:  Alert & Oriented X3, Good concentration; Motor Strength 5/5 B/L upper and lower extremities    LABS:                        8.5    15.28 )-----------( 231      ( 24 Oct 2021 07:01 )             28.4     10-24    140  |  111<H>  |  17  ----------------------------<  138<H>  3.8   |  23  |  1.74<H>    Ca    8.3<L>      24 Oct 2021 07:01    TPro  6.5  /  Alb  2.3<L>  /  TBili  0.4  /  DBili  x   /  AST  14<L>  /  ALT  13  /  AlkPhos  69  10-24        CAPILLARY BLOOD GLUCOSE      POCT Blood Glucose.: 149 mg/dL (25 Oct 2021 07:43)  POCT Blood Glucose.: 163 mg/dL (24 Oct 2021 20:53)  POCT Blood Glucose.: 142 mg/dL (24 Oct 2021 15:33)  POCT Blood Glucose.: 162 mg/dL (24 Oct 2021 11:21)        Culture - Blood (collected 10-23-21)  Source: .Blood Blood-Peripheral  Gram Stain (prelim) (10-24-21):    Growth in aerobic bottle: Gram Positive Cocci in Clusters    Growth in anaerobic bottle: Gram Positive Cocci in Clusters  Preliminary Report (10-24-21):    Growth in aerobic bottle: Gram Positive Cocci in Clusters    Growth in anaerobic bottle: Gram Positive Cocci in Clusters    Culture - Blood (collected 10-23-21)  Source: .Blood Blood-Peripheral  Gram Stain (prelim) (10-24-21):    Growth in aerobic bottle: Gram Positive Cocci in Clusters    Growth in anaerobic bottle: Gram Positive Cocci in Clusters  Preliminary Report (10-25-21):    Growth in aerobic bottle: Staphylococcus epidermidis "Susceptibilities    not performed"    Growth in anaerobic bottle: Gram Positive Cocci in Clusters    ***Blood Panel PCR results on this specimen are available    approximately 3 hours after the Gram stain result.***    Gram stain, PCR, and/or culture results may not always    correspond due to difference in methodologies.    ************************************************************    This PCR assay was performed by multiplex PCR. This    Assay tests for 66 bacterial and resistance gene targets.    Please refer to the [facility] test directory    at [email or web address] for details.  Organism: Blood Culture PCR (10-24-21)  Organism: Blood Culture PCR (10-24-21)    Sensitivities:      -  Staphylococcus epidermidis: Detec      Method Type: PCR        RADIOLOGY & ADDITIONAL TESTS:          Imaging Personally Reviewed:  [ ] YES  [ ] NO    Consultant(s) Notes Reviewed:  [ ] YES  [ ] NO    Care Discussed with Consultants/Other Providers [x ] YES  [ ] NO
Patient is a 61y old  Male who presents with a chief complaint of Right flank pain, Right infected ureteral stone (23 Oct 2021 06:32)      INTERVAL HPI/ OVERNIGHT EVENTS: Pt was seen and examined at bedside today, Pt admits to Right flank pain, denies any Fever, N/V/D or dysuria, pt c/o burning chest pain, he denies any SOB, VILLA, decrease ET, palpitations or LE edema     MEDICATIONS  (STANDING):  ATENolol  Tablet 50 milliGRAM(s) Oral daily  atorvastatin 20 milliGRAM(s) Oral at bedtime  dextrose 40% Gel 15 Gram(s) Oral once  dextrose 5%. 1000 milliLiter(s) (50 mL/Hr) IV Continuous <Continuous>  dextrose 5%. 1000 milliLiter(s) (100 mL/Hr) IV Continuous <Continuous>  dextrose 50% Injectable 25 Gram(s) IV Push once  dextrose 50% Injectable 12.5 Gram(s) IV Push once  dextrose 50% Injectable 25 Gram(s) IV Push once  glucagon  Injectable 1 milliGRAM(s) IntraMuscular once  insulin lispro (ADMELOG) corrective regimen sliding scale   SubCutaneous three times a day before meals  insulin lispro (ADMELOG) corrective regimen sliding scale   SubCutaneous at bedtime  lactated ringers. 1000 milliLiter(s) (110 mL/Hr) IV Continuous <Continuous>  levoFLOXacin IVPB 500 milliGRAM(s) IV Intermittent every 24 hours  losartan 100 milliGRAM(s) Oral daily  pantoprazole  Injectable 40 milliGRAM(s) IV Push daily    MEDICATIONS  (PRN):  acetaminophen     Tablet .. 650 milliGRAM(s) Oral every 6 hours PRN Temp greater or equal to 38C (100.4F), Mild Pain (1 - 3)      Allergies    penicillin (Chills)    Intolerances        REVIEW OF SYSTEMS:    Unable to examine due to [ ] Encephalopathy [ ] Advanced Dementia [ ] Expressive Aphasia [ ] Non-verbal patient    CONSTITUTIONAL: No fever, NO generalized weakness/Fatigue, No weight loss  EYES: No eye pain, visual disturbances, or discharge  ENMT:  No difficulty hearing, tinnitus, vertigo; No sinus or throat pain  NECK: No pain or stiffness  RESPIRATORY: No shortness of breath,  cough, wheezing, sputum or hemoptysis   CARDIOVASCULAR: +burning chest pain, no palpitations, or leg swelling  GASTROINTESTINAL: No abdominal pain. No nausea, vomiting, diarrhea or constipation. No melena or hematochezia.  GENITOURINARY: No dysuria, frequency, hematuria, or incontinence  NEUROLOGICAL: No headaches, Dizziness, memory loss, loss of strength, numbness, or tremors  SKIN: No itching, burning, rashes, or lesions   MUSCULOSKELETAL: Right flank pain,  No joint pain or swelling; No muscle, back, or extremity pain  PSYCHIATRIC: No depression, anxiety, mood swings, or difficulty sleeping  HEME/LYMPH: No easy bruising, or bleeding gums      Vital Signs Last 24 Hrs  T(C): 37.4 (23 Oct 2021 08:13), Max: 38.9 (23 Oct 2021 04:17)  T(F): 99.4 (23 Oct 2021 08:13), Max: 102.1 (23 Oct 2021 04:17)  HR: 67 (23 Oct 2021 08:13) (67 - 123)  BP: 137/92 (23 Oct 2021 08:13) (137/92 - 147/75)  BP(mean): --  RR: 16 (23 Oct 2021 08:13) (16 - 20)  SpO2: 99% (23 Oct 2021 08:13) (97% - 99%)    PHYSICAL EXAM:  GENERAL: NAD, Obese   HEAD:  Atraumatic, Normocephalic  EYES: conjunctiva and sclera clear  ENMT: Moist mucous membranes  NECK: Supple, No JVD, Normal thyroid  CHEST/LUNG: Clear to Auscultation bilaterally; No rales, rhonchi, wheezing, or rubs  HEART: Regular rate and rhythm; No murmurs, rubs, or gallops  ABDOMEN: Soft, Nontender, Nondistended; Bowel sounds present  EXTREMITIES:  +reproducible chest pain, 2+ Peripheral Pulses, No clubbing, cyanosis, or edema  SKIN: No rashes or lesions  NERVOUS SYSTEM:  Alert & Oriented X3, Good concentration; Motor Strength 5/5 B/L upper and lower extremities    LABS:                        10.0   9.67  )-----------( 231      ( 23 Oct 2021 05:03 )             31.8     10    135  |  106  |  16  ----------------------------<  165<H>  4.9   |  21<L>  |  1.84<H>    Ca    9.1      23 Oct 2021 05:03    TPro  7.7  /  Alb  3.1<L>  /  TBili  0.7  /  DBili  x   /  AST  27  /  ALT  18  /  AlkPhos  78  10-23    PT/INR - ( 23 Oct 2021 05:03 )   PT: 12.3 sec;   INR: 1.06 ratio         PTT - ( 23 Oct 2021 05:03 )  PTT:23.9 sec  Urinalysis Basic - ( 23 Oct 2021 06:01 )    Color: Yellow / Appearance: Clear / S.010 / pH: x  Gluc: x / Ketone: Small  / Bili: Negative / Urobili: Negative mg/dL   Blood: x / Protein: 15 mg/dL / Nitrite: Positive   Leuk Esterase: Trace / RBC: 0-2 /HPF / WBC 3-5   Sq Epi: x / Non Sq Epi: Occasional / Bacteria: Occasional      CAPILLARY BLOOD GLUCOSE      POCT Blood Glucose.: 168 mg/dL (23 Oct 2021 07:29)          RADIOLOGY & ADDITIONAL TESTS:    < from: CT Renal Stone Hunt (10.23.21 @ 05:05) >  IMPRESSION:  Persistent mild right hydroureteronephrosis with slightly decreased perinephric stranding. Previously seen right UPJ stone is somewhat more proximal within the ureter. No new ureteral calculus. Please correlate clinically with urinalysis and urine culture to exclude superimposed infection.    < end of copied text >        Imaging Personally Reviewed:  [ ] YES  [ ] NO    Consultant(s) Notes Reviewed:  [ ] YES  [ ] NO    Care Discussed with Consultants/Other Providers [x ] YES  [ ] NO
Patient is a 61y old  Male who presents with a chief complaint of Right flank pain, Right infected ureteral stone (24 Oct 2021 07:55)      INTERVAL HPI/ OVERNIGHT EVENTS: Pt was seen and examined at bedside today, No significant overnight events, pt admits to passing stones in his urine since last night, he admits that right flank pain has improved, denies any other complaints      MEDICATIONS  (STANDING):  amLODIPine   Tablet 10 milliGRAM(s) Oral daily  aspirin enteric coated 81 milliGRAM(s) Oral daily  ATENolol  Tablet 50 milliGRAM(s) Oral daily  atorvastatin 20 milliGRAM(s) Oral at bedtime  dextrose 40% Gel 15 Gram(s) Oral once  dextrose 5%. 1000 milliLiter(s) (50 mL/Hr) IV Continuous <Continuous>  dextrose 5%. 1000 milliLiter(s) (100 mL/Hr) IV Continuous <Continuous>  dextrose 50% Injectable 25 Gram(s) IV Push once  dextrose 50% Injectable 12.5 Gram(s) IV Push once  dextrose 50% Injectable 25 Gram(s) IV Push once  doxazosin 8 milliGRAM(s) Oral at bedtime  febuxostat 40 milliGRAM(s) Oral every 24 hours  glucagon  Injectable 1 milliGRAM(s) IntraMuscular once  insulin lispro (ADMELOG) corrective regimen sliding scale   SubCutaneous three times a day before meals  insulin lispro (ADMELOG) corrective regimen sliding scale   SubCutaneous at bedtime  levoFLOXacin IVPB 500 milliGRAM(s) IV Intermittent every 24 hours  melatonin 3 milliGRAM(s) Oral at bedtime  pantoprazole  Injectable 40 milliGRAM(s) IV Push daily  senna 2 Tablet(s) Oral at bedtime  sodium bicarbonate 650 milliGRAM(s) Oral two times a day  sodium chloride 0.9%. 1000 milliLiter(s) (140 mL/Hr) IV Continuous <Continuous>    MEDICATIONS  (PRN):  acetaminophen     Tablet .. 650 milliGRAM(s) Oral every 6 hours PRN Temp greater or equal to 38C (100.4F), Mild Pain (1 - 3)  oxyCODONE    IR 5 milliGRAM(s) Oral every 4 hours PRN Moderate Pain (4 - 6)      Allergies    penicillin (Chills)    Intolerances        REVIEW OF SYSTEMS:    Unable to examine due to [ ] Encephalopathy [ ] Advanced Dementia [ ] Expressive Aphasia [ ] Non-verbal patient    CONSTITUTIONAL: No fever, NO generalized weakness/Fatigue, No weight loss  EYES: No eye pain, visual disturbances, or discharge  ENMT:  No difficulty hearing, tinnitus, vertigo; No sinus or throat pain  NECK: No pain or stiffness  RESPIRATORY: No shortness of breath,  cough, wheezing, sputum or hemoptysis   CARDIOVASCULAR: no chest pain, no palpitations, or leg swelling  GASTROINTESTINAL: No abdominal pain. No nausea, vomiting, diarrhea or constipation. No melena or hematochezia.  GENITOURINARY: No dysuria, frequency, hematuria, or incontinence  NEUROLOGICAL: No headaches, Dizziness, memory loss, loss of strength, numbness, or tremors  SKIN: No itching, burning, rashes, or lesions   MUSCULOSKELETAL: Right flank pain,  No joint pain or swelling; No muscle, back, or extremity pain  PSYCHIATRIC: No depression, anxiety, mood swings, or difficulty sleeping  HEME/LYMPH: No easy bruising, or bleeding gums        Vital Signs Last 24 Hrs  T(C): 36.7 (24 Oct 2021 08:31), Max: 37.2 (24 Oct 2021 00:16)  T(F): 98 (24 Oct 2021 08:31), Max: 98.9 (24 Oct 2021 00:16)  HR: 68 (24 Oct 2021 08:31) (67 - 90)  BP: 144/75 (24 Oct 2021 08:31) (110/71 - 145/74)  BP(mean): --  RR: 16 (24 Oct 2021 08:31) (16 - 18)  SpO2: 95% (24 Oct 2021 08:31) (95% - 98%)    PHYSICAL EXAM:  GENERAL: NAD on RA, Obese   HEAD:  Atraumatic, Normocephalic  EYES: conjunctiva and sclera clear  ENMT: Moist mucous membranes  NECK: Supple, No JVD, Normal thyroid  CHEST/LUNG: Clear to Auscultation bilaterally; No rales, rhonchi, wheezing, or rubs  HEART: Regular rate and rhythm; No murmurs, rubs, or gallops  ABDOMEN: Soft, Nontender, Nondistended; Bowel sounds present  EXTREMITIES:  Right flank tenderness, 2+ Peripheral Pulses, No clubbing, cyanosis, or edema  SKIN: No rashes or lesions  NERVOUS SYSTEM:  Alert & Oriented X3, Good concentration; Motor Strength 5/5 B/L upper and lower extremities      LABS:                        8.5    15.28 )-----------( 231      ( 24 Oct 2021 07:01 )             28.4     10    140  |  111<H>  |  17  ----------------------------<  138<H>  3.8   |  23  |  1.74<H>    Ca    8.3<L>      24 Oct 2021 07:01  Phos  2.0     10-23  Mg     1.8     10-23    TPro  6.5  /  Alb  2.3<L>  /  TBili  0.4  /  DBili  x   /  AST  14<L>  /  ALT  13  /  AlkPhos  69  10-24    PT/INR - ( 23 Oct 2021 05:03 )   PT: 12.3 sec;   INR: 1.06 ratio         PTT - ( 23 Oct 2021 05:03 )  PTT:23.9 sec  Urinalysis Basic - ( 23 Oct 2021 06:01 )    Color: Yellow / Appearance: Clear / S.010 / pH: x  Gluc: x / Ketone: Small  / Bili: Negative / Urobili: Negative mg/dL   Blood: x / Protein: 15 mg/dL / Nitrite: Positive   Leuk Esterase: Trace / RBC: 0-2 /HPF / WBC 3-5   Sq Epi: x / Non Sq Epi: Occasional / Bacteria: Occasional      CAPILLARY BLOOD GLUCOSE      POCT Blood Glucose.: 144 mg/dL (24 Oct 2021 07:48)  POCT Blood Glucose.: 134 mg/dL (23 Oct 2021 20:50)  POCT Blood Glucose.: 183 mg/dL (23 Oct 2021 16:23)  POCT Blood Glucose.: 141 mg/dL (23 Oct 2021 11:10)        Culture - Blood (collected 10-23-21)  Source: .Blood Blood-Peripheral  Gram Stain (prelim) (10-24-21):    Growth in aerobic bottle: Gram Positive Cocci in Clusters  Preliminary Report (10-24-21):    Growth in aerobic bottle: Gram Positive Cocci in Clusters    Culture - Blood (collected 10-23-21)  Source: .Blood Blood-Peripheral  Gram Stain (prelim) (10-24-21):    Growth in aerobic bottle: Gram Positive Cocci in Clusters  Preliminary Report (10-24-21):    Growth in aerobic bottle: Gram Positive Cocci in Clusters    ***Blood Panel PCR results on this specimen are available    approximately 3 hours after the Gram stain result.***    Gram stain, PCR, and/or culture results may not always    correspond due to difference in methodologies.    ************************************************************    This PCR assay was performed by multiplex PCR. This    Assay tests for 66 bacterial and resistance gene targets.    Please refer to the [facility] test directory    at [email or web address] for details.        RADIOLOGY & ADDITIONAL TESTS:          Imaging Personally Reviewed:  [ ] YES  [ ] NO    Consultant(s) Notes Reviewed:  [ ] YES  [ ] NO    Care Discussed with Consultants/Other Providers [x ] YES  [ ] NO

## 2021-10-25 NOTE — CONSULT NOTE ADULT - PROBLEM SELECTOR RECOMMENDATION 4
Hold home oral hypoglycemic medications.   cont w/ FS monitoring w/ insulin s/s coverage   pt may resume home regimen on discharge
improved w/ IVF  cont to monitor renal function and avoid nephrotoxic medications   cont to hold Losartan until renal function recovers
improving w/ IVF  cont to monitor renal function and avoid nephrotoxic medications   cont to hold Losartan until renal function recovers

## 2021-10-25 NOTE — DISCHARGE NOTE PROVIDER - NSDCCPCAREPLAN_GEN_ALL_CORE_FT
PRINCIPAL DISCHARGE DIAGNOSIS  Diagnosis: Sepsis, unspecified organism  Assessment and Plan of Treatment:       SECONDARY DISCHARGE DIAGNOSES  Diagnosis: Kidney stone  Assessment and Plan of Treatment:

## 2021-10-25 NOTE — PROVIDER CONTACT NOTE (CRITICAL VALUE NOTIFICATION) - TEST AND RESULT REPORTED:
Blood Culture 10/23/21 prelim report. Aerobic Growth Gram + cocci in clusters
blood culture collected on 10/24. Preliminary results show gram (+) cocci in  clusters in aerobic bottle

## 2021-10-26 LAB
-  AMPICILLIN/SULBACTAM: SIGNIFICANT CHANGE UP
-  CEFAZOLIN: SIGNIFICANT CHANGE UP
-  CLINDAMYCIN: SIGNIFICANT CHANGE UP
-  ERYTHROMYCIN: SIGNIFICANT CHANGE UP
-  GENTAMICIN: SIGNIFICANT CHANGE UP
-  OXACILLIN: SIGNIFICANT CHANGE UP
-  PENICILLIN: SIGNIFICANT CHANGE UP
-  RIFAMPIN: SIGNIFICANT CHANGE UP
-  TETRACYCLINE: SIGNIFICANT CHANGE UP
-  TRIMETHOPRIM/SULFAMETHOXAZOLE: SIGNIFICANT CHANGE UP
-  VANCOMYCIN: SIGNIFICANT CHANGE UP
CULTURE RESULTS: SIGNIFICANT CHANGE UP
GRAM STN FLD: SIGNIFICANT CHANGE UP
METHOD TYPE: SIGNIFICANT CHANGE UP
ORGANISM # SPEC MICROSCOPIC CNT: SIGNIFICANT CHANGE UP
SPECIMEN SOURCE: SIGNIFICANT CHANGE UP

## 2021-10-27 LAB
-  AMPICILLIN/SULBACTAM: SIGNIFICANT CHANGE UP
-  CEFAZOLIN: SIGNIFICANT CHANGE UP
-  CLINDAMYCIN: SIGNIFICANT CHANGE UP
-  ERYTHROMYCIN: SIGNIFICANT CHANGE UP
-  GENTAMICIN: SIGNIFICANT CHANGE UP
-  OXACILLIN: SIGNIFICANT CHANGE UP
-  PENICILLIN: SIGNIFICANT CHANGE UP
-  RIFAMPIN: SIGNIFICANT CHANGE UP
-  TETRACYCLINE: SIGNIFICANT CHANGE UP
-  TRIMETHOPRIM/SULFAMETHOXAZOLE: SIGNIFICANT CHANGE UP
-  VANCOMYCIN: SIGNIFICANT CHANGE UP
CULTURE RESULTS: SIGNIFICANT CHANGE UP
CULTURE RESULTS: SIGNIFICANT CHANGE UP
GRAM STN FLD: SIGNIFICANT CHANGE UP
GRAM STN FLD: SIGNIFICANT CHANGE UP
METHOD TYPE: SIGNIFICANT CHANGE UP
ORGANISM # SPEC MICROSCOPIC CNT: SIGNIFICANT CHANGE UP
ORGANISM # SPEC MICROSCOPIC CNT: SIGNIFICANT CHANGE UP
SPECIMEN SOURCE: SIGNIFICANT CHANGE UP

## 2021-10-28 LAB — CULTURE RESULTS: SIGNIFICANT CHANGE UP

## 2021-10-30 DIAGNOSIS — A41.9 SEPSIS, UNSPECIFIED ORGANISM: ICD-10-CM

## 2021-10-30 DIAGNOSIS — R07.89 OTHER CHEST PAIN: ICD-10-CM

## 2021-10-30 DIAGNOSIS — N40.0 BENIGN PROSTATIC HYPERPLASIA WITHOUT LOWER URINARY TRACT SYMPTOMS: ICD-10-CM

## 2021-10-30 DIAGNOSIS — Z79.84 LONG TERM (CURRENT) USE OF ORAL HYPOGLYCEMIC DRUGS: ICD-10-CM

## 2021-10-30 DIAGNOSIS — Z79.82 LONG TERM (CURRENT) USE OF ASPIRIN: ICD-10-CM

## 2021-10-30 DIAGNOSIS — Z53.29 PROCEDURE AND TREATMENT NOT CARRIED OUT BECAUSE OF PATIENT'S DECISION FOR OTHER REASONS: ICD-10-CM

## 2021-10-30 DIAGNOSIS — N13.6 PYONEPHROSIS: ICD-10-CM

## 2021-10-30 DIAGNOSIS — I10 ESSENTIAL (PRIMARY) HYPERTENSION: ICD-10-CM

## 2021-10-30 DIAGNOSIS — D50.9 IRON DEFICIENCY ANEMIA, UNSPECIFIED: ICD-10-CM

## 2021-10-30 DIAGNOSIS — Z79.899 OTHER LONG TERM (CURRENT) DRUG THERAPY: ICD-10-CM

## 2021-10-30 DIAGNOSIS — N17.9 ACUTE KIDNEY FAILURE, UNSPECIFIED: ICD-10-CM

## 2021-10-30 DIAGNOSIS — Z79.02 LONG TERM (CURRENT) USE OF ANTITHROMBOTICS/ANTIPLATELETS: ICD-10-CM

## 2021-10-30 DIAGNOSIS — B96.89 OTHER SPECIFIED BACTERIAL AGENTS AS THE CAUSE OF DISEASES CLASSIFIED ELSEWHERE: ICD-10-CM

## 2021-10-30 DIAGNOSIS — Z88.0 ALLERGY STATUS TO PENICILLIN: ICD-10-CM

## 2021-10-30 DIAGNOSIS — E11.9 TYPE 2 DIABETES MELLITUS WITHOUT COMPLICATIONS: ICD-10-CM

## 2021-10-30 DIAGNOSIS — Z86.73 PERSONAL HISTORY OF TRANSIENT ISCHEMIC ATTACK (TIA), AND CEREBRAL INFARCTION WITHOUT RESIDUAL DEFICITS: ICD-10-CM

## 2021-11-02 PROBLEM — Z00.00 ENCOUNTER FOR PREVENTIVE HEALTH EXAMINATION: Status: ACTIVE | Noted: 2021-11-02

## 2021-11-04 ENCOUNTER — APPOINTMENT (OUTPATIENT)
Dept: UROLOGY | Facility: CLINIC | Age: 61
End: 2021-11-04

## 2022-03-11 NOTE — ED ADULT NURSE NOTE - IN THE PAST 12 MONTHS HAVE YOU USED DRUGS OTHER THAN THOSE REQUIRED FOR MEDICAL REASON?
Dr Yaritza Segal  RE: refill     Patient requesting refill of:     naproxen (NAPROSYN) 500 mg tablet [[id number]]   Order Details   Dose: 500 mg Route: Oral Frequency: 2 times daily PRN for moderate pain   Dispense Quantity: 60 tablet Refills: 1         Sig: Take 1 tablet (500 mg total) by mouth 2 (two) times a day as needed for moderate pain        Start Date: 11/10/20 End Date: --   Written Date: 11/10/20 Expiration Date: 11/10/21     Pharmacy    [facility] [address] [place] [address] [address] [address]   Phone:  [phone]  Fax:  [phone]
Pateint checking on status  Advised it was filled on 3/9  Patient satisfied 
No

## 2022-10-07 NOTE — H&P ADULT - NEUROLOGICAL
There is a small soft area inferior to the angle of the jaw on the right. You can consider Atrovent nasal spray for your postnasal drainage should it become more bothersome. You have an incidental septal deviation. Your ears were fully cleaned of cerumen. negative

## 2022-11-04 NOTE — DISCHARGE NOTE NURSING/CASE MANAGEMENT/SOCIAL WORK - NSDPLANG ASIS_GEN_ALL_CORE
CC:  Victor Hugo Salgado is here today for mcw  Medications: medications verified, no change  Refills needed today?  YES  denies Latex allergy or sensitivity  Patient would like communication of their results via:      Cell Phone:   Telephone Information:   Mobile [phone]     Okay to leave a message containing results? Yes  Tobacco history: verified      Patient   Health Maintenance Due   Topic Date Due   • Hepatitis B Vaccine (For Physician/APC Discussion) (1 of 3 - 3-dose series) Never done   • DTaP/Tdap/Td Vaccine (1 - Tdap) 04/20/2010   • Medicare Advantage- Medicare Wellness Visit  01/01/2022       Patient is due for the topics as listed above and wishes to proceed with them.       Unaddressed Risk Adjusted HCC Categories and Diagnoses   - Chronic Kidney Disease, Moderate (Stage 3)  - Unaddressed Dx:Stage 3 Chronic Kidney Disease, Unspecified Whether Stage 3a Or 3b Ckd (Cms/Hcc)          Unaddressed Risk Adjusted HCC Categories and Diagnoses   - Chronic Kidney Disease, Moderate (Stage 3)  - Unaddressed Dx:Stage 3 Chronic Kidney Disease, Unspecified Whether Stage 3a Or 3b Ckd (Cms/Hcc)                     No

## 2023-05-15 NOTE — ED PROVIDER NOTE - PRO INTERPRETER NEED 2
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      Patient reported coughing for past 1 month productive of grey phelgm with occasional difficulty expectorating respiratory secretions. Patient reported progressive shortness of breath to the point  where he is not even able to stand for few minutes and do dishes or some cooking. He has stopped going out as he is scared of worsening. He has been seeing dr or for  Post discharge  from December for micrococcus luteus bacteremia, pasturella pneumonia. In 1/30 Dr Ro  noted penicillium fungus in fungal culture, has been on itraconazole for 3-6 months. Patient had follow up appointments with ID, pulmonology but couldn't make it as he is so short of breath and he is not able to corordinate with [place] state cab system.      He denied fever or chills, no sick contacts, no diarrhea, vomiting. He is complaint with all his medications, nebulization, oxygen. Quit smoking 5/10/2022      Patient noted to be in distress, low-grade temperature 99.1°, hemodynamically stable, oxygen saturations 92, requiring 3 L oxygen through nasal cannula.  WBC 10.5, procalcitonin negative, lactic acid 0.9 chest x-ray with emphysematous changes and bibasilar opacities .  Patient was given DuoNebs, 125 mg IV methylprednisolone, pulmonology consulted and patient admitted for further care.    Care Transitions Assessment    Utilization:  Visit Hospital Last 30 Days: Unplanned inpatient admission   Perception of Change in Health Status D/C:  Improving  Discharged To: Home with home health  Discharge Instructions: Understands d/c instructions    Medications:  IP/Amb Med List Reviewed with Patient: Yes  Med Prescriptions Filled After D/C: Rx not provided at d/c (Pt states he does not have the guaifenesin and is going to talk with his  about it on 5/18/23.)    Follow-up Care:  Appointments: TCM  Provider Appt Scheduled Prior to D/C: Yes  Provider Appt Date: 05/18/23  Type of Provider: PCP     Appt Scheduled Prior to D/C: Yes  Specialist Appt Date: 06/02/23  Type of Specialist: Infectious Disease    Follow-up Appt Status: Confirms scheduled appt    Skilled Services: Yes  Skilled Services Options: Nursing; Occupational therapy; Physical therapy    Equipment/DME:   DME Type: Glucometer; Nebulizer; Oxygen; Pulse Oximeter; Wheelchair; Walker; Cane; Shower Chair  Oxygen Vendor: [facility]  Oxygen Comments: Continuous  Patients reported confidence in appropriate use of DME? Confident    Review of Systems:   Care Transition System Evaluation:    Fever: is not present   Pain:  5  Pain Location: Chest due to cough/congestion  General Symptoms: Dizziness; Fatigue (Dizzi when getting up)  Neurologic/Neuromuscular Symptoms: Weakness; Difficulty walking; Fatigue  ENT Symptoms: Congestion  Respiratory Symptoms: Shortness of breath; Cough  Shortness of Breath: SOB with mild exertion    Cardiovascular Symptoms: Edema; Fatigue  GI Symptoms: WDL (absence of symptoms)       Symptoms: WDL (absence of symptoms)  Skin Symptoms: WDL (absence of symptoms)     Hours of Uninterrupted Sleep:3  Sleeping Behaviors:Restless sleeper  Current Lines/Drains:No    Activities of Daily Living (global): Self-care    Patient states that he does have sufficient family support. He feels that he is able to ask for assistance when needed.     Writer spoke with patient whom states he is doing ok at home.  States that although he continues to struggle with his breathing and shortness  of breath, it seems as if it has returned to patient's baseline.  Patient is using 3 Lpm of oxygen via nasal cannula. Patient has a meeting with the [facility] tomorrow, and he is hopefull that they will be able to provide him with assistance especially with transportation needs.  At this time he uses a city/state provided wheelchair van and they can be unpredictable.  His wife and daughter are sometimes able to assist as well. Patient states some concerns with his past hospital stay that he is going to talk with his PCP about.  Also states that he did not receive his guaifenesin upon discharge.  Patient states overall stressors and frustrations with his health in general of loosing his independence and having to rely on others.  Writer plans to continue with 1x per week TC-RN check-ins for 4 weeks.      English

## 2024-08-07 NOTE — DISCHARGE NOTE NURSING/CASE MANAGEMENT/SOCIAL WORK - NSDPDISTO_GEN_ALL_CORE
[Normal] : oriented to person, place and time, the affect was normal, the mood was normal and not anxious [de-identified] : s/p left partial mastectomy Home

## 2024-10-07 ENCOUNTER — INPATIENT (INPATIENT)
Facility: HOSPITAL | Age: 64
LOS: 0 days | Discharge: HOME | End: 2024-10-08
Attending: STUDENT IN AN ORGANIZED HEALTH CARE EDUCATION/TRAINING PROGRAM | Admitting: STUDENT IN AN ORGANIZED HEALTH CARE EDUCATION/TRAINING PROGRAM
Payer: MEDICARE

## 2024-10-07 VITALS
HEART RATE: 84 BPM | RESPIRATION RATE: 16 BRPM | SYSTOLIC BLOOD PRESSURE: 194 MMHG | OXYGEN SATURATION: 98 % | HEIGHT: 68 IN | DIASTOLIC BLOOD PRESSURE: 84 MMHG | WEIGHT: 210.1 LBS | TEMPERATURE: 99 F

## 2024-10-07 DIAGNOSIS — N20.1 CALCULUS OF URETER: ICD-10-CM

## 2024-10-07 DIAGNOSIS — E11.9 TYPE 2 DIABETES MELLITUS WITHOUT COMPLICATIONS: ICD-10-CM

## 2024-10-07 DIAGNOSIS — Z29.9 ENCOUNTER FOR PROPHYLACTIC MEASURES, UNSPECIFIED: ICD-10-CM

## 2024-10-07 DIAGNOSIS — Z98.890 OTHER SPECIFIED POSTPROCEDURAL STATES: Chronic | ICD-10-CM

## 2024-10-07 DIAGNOSIS — I10 ESSENTIAL (PRIMARY) HYPERTENSION: ICD-10-CM

## 2024-10-07 DIAGNOSIS — N40.0 BENIGN PROSTATIC HYPERPLASIA WITHOUT LOWER URINARY TRACT SYMPTOMS: ICD-10-CM

## 2024-10-07 DIAGNOSIS — N20.0 CALCULUS OF KIDNEY: ICD-10-CM

## 2024-10-07 LAB
ALBUMIN SERPL ELPH-MCNC: 3.9 G/DL — SIGNIFICANT CHANGE UP (ref 3.3–5)
ALP SERPL-CCNC: 96 U/L — SIGNIFICANT CHANGE UP (ref 40–120)
ALT FLD-CCNC: 31 U/L — SIGNIFICANT CHANGE UP (ref 4–41)
ANION GAP SERPL CALC-SCNC: 13 MMOL/L — SIGNIFICANT CHANGE UP (ref 7–14)
APPEARANCE UR: CLEAR — SIGNIFICANT CHANGE UP
AST SERPL-CCNC: 21 U/L — SIGNIFICANT CHANGE UP (ref 4–40)
BACTERIA # UR AUTO: NEGATIVE /HPF — SIGNIFICANT CHANGE UP
BASOPHILS # BLD AUTO: 0.04 K/UL — SIGNIFICANT CHANGE UP (ref 0–0.2)
BASOPHILS NFR BLD AUTO: 0.4 % — SIGNIFICANT CHANGE UP (ref 0–2)
BILIRUB SERPL-MCNC: 0.3 MG/DL — SIGNIFICANT CHANGE UP (ref 0.2–1.2)
BILIRUB UR-MCNC: NEGATIVE — SIGNIFICANT CHANGE UP
BUN SERPL-MCNC: 18 MG/DL — SIGNIFICANT CHANGE UP (ref 7–23)
CALCIUM SERPL-MCNC: 9.3 MG/DL — SIGNIFICANT CHANGE UP (ref 8.4–10.5)
CAST: 0 /LPF — SIGNIFICANT CHANGE UP (ref 0–4)
CHLORIDE SERPL-SCNC: 102 MMOL/L — SIGNIFICANT CHANGE UP (ref 98–107)
CO2 SERPL-SCNC: 21 MMOL/L — LOW (ref 22–31)
COLOR SPEC: YELLOW — SIGNIFICANT CHANGE UP
CREAT SERPL-MCNC: 1.9 MG/DL — HIGH (ref 0.5–1.3)
DIFF PNL FLD: ABNORMAL
EGFR: 39 ML/MIN/1.73M2 — LOW
EOSINOPHIL # BLD AUTO: 0.28 K/UL — SIGNIFICANT CHANGE UP (ref 0–0.5)
EOSINOPHIL NFR BLD AUTO: 2.5 % — SIGNIFICANT CHANGE UP (ref 0–6)
GLUCOSE BLDC GLUCOMTR-MCNC: 169 MG/DL — HIGH (ref 70–99)
GLUCOSE BLDC GLUCOMTR-MCNC: 255 MG/DL — HIGH (ref 70–99)
GLUCOSE SERPL-MCNC: 222 MG/DL — HIGH (ref 70–99)
GLUCOSE UR QL: 250 MG/DL
HCT VFR BLD CALC: 30.5 % — LOW (ref 39–50)
HGB BLD-MCNC: 9.4 G/DL — LOW (ref 13–17)
IANC: 8.57 K/UL — HIGH (ref 1.8–7.4)
IMM GRANULOCYTES NFR BLD AUTO: 0.8 % — SIGNIFICANT CHANGE UP (ref 0–0.9)
KETONES UR-MCNC: NEGATIVE MG/DL — SIGNIFICANT CHANGE UP
LEUKOCYTE ESTERASE UR-ACNC: ABNORMAL
LYMPHOCYTES # BLD AUTO: 1.3 K/UL — SIGNIFICANT CHANGE UP (ref 1–3.3)
LYMPHOCYTES # BLD AUTO: 11.4 % — LOW (ref 13–44)
MCHC RBC-ENTMCNC: 25.8 PG — LOW (ref 27–34)
MCHC RBC-ENTMCNC: 30.8 GM/DL — LOW (ref 32–36)
MCV RBC AUTO: 83.6 FL — SIGNIFICANT CHANGE UP (ref 80–100)
MONOCYTES # BLD AUTO: 1.12 K/UL — HIGH (ref 0–0.9)
MONOCYTES NFR BLD AUTO: 9.8 % — SIGNIFICANT CHANGE UP (ref 2–14)
NEUTROPHILS # BLD AUTO: 8.57 K/UL — HIGH (ref 1.8–7.4)
NEUTROPHILS NFR BLD AUTO: 75.1 % — SIGNIFICANT CHANGE UP (ref 43–77)
NITRITE UR-MCNC: NEGATIVE — SIGNIFICANT CHANGE UP
NRBC # BLD: 0 /100 WBCS — SIGNIFICANT CHANGE UP (ref 0–0)
NRBC # FLD: 0 K/UL — SIGNIFICANT CHANGE UP (ref 0–0)
PH UR: 7 — SIGNIFICANT CHANGE UP (ref 5–8)
PLATELET # BLD AUTO: 504 K/UL — HIGH (ref 150–400)
POTASSIUM SERPL-MCNC: 4.3 MMOL/L — SIGNIFICANT CHANGE UP (ref 3.5–5.3)
POTASSIUM SERPL-SCNC: 4.3 MMOL/L — SIGNIFICANT CHANGE UP (ref 3.5–5.3)
PROT SERPL-MCNC: 8.2 G/DL — SIGNIFICANT CHANGE UP (ref 6–8.3)
PROT UR-MCNC: 100 MG/DL
RBC # BLD: 3.65 M/UL — LOW (ref 4.2–5.8)
RBC # FLD: 14.4 % — SIGNIFICANT CHANGE UP (ref 10.3–14.5)
RBC CASTS # UR COMP ASSIST: 8 /HPF — HIGH (ref 0–4)
SODIUM SERPL-SCNC: 136 MMOL/L — SIGNIFICANT CHANGE UP (ref 135–145)
SP GR SPEC: 1.01 — SIGNIFICANT CHANGE UP (ref 1–1.03)
SQUAMOUS # UR AUTO: 0 /HPF — SIGNIFICANT CHANGE UP (ref 0–5)
UROBILINOGEN FLD QL: 1 MG/DL — SIGNIFICANT CHANGE UP (ref 0.2–1)
WBC # BLD: 11.4 K/UL — HIGH (ref 3.8–10.5)
WBC # FLD AUTO: 11.4 K/UL — HIGH (ref 3.8–10.5)
WBC UR QL: 124 /HPF — HIGH (ref 0–5)

## 2024-10-07 PROCEDURE — 74176 CT ABD & PELVIS W/O CONTRAST: CPT | Mod: 26,MC

## 2024-10-07 PROCEDURE — 99223 1ST HOSP IP/OBS HIGH 75: CPT

## 2024-10-07 PROCEDURE — 99285 EMERGENCY DEPT VISIT HI MDM: CPT | Mod: GC

## 2024-10-07 RX ORDER — INFLUENZA VIRUS VACCINE 15; 15; 15; 15 UG/.5ML; UG/.5ML; UG/.5ML; UG/.5ML
0.5 SUSPENSION INTRAMUSCULAR ONCE
Refills: 0 | Status: DISCONTINUED | OUTPATIENT
Start: 2024-10-07 | End: 2024-10-08

## 2024-10-07 RX ORDER — TAMSULOSIN HCL 0.4 MG
0.8 CAPSULE ORAL AT BEDTIME
Refills: 0 | Status: DISCONTINUED | OUTPATIENT
Start: 2024-10-07 | End: 2024-10-08

## 2024-10-07 RX ORDER — ALCOHOL ANTISEPTIC PADS
12.5 PADS, MEDICATED (EA) TOPICAL ONCE
Refills: 0 | Status: DISCONTINUED | OUTPATIENT
Start: 2024-10-07 | End: 2024-10-08

## 2024-10-07 RX ORDER — KETOROLAC TROMETHAMINE 10 MG/1
15 TABLET, FILM COATED ORAL ONCE
Refills: 0 | Status: DISCONTINUED | OUTPATIENT
Start: 2024-10-07 | End: 2024-10-07

## 2024-10-07 RX ORDER — ALCOHOL ANTISEPTIC PADS
15 PADS, MEDICATED (EA) TOPICAL ONCE
Refills: 0 | Status: DISCONTINUED | OUTPATIENT
Start: 2024-10-07 | End: 2024-10-08

## 2024-10-07 RX ORDER — ACETAMINOPHEN 325 MG
650 TABLET ORAL EVERY 6 HOURS
Refills: 0 | Status: DISCONTINUED | OUTPATIENT
Start: 2024-10-07 | End: 2024-10-08

## 2024-10-07 RX ORDER — INSULIN LISPRO 100/ML
VIAL (ML) SUBCUTANEOUS
Refills: 0 | Status: DISCONTINUED | OUTPATIENT
Start: 2024-10-07 | End: 2024-10-08

## 2024-10-07 RX ORDER — ALCOHOL ANTISEPTIC PADS
25 PADS, MEDICATED (EA) TOPICAL ONCE
Refills: 0 | Status: DISCONTINUED | OUTPATIENT
Start: 2024-10-07 | End: 2024-10-08

## 2024-10-07 RX ORDER — SODIUM CHLORIDE IRRIG SOLUTION 0.9 %
1000 SOLUTION, IRRIGATION IRRIGATION
Refills: 0 | Status: DISCONTINUED | OUTPATIENT
Start: 2024-10-07 | End: 2024-10-08

## 2024-10-07 RX ORDER — ATENOLOL 25 MG/1
50 TABLET ORAL DAILY
Refills: 0 | Status: DISCONTINUED | OUTPATIENT
Start: 2024-10-07 | End: 2024-10-08

## 2024-10-07 RX ORDER — AMLODIPINE BESYLATE 5 MG
5 TABLET ORAL
Refills: 0 | Status: DISCONTINUED | OUTPATIENT
Start: 2024-10-07 | End: 2024-10-08

## 2024-10-07 RX ORDER — SODIUM CHLORIDE 0.9 % (FLUSH) 0.9 %
1000 SYRINGE (ML) INJECTION ONCE
Refills: 0 | Status: COMPLETED | OUTPATIENT
Start: 2024-10-07 | End: 2024-10-07

## 2024-10-07 RX ORDER — HYDROMORPHONE HYDROCHLORIDE 1 MG/ML
0.5 INJECTION, SOLUTION INTRAMUSCULAR; INTRAVENOUS; SUBCUTANEOUS ONCE
Refills: 0 | Status: DISCONTINUED | OUTPATIENT
Start: 2024-10-07 | End: 2024-10-07

## 2024-10-07 RX ORDER — CEFTRIAXONE SODIUM 1 G
1000 VIAL (EA) INJECTION ONCE
Refills: 0 | Status: DISCONTINUED | OUTPATIENT
Start: 2024-10-07 | End: 2024-10-07

## 2024-10-07 RX ORDER — GLUCAGON INJECTION, SOLUTION 0.5 MG/.1ML
1 INJECTION, SOLUTION SUBCUTANEOUS ONCE
Refills: 0 | Status: DISCONTINUED | OUTPATIENT
Start: 2024-10-07 | End: 2024-10-08

## 2024-10-07 RX ORDER — HYDROMORPHONE HYDROCHLORIDE 1 MG/ML
1 INJECTION, SOLUTION INTRAMUSCULAR; INTRAVENOUS; SUBCUTANEOUS ONCE
Refills: 0 | Status: DISCONTINUED | OUTPATIENT
Start: 2024-10-07 | End: 2024-10-07

## 2024-10-07 RX ADMIN — Medication 5 MILLIGRAM(S): at 19:21

## 2024-10-07 RX ADMIN — Medication 0.8 MILLIGRAM(S): at 21:43

## 2024-10-07 RX ADMIN — HYDROMORPHONE HYDROCHLORIDE 1 MILLIGRAM(S): 1 INJECTION, SOLUTION INTRAMUSCULAR; INTRAVENOUS; SUBCUTANEOUS at 05:22

## 2024-10-07 RX ADMIN — KETOROLAC TROMETHAMINE 15 MILLIGRAM(S): 10 TABLET, FILM COATED ORAL at 05:22

## 2024-10-07 RX ADMIN — Medication 3: at 22:47

## 2024-10-07 RX ADMIN — Medication 200 MILLIGRAM(S): at 19:21

## 2024-10-07 RX ADMIN — HYDROMORPHONE HYDROCHLORIDE 0.5 MILLIGRAM(S): 1 INJECTION, SOLUTION INTRAMUSCULAR; INTRAVENOUS; SUBCUTANEOUS at 11:53

## 2024-10-07 RX ADMIN — Medication 650 MILLIGRAM(S): at 20:29

## 2024-10-07 RX ADMIN — Medication 1000 MILLILITER(S): at 05:23

## 2024-10-07 RX ADMIN — Medication 650 MILLIGRAM(S): at 21:29

## 2024-10-07 NOTE — ED ADULT NURSE REASSESSMENT NOTE - NS ED NURSE REASSESS COMMENT FT1
Received patient in stable condition. Breathing non-labored. Plan of care in progress.
Pt received from FLOAT RN on stretcher, A/Ox4 answers all questions appropriately. Pt denies chest pain and SOB during reassessment, breathing is even and unlabored on room air. Abdomen is soft and non-distended. Pt ambulates independently at baseline, moves all four extremities on command, skin is intact. Pt resting comfortably at the bedside, No apparent acute visible distress noted on reassessment. Vital signs stable, NKA to medications. Pending results

## 2024-10-07 NOTE — H&P ADULT - PROBLEM SELECTOR PROBLEM 3
T(C): 36.8 (08-11-23 @ 00:41), Max: 36.8 (08-10-23 @ 16:00)  HR: 67 (08-11-23 @ 01:51) (65 - 97)  BP: 185/91 (08-11-23 @ 01:51) (178/86 - 202/95)  RR: 18 (08-11-23 @ 00:41) (18 - 20)  SpO2: 95% (08-11-23 @ 00:41) (95% - 97%)    GENERAL: patient appears well, no acute distress, appropriate, pleasant  EYES: sclera clear, no exudates  ENMT: oropharynx clear without erythema, no exudates, moist mucous membranes  NECK: supple, soft, no thyromegaly noted  LUNGS: good air entry bilaterally, clear to auscultation, symmetric breath sounds, no wheezing or rhonchi appreciated  HEART: soft S1/S2, regular rate and rhythm, no murmurs noted, no lower extremity edema  GASTROINTESTINAL: abdomen is soft, nontender, nondistended, normoactive bowel sounds, no palpable masses  INTEGUMENT: good skin turgor, warm skin, appears well perfused  MUSCULOSKELETAL: no clubbing or cyanosis, no obvious deformity  NEUROLOGIC: awake, alert, oriented x3, good muscle tone in 4 extremities, no obvious sensory deficits  PSYCHIATRIC: mood is good, affect is congruent, linear and logical thought process  HEME/LYMPH: no palpable supraclavicular nodules, no obvious ecchymosis or petechiae Benign prostatic hyperplasia

## 2024-10-07 NOTE — H&P ADULT - ATTENDING COMMENTS
64M PMHx HTN, T2DM, BPH, prior nephrolithiasis presenting with multiple days of left flank pain 2/2 kidney stone c/b AUSTIN and UTI, now admitted for placement of nephrostomy tube.     #ureteral obstruction  #obstructive AUSTIN (Cr 1.9 from .9 5/2023)  #UTI  #nephrolithiasis  #UTI  - uro and IR recs appreciated  - plan for IR nephrostomy tube placement  - f/u o/p urology  - continue cipro

## 2024-10-07 NOTE — ED PROVIDER NOTE - PROGRESS NOTE DETAILS
William De La Garza MD: Urology consulted due to persistent large left ureteral stone. They feel this cannot be stented due to size, offered left nephrostomy tube to patient for symptom management pending eventual stone removal outpatient. Patient accepted. IR consulted, will plan for procedure tomorrow. Patient on no anticoagulation.

## 2024-10-07 NOTE — H&P ADULT - NSHPREVIEWOFSYSTEMS_GEN_ALL_CORE
REVIEW OF SYSTEMS:    CONSTITUTIONAL:  No weakness, fevers  EYES/ENT:  No visual changes;  No vertigo or throat pain   NECK:  No pain or stiffness  RESPIRATORY:  No cough, wheezing, hemoptysis; No shortness of breath  CARDIOVASCULAR:  No chest pain or palpitations  GASTROINTESTINAL:  No abdominal or epigastric pain. No nausea, vomiting, or hematemesis; No diarrhea or constipation. No melena or hematochezia.  GENITOURINARY:  No current hematuria  NEUROLOGICAL:  No numbness or weakness  SKIN:  No itching, rashes

## 2024-10-07 NOTE — H&P ADULT - NSHPLABSRESULTS_GEN_ALL_CORE
9.4    11.40 )-----------( 504      ( 07 Oct 2024 05:26 )             30.5     WBC trend: 11.40 <--    10-07    136  |  102  |  18  ----------------------------<  222[H]  4.3   |  21[L]  |  1.90[H]    Ca    9.3      07 Oct 2024 05:26    TPro  8.2  /  Alb  3.9  /  TBili  0.3  /  DBili  x   /  AST  21  /  ALT  31  /  AlkPhos  96  10-07    Creatinine trend: 1.90<--        Urinalysis Basic - ( 07 Oct 2024 06:45 )    Color: Yellow / Appearance: Clear / S.012 / pH: x  Gluc: x / Ketone: Negative mg/dL  / Bili: Negative / Urobili: 1.0 mg/dL   Blood: x / Protein: 100 mg/dL / Nitrite: Negative   Leuk Esterase: Moderate / RBC: 8 /HPF /  /HPF   Sq Epi: x / Non Sq Epi: 0 /HPF / Bacteria: Negative /HPF        < from: CT Abdomen and Pelvis No Cont (10.07.24 @ 07:25) >    MPRESSION:  A 1.6 cm calculus in the proximal left ureter resulting in moderate left   hydroureteronephrosis.    Unchanged mild right hydroureteronephrosis.    Increase in size and number of bladder calculi.    Redemonstration of markedly enlarged prostate gland.

## 2024-10-07 NOTE — CONSULT NOTE ADULT - ASSESSMENT
Assessment: 64y Male with large obstructive stone burden presenting with uncontrolled pain. Patient will need multistage urological intervention. Currently with mildly elevated renal indices and uncontrolled pain.    Plan:   - Left PCN with IR 10/08/24  -Please place order for IR Procedure, approving attending Dr. Hari Redman  -NPO past midnight prior to procedure  -Type and screen  -Hold therpaeutic/prophylactic anticoagulants  -AM CBC, BMP, and coags  -IR Pre-procedure note  -Discussed with primary team    Andrea Arteaga MD  PGY-III, Diagnostic Radiology Resident    -Available on [employer] TEAMS  -Emergent issues: [facility]-p.[phone]; [facility]-p.[phone] ([phone])  -Non-emergent consults: Please place a [place] order "IR Consult" with an appropriate callback number  -Scheduling questions: [facility]: [phone]; [facility]: [phone]  -Clinic/Outpatient booking: [facility]: [phone]; [facility]: [phone]

## 2024-10-07 NOTE — H&P ADULT - HISTORY OF PRESENT ILLNESS
63 yo M PMHx HTN, T2DM, BPH (on doxyzosin and flomax), prior nephrolithiasis presenting with multiple days of left flank pain. The patient was originally seen 2-3 years ago but has decline surgery in the past. He states he has previously passed a stone. He follows with Dr Chowdhury but has never pursued surgical options due to concern it would disrupt his ability to work. 3 weeks ago, the patient presented to [facility] with L flank pain, fever, chills, hematuria, and dysuria. CT scan at [facility] demonstrated significant stone burden but patient was unsure of size. At this time he refused nephrostomy tube placement and wanted to seek a second opinion. He was discharged on cipro. 5 days ago he presented to [facility] with increasing flank pain but was discharged with plan to follow up with Dr Reno. He was planning to meet with Dr Reno today 10/8 but was due to 10/10 pain was unable to wait and presented to the ED. He denies any current fever but endorses occasional chills. He denies N/V. Denies CP/SOB.    In the ED, patient was afebrile without tachycardia, /64. He endorsed 10/10 and received dilaudid but states he had nausea with administration. WBC 11.4, BUN/Cr 18/0.9 and UA with mod leukocyte esterase. CT AP demonstrated a 1.6 cm calculus in the proximal left ureter resulting in moderate left hydroureteronephrosis, unchanged mild right hydroureteronephrosis with overall increase in size and number of bladder calculi.

## 2024-10-07 NOTE — ED PROVIDER NOTE - ATTENDING CONTRIBUTION TO CARE
Dr. Jo:  I have personally performed a face to face bedside history and physical examination of this patient. I have discussed the history, examination, review of systems, assessment and plan of management with the resident. I have reviewed the electronic medical record and amended it to reflect my history, review of systems, physical exam, assessment and plan.    64M h/o kidney stones most recently diagnosed with recurrent stone 10/2 (at [facility]), was recommended nephrostomy tubes but patient declined, presents with left flank pain.  Pt has been on cipro, and has referral to Dr Reno but has not yet seen.  Mild dysuria.  CT showed large calculus at the junction of the proximal & lid left ureter with associated obstruction.      Exam:   - nad  - rrr  - ctab   -abd soft ntnd, L CVAT    A/P  -  kidney stone pain  - cbc, cmp, pain control and reassess Dr. Jo:  I have personally performed a face to face bedside history and physical examination of this patient. I have discussed the history, examination, review of systems, assessment and plan of management with the resident. I have reviewed the electronic medical record and amended it to reflect my history, review of systems, physical exam, assessment and plan.    64M h/o kidney stones most recently diagnosed with recurrent stone 10/2 (at [facility]), was recommended nephrostomy tubes but patient declined, presents with left flank pain.  Pt has been on cipro, and has referral to Dr Reno but has not yet seen.  Mild dysuria.  CT showed large calculus at the junction of the proximal & mid left ureter with associated obstruction.   Of note, creatinine was 2.7 and six months prior, reportedly normal creatinine with PCP.    Exam:   - nad  - rrr  - ctab   -abd soft ntnd, L CVAT    A/P  -  pain likely due to renal stone  - cbc, cmp, pain control and reassess

## 2024-10-07 NOTE — ED ADULT TRIAGE NOTE - NS ED TRIAGE AVPU SCALE
Alert-The patient is alert, awake and responds to voice. The patient is oriented to time, place, and person. The triage nurse is able to obtain subjective information.
SOB, rib pain

## 2024-10-07 NOTE — ED ADULT NURSE REASSESSMENT NOTE - NSFALLRISKINTERV_ED_ALL_ED

## 2024-10-07 NOTE — ED ADULT NURSE NOTE - NSFALLUNIVINTERV_ED_ALL_ED
Bed/Stretcher in lowest position, wheels locked, appropriate side rails in place/Call bell, personal items and telephone in reach/Instruct patient to call for assistance before getting out of bed/chair/stretcher/Non-slip footwear applied when patient is off stretcher/[place] to call system/Physically safe environment - no spills, clutter or unnecessary equipment/Purposeful proactive rounding/Room/bathroom lighting operational, light cord in reach

## 2024-10-07 NOTE — ED ADULT NURSE NOTE - OBJECTIVE STATEMENT
Float RN: Pt received, alert and oriented  x4, able to move all extremities and follow commands, Pt c.o worsening flank pain. States he was diagnosed with a kidney stone and has a follow up appointment today but came in due to severe pain. Denies fevers, chills, nausea, vomiting, diarrhea. PMH: HTN, DM, HLD, BPH. Breathing is equal and unlabored on room air. NAD 20g anthony placed to left AC, labs drawn and sent, due medications administered as ordered. Pending dispo.  Report given to primary RN AMIRA Spaulding

## 2024-10-07 NOTE — CONSULT NOTE ADULT - ASSESSMENT
63 yo M w/ h/o BPH (on doxyzosin and flomax), nephrolithiasis, found on CT scan to have 1.6 obstructing L ureteral stone as well as multiple large bladder stones measuring up to 3.3 cm. Was planning to follow up with Dr. Reno but came to ED for uncontrolled pain. AVSS except for /70. WBC 11.4, Cr 1.9 from 1.6 (in 2021). UA neg bacteria, mod leuk esterase, 124 WBC, 8 RBCs, nitrite neg. Ucx pend. CT 1.6 cm stone prox L ureter, mod L hydro, unchanged mild right hydro. Also 7 mm non-obstructing kidney L stone. Increase in number and size of bladder stones (up to 3.3 cm). Large prostate measuring almost 300 cc on CT.     Plan:   63 yo M w/ h/o BPH (on doxyzosin and flomax), nephrolithiasis, found on CT scan to have 1.6 obstructing L ureteral stone as well as multiple large bladder stones measuring up to 3.3 cm. Was planning to follow up with Dr. Reno but came to ED for uncontrolled pain. AVSS except for /70. WBC 11.4, Cr 1.9 from 1.6 (in 2021). UA neg bacteria, mod leuk esterase, 124 WBC, 8 RBCs, nitrite neg. Ucx pend. CT 1.6 cm stone prox L ureter, mod L hydro, unchanged mild right hydro. Also 7 mm non-obstructing kidney L stone. Increase in number and size of bladder stones (up to 3.3 cm). Large prostate measuring almost 300 cc on CT.     Plan:  -no acute urologic intervention   -ureteral stent placement likely not feasible   -can consult IR for possible NT placement   -f/u OP for discussion of prostate/bladder stone/ureteral stone surgery   -can continue cipro until definitive surgery/OP appt     [id number] 65 yo M w/ h/o BPH (on doxyzosin and flomax), nephrolithiasis, found on CT scan to have 1.6 obstructing L ureteral stone as well as multiple large bladder stones measuring up to 3.3 cm. Was planning to follow up with Dr. Reno but came to ED for uncontrolled pain. AVSS except for /70. WBC 11.4, Cr 1.9 from 1.6 (in 2021). UA neg bacteria, mod leuk esterase, 124 WBC, 8 RBCs, nitrite neg. Ucx pend. CT 1.6 cm stone prox L ureter, mod L hydro, unchanged mild right hydro. Also 7 mm non-obstructing kidney L stone. Increase in number and size of bladder stones (up to 3.3 cm). Large prostate measuring almost 300 cc on CT.     Plan:  -no acute urologic intervention   -ureteral stent placement likely not feasible   -can consult IR for possible NT placement   -can continue w cipro for now   -fu ucx   -f/u OP for discussion of prostate/bladder stone/ureteral stone surgery     [id number]

## 2024-10-07 NOTE — H&P ADULT - ASSESSMENT
Patient is a 64M PMHx HTN, T2DM, BPH (on doxyzosin and flomax), prior nephrolithiasis presenting with multiple days of left flank pain with demonstrated increasing stone burden and multiple recent hospitalizations for flank pain, now admitted for placement of nephrostomy tube.

## 2024-10-07 NOTE — ED PROVIDER NOTE - CLINICAL SUMMARY MEDICAL DECISION MAKING FREE TEXT BOX
64yM presenting with L flank pain, hx of stones in 2021. recently diagnosed with stones at [facility]. referral was sent to Dr. Reno but pts pain uncontrolled and is unable to wait for appointment. CT imaging on pts phone shows 6mm L stone in the kidney and L mid ureteral calculus measuring 18mm in height. has been taking meloxicam without relief and ciprofloxacin. last took pain meds 1 day ago. no fevers/chills, nausea, vomiting, abdominal pain. L CVA tenderness. will obtain cbc, cmp, UA/UC, and ct abdomen pelvis. pain control with hydromorphone and Toradol. creatinine 2.77. vitals are stable, afebrile.

## 2024-10-07 NOTE — H&P ADULT - PROBLEM SELECTOR PLAN 1
Patient with known stone burden assessed at outside hospital, now presenting with increased flank pain    -scheduled for nephrostomy tube placement 10/8 with IR  -trend BUN/Cr  -trend WBC, fever  -appreciate urology recs  - f/u UCx  -c/w cipro, s/p ceftriaxone

## 2024-10-07 NOTE — CONSULT NOTE ADULT - SUBJECTIVE AND OBJECTIVE BOX
Vascular & Interventional Radiology Consult Note    Evaluate for Procedure: Left PCN    HPI: 63 yo M w/ h/o BPH (on doxyzosin and flomax), nephrolithiasis presents with L flank pain, found on CT scan to have 1.6 obstructing L ureteral stone as well as multiple large bladder stones measuring up to 3.3 cm. Pt was originally seen for stones 2-3 years ago but declined surgery. Pt unsure of how large they were at the time. Pt says he passed a small stone around that time. He has followed with Dr. Chowdhury off and on since then, but never got surgery due to traveling/[place] out of country. 3 weeks ago, pt presented with L flank pain fevers, chills, hematuria and dysruia to [place], where he was found to have large stone ronak. Pt was offered a NT at the time and refused. Pt left and decided to f/u at hospital closer to him. Pt was d/c’ed on cipro, which he is still on now. 5 days ago presented to [place] with pain. D/c’ed with plan to f/u with kareem and Shadia. Had not yet had appointment with Shadia. Over last 3 weeks, pt denies F/C/N/V, hematuria, dysuria. Did have all this 3 weeks ago, hasn’t since starting cipro.     Allergies: penicillin (Chills)    Medications (Abx/Cardiac/Anticoagulation/Blood Products)      Data:  172.7  95.3  T(C): 36.9  HR: 71  BP: 180/71  RR: 16  SpO2: 99%    -WBC 11.40 / HgB 9.4 / Hct 30.5 / Plt 504  -Na 136 / Cl 102 / BUN 18 / Glucose 222  -K 4.3 / CO2 21 / Cr 1.90  -ALT 31 / Alk Phos 96 / T.Bili 0.3  -INR 1.06 / PTT 23.9      Imaging: CT abdomen 10/7: A 1.6 cm calculus in the proximal left ureter resulting in moderate left hydroureteronephrosis.    Unchanged mild right hydroureteronephrosis.    Increase in size and number of bladder calculi.    Redemonstration of markedly enlarged prostate gland.
63 yo M w/ h/o BPH (on doxyzosin and flomax), nephrolithiasis presents with L flank pain, found on CT scan to have 1.6 obstructing L ureteral stone as well as multiple large bladder stones measuring up to 3.3 cm. Pt was originally seen for stones 2-3 years ago but declined surgery. Pt unsure of how large they were at the time. Pt says he passed a small stone around that time. He has followed with Dr. Chowdhury off and on since then, but never got surgery due to traveling/living out of country. 3 weeks ago, pt presented with L flank pain fevers, chills, hematuria and dysruia to [place], where he was found to have large stone ronak. Pt was offered a NT at the time and refused. Pt left and decided to f/u at hospital closer to him. Pt was d/c’ed on cipro, which he is still on now. 5 days ago presented to [place] with pain. D/c’ed with plan to f/u with kareem and Shadia. Had not yet had appointment with Shadia. Over last 3 weeks, pt denies F/C/N/V, hematuria, dysuria. Did have all this 3 weeks ago, hasn’t since starting cipro. Pain controlled now on dilaudid.      HPI    PAST MEDICAL & SURGICAL HISTORY:  HTN (hypertension)      DM (diabetes mellitus)      History of TIAs      BPH (benign prostatic hyperplasia)      No significant past surgical history          MEDICATIONS  (STANDING):    MEDICATIONS  (PRN):      FAMILY HISTORY:  No pertinent family history in first degree relatives        Allergies    penicillin (Chills)    Intolerances        SOCIAL HISTORY:    REVIEW OF SYSTEMS: Otherwise negative as stated in HPI    Physical Exam  Vital signs  T(C): 36.9 (10-07-24 @ 07:58), Max: 37.2 (10-07-24 @ 04:19)  HR: 58 (10-07-24 @ 07:58)  BP: 173/70 (10-07-24 @ 07:58)  SpO2: 99% (10-07-24 @ 07:58)  Wt(kg): --    Output      Gen: NAD  Pulm: No respiratory distress	  CV: RRR  GI: S/ND/NT  : +L CVA tenderness   MSK: moves all 4 limbs spontaneously    LABS:      10-07 @ 05:26    WBC 11.40 / Hct 30.5  / SCr 1.90     10-07    136  |  102  |  18  ----------------------------<  222[H]  4.3   |  21[L]  |  1.90[H]    Ca    9.3      07 Oct 2024 05:26    TPro  8.2  /  Alb  3.9  /  TBili  0.3  /  DBili  x   /  AST  21  /  ALT  31  /  AlkPhos  96  10-07      Urinalysis Basic - ( 07 Oct 2024 06:45 )    Color: Yellow / Appearance: Clear / S.012 / pH: x  Gluc: x / Ketone: Negative mg/dL  / Bili: Negative / Urobili: 1.0 mg/dL   Blood: x / Protein: 100 mg/dL / Nitrite: Negative   Leuk Esterase: Moderate / RBC: 8 /HPF /  /HPF   Sq Epi: x / Non Sq Epi: 0 /HPF / Bacteria: Negative /HPF            Urine Cx:    Blood Cx:    RADIOLOGY:

## 2024-10-07 NOTE — ED ADULT NURSE NOTE - CCCP TRG CHIEF CMPLNT
Quality 111:Pneumonia Vaccination Status For Older Adults: Pneumococcal Vaccination Previously Received
Detail Level: Detailed
left flank pain

## 2024-10-07 NOTE — ED PROVIDER NOTE - OBJECTIVE STATEMENT
64yM PMH HTN, DM, BPH presenting with L flank pain. was diagnosed with kidney stone at [facility] 10/2. ct report on pts phone shows 6mm L kidney stone and mid L ureter calculus measuring 18mm in height, also has clusters in the bladder. creatinine 2.77. was told he may need procedure to remove the stones and he did not that done. contacted his outpatient urologist and was recommended to see Dr. Reno. pt comes in tonight due to uncontrolled pain, L>R. has been taking meloxicam without relief. also started on cipro which he has been taking. no nausea, vomiting, abdominal pain, fevers/chills, hematuria. has some dysuria.

## 2024-10-07 NOTE — H&P ADULT - PROBLEM SELECTOR PLAN 2
typically takes amlodipine 10 once daily, advise to switch to amlodipine 5 BID at [place]    -c/w amlodipine 5 BID  -c/w atenolol 50 PO qday

## 2024-10-07 NOTE — H&P ADULT - PROBLEM SELECTOR PLAN 5
- holding therapeutic/prophylactic anticoagulants in preparation for procedure  - will discuss with IR for initiation of AC

## 2024-10-07 NOTE — H&P ADULT - NSHPPHYSICALEXAM_GEN_ALL_CORE
GENERAL: NAD, lying in bed, conversational  HEAD:  Atraumatic, normocephalic  EYES: EOMI, PERRLA, conjunctiva clear  NECK: Supple, trachea midline, no JVD  HEART: +S1/S2, RRR, no murmurs, rubs, or gallops  LUNGS: Unlabored respirations. CTA b/l, no crackles, wheezing, or rhonchi  ABDOMEN: Soft, NTND, +BS. No masses or hernia palpated. + left flank pain on palpation  EXTREMITIES: 2+ peripheral pulses bilaterally. No clubbing, cyanosis, or edema  MSK: no gross deformity in extremities, normal muscle strength/tone  NERVOUS SYSTEM: A&Ox3, moving all extremities spontaneously, sensation intact and equal in b/l extremities  SKIN: No rashes or lesions

## 2024-10-07 NOTE — CHART NOTE - NSCHARTNOTEFT_GEN_A_CORE
Pre-Interventional Radiology Procedure Note    JAMES RODRIGUEZ | [id number]    10-07-24 @ 18:04    Interventional Radiology Attending Physician: Hari Redman    Ordering Attending Physician: Pankaj Parker    Diagnosis/Indication: Patient is a 64y old  Male who presents with a chief complaint of uncontrolled pain in the setting of obstructive uropathy. (07 Oct 2024 15:36)      Procedure: Left PCN    64y    Male    PAST MEDICAL & SURGICAL HISTORY:  HTN (hypertension)      DM (diabetes mellitus)      History of TIAs      BPH (benign prostatic hyperplasia)      No significant past surgical history           CBC Full  -  ( 07 Oct 2024 05:26 )  WBC Count : 11.40 K/uL  RBC Count : 3.65 M/uL  Hemoglobin : 9.4 g/dL  Hematocrit : 30.5 %  Platelet Count - Automated : 504 K/uL  Mean Cell Volume : 83.6 fL  Mean Cell Hemoglobin : 25.8 pg  Mean Cell Hemoglobin Concentration : 30.8 gm/dL  Auto Neutrophil # : 8.57 K/uL  Auto Lymphocyte # : 1.30 K/uL  Auto Monocyte # : 1.12 K/uL  Auto Eosinophil # : 0.28 K/uL  Auto Basophil # : 0.04 K/uL  Auto Neutrophil % : 75.1 %  Auto Lymphocyte % : 11.4 %  Auto Monocyte % : 9.8 %  Auto Eosinophil % : 2.5 %  Auto Basophil % : 0.4 %    10-07    136  |  102  |  18  ----------------------------<  222[H]  4.3   |  21[L]  |  1.90[H]    Ca    9.3      07 Oct 2024 05:26    TPro  8.2  /  Alb  3.9  /  TBili  0.3  /  DBili  x   /  AST  21  /  ALT  31  /  AlkPhos  96  10-07

## 2024-10-07 NOTE — H&P ADULT - TIME BILLING
Time-based billing (NON-critical care).     More than 50% of the visit was spent counseling and / or coordinating care by the attending physician.      The necessity of the time spent during the encounter on this date of service was due to: documentation in [place], reviewing chart and coordinating care with patient/residents and interdisciplinary staff (such as , social workers, etc) as well as reviewing vitals, laboratory data, radiology, medication list, consultants' recommendations and prior records. Interventions were performed as documented above.

## 2024-10-07 NOTE — PATIENT PROFILE ADULT - FALL HARM RISK - HARM RISK INTERVENTIONS

## 2024-10-07 NOTE — PATIENT PROFILE ADULT - NSPROPTRIGHTBILLOFRIGHTS_GEN_A_NUR
Physical Therapy Daily Treatment Note    Date:  2019     Patient Name:  Sil Mendoza    :  1956  MRN: [id number]  Restrictions/Precautions:     Medical/Treatment Diagnosis Information:   · Diagnosis: R rotator cuff repair-revision   · Treatment Diagnosis: R shoulder pain, weakness. s/p rotator cuff revision    Insurance/Certification information:  PT Insurance Information: [address]   Physician Information:  Referring Practitioner: Dr Néstor Kinney of care signed (Y/N):  N      Visit# / total visits:   2nd POC 17 total  Pain level: 3/10       Time In: 130  Time Out:  208  Progress Note: []  Yes  [x]  No  Next due by: Visit #12  Or by 19    Subjective: Pt rpts to clinic 3/10 this date, better earlier this date, but worked and raining today. Tolerated last session well. Objective: Pt continues to tolerate PT sessions well progressing and initiating multiple exercises to increase R shoulder strength and stability. Added/Progressed AROM this date, patient demonstrates increased pain with active ROM. Patient has moderate weakness and fatigue with flexion and abduction. Observation: Rpts to clinic no sling.    Test measurements:  ER 75, IR 80, flex 160, abd/scaption 175 ()  19-- 9 weeks post op     Exercises:   Exercise/Equipment Resistance/Repetitions Other comments   Pendulums  1#   pulleys 5'    Ball Squeeze     Wrist maze  Initiated   gripper  grn   grn flexbar x20 U's, n's, twist   Wrist DB flex/ext/UD/RD/pro/sup x20 3#   Finger ladder x10 flx   Ball up wall  Red swiss ball    bicep curls x20 4# 3 way    AROM flexion/scaption  X 8 flex, 6 abd     Wand 8x     Flex/abd/ER    Shoulder isometrics  5\" x 10     PROM     [x] Provided verbal/tactile cueing for activities related to strengthening, flexibility, endurance, ROM. (99008)  [] Provided verbal/tactile cueing for activities related to improving balance, coordination, kinesthetic sense, posture, motor skill, Long term goal 2: Decrease R shoulder pain to 0/10 for improved ease with ADL (Not met. 10APR)   Long term goal 3: Increase R shoulder AROM to [facility] for improved ease with ADL and lifting/reaching tasks (flx: 90, abd: 85, Ext: 50, IR: R PSIS, ER: subocciput. 16APR)  Long term goal 4: Increase R shoulder strength to 4+/5 all motions for improved ease with lifting and reaching (Not attempted. 16APR)   Long term goal 5: DASH and/or SPADI score to <15% disabled for return to previouc level of function (Scored 51/130=39%disability. 16APR)    Plan:   [x] Continue per plan of care [] Alter current plan (see comments)  [] Plan of care initiated [] Hold pending MD visit [] Discharge    Plan for Next Session:  Continue to progress to tolerance.      Electronically signed by:  Armando Lui PT patient

## 2024-10-07 NOTE — ED PROVIDER NOTE - PHYSICAL EXAMINATION
General: alert, oriented to person, time, place  Psych: mood appropriate  Head: normocephalic; atraumatic  Eyes: PERRLA, EOMI, conjunctivae clear bilaterally, sclerae anicteric  ENT: no nasal flaring, patent nares  Cardio: RRR, no m/r/g, pulses 2+ b/l  Resp: CTAB, no w/r/r  GI: soft/nondistended/nontender  : L CVA tenderness   Neuro: moving all four extremities equally  Skin: No evidence of rash or bruising  MSK: normal movement of all extremities  Lymph/Vasc: no LE edema

## 2024-10-08 ENCOUNTER — TRANSCRIPTION ENCOUNTER (OUTPATIENT)
Age: 64
End: 2024-10-08

## 2024-10-08 VITALS
DIASTOLIC BLOOD PRESSURE: 84 MMHG | HEART RATE: 63 BPM | SYSTOLIC BLOOD PRESSURE: 178 MMHG | RESPIRATION RATE: 18 BRPM | TEMPERATURE: 98 F | OXYGEN SATURATION: 100 %

## 2024-10-08 LAB
A1C WITH ESTIMATED AVERAGE GLUCOSE RESULT: 7.9 % — HIGH (ref 4–5.6)
ALBUMIN SERPL ELPH-MCNC: 3.6 G/DL — SIGNIFICANT CHANGE UP (ref 3.3–5)
ALP SERPL-CCNC: 92 U/L — SIGNIFICANT CHANGE UP (ref 40–120)
ALT FLD-CCNC: 23 U/L — SIGNIFICANT CHANGE UP (ref 4–41)
ANION GAP SERPL CALC-SCNC: 15 MMOL/L — HIGH (ref 7–14)
APTT BLD: 30.8 SEC — SIGNIFICANT CHANGE UP (ref 24.5–35.6)
AST SERPL-CCNC: 14 U/L — SIGNIFICANT CHANGE UP (ref 4–40)
BILIRUB SERPL-MCNC: 0.3 MG/DL — SIGNIFICANT CHANGE UP (ref 0.2–1.2)
BLD GP AB SCN SERPL QL: NEGATIVE — SIGNIFICANT CHANGE UP
BUN SERPL-MCNC: 20 MG/DL — SIGNIFICANT CHANGE UP (ref 7–23)
CALCIUM SERPL-MCNC: 9.4 MG/DL — SIGNIFICANT CHANGE UP (ref 8.4–10.5)
CHLORIDE SERPL-SCNC: 101 MMOL/L — SIGNIFICANT CHANGE UP (ref 98–107)
CO2 SERPL-SCNC: 21 MMOL/L — LOW (ref 22–31)
CREAT SERPL-MCNC: 1.98 MG/DL — HIGH (ref 0.5–1.3)
CULTURE RESULTS: SIGNIFICANT CHANGE UP
EGFR: 37 ML/MIN/1.73M2 — LOW
ESTIMATED AVERAGE GLUCOSE: 180 — SIGNIFICANT CHANGE UP
GLUCOSE BLDC GLUCOMTR-MCNC: 209 MG/DL — HIGH (ref 70–99)
GLUCOSE BLDC GLUCOMTR-MCNC: 224 MG/DL — HIGH (ref 70–99)
GLUCOSE SERPL-MCNC: 192 MG/DL — HIGH (ref 70–99)
HCT VFR BLD CALC: 30.2 % — LOW (ref 39–50)
HGB BLD-MCNC: 9.6 G/DL — LOW (ref 13–17)
INR BLD: 1.02 RATIO — SIGNIFICANT CHANGE UP (ref 0.85–1.16)
MAGNESIUM SERPL-MCNC: 2 MG/DL — SIGNIFICANT CHANGE UP (ref 1.6–2.6)
MCHC RBC-ENTMCNC: 26.3 PG — LOW (ref 27–34)
MCHC RBC-ENTMCNC: 31.8 GM/DL — LOW (ref 32–36)
MCV RBC AUTO: 82.7 FL — SIGNIFICANT CHANGE UP (ref 80–100)
NRBC # BLD: 0 /100 WBCS — SIGNIFICANT CHANGE UP (ref 0–0)
NRBC # FLD: 0 K/UL — SIGNIFICANT CHANGE UP (ref 0–0)
PHOSPHATE SERPL-MCNC: 3.2 MG/DL — SIGNIFICANT CHANGE UP (ref 2.5–4.5)
PLATELET # BLD AUTO: 555 K/UL — HIGH (ref 150–400)
POTASSIUM SERPL-MCNC: 4.4 MMOL/L — SIGNIFICANT CHANGE UP (ref 3.5–5.3)
POTASSIUM SERPL-SCNC: 4.4 MMOL/L — SIGNIFICANT CHANGE UP (ref 3.5–5.3)
PROT SERPL-MCNC: 7.9 G/DL — SIGNIFICANT CHANGE UP (ref 6–8.3)
PROTHROM AB SERPL-ACNC: 12.1 SEC — SIGNIFICANT CHANGE UP (ref 9.9–13.4)
RBC # BLD: 3.65 M/UL — LOW (ref 4.2–5.8)
RBC # FLD: 14.1 % — SIGNIFICANT CHANGE UP (ref 10.3–14.5)
RH IG SCN BLD-IMP: POSITIVE — SIGNIFICANT CHANGE UP
SODIUM SERPL-SCNC: 137 MMOL/L — SIGNIFICANT CHANGE UP (ref 135–145)
SPECIMEN SOURCE: SIGNIFICANT CHANGE UP
WBC # BLD: 9.28 K/UL — SIGNIFICANT CHANGE UP (ref 3.8–10.5)
WBC # FLD AUTO: 9.28 K/UL — SIGNIFICANT CHANGE UP (ref 3.8–10.5)

## 2024-10-08 PROCEDURE — 99239 HOSP IP/OBS DSCHRG MGMT >30: CPT

## 2024-10-08 RX ORDER — ACETAMINOPHEN 325 MG
2 TABLET ORAL
Qty: 0 | Refills: 0 | DISCHARGE
Start: 2024-10-08

## 2024-10-08 RX ORDER — METFORMIN HCL 500 MG
1 TABLET ORAL
Qty: 60 | Refills: 0
Start: 2024-10-08 | End: 2024-11-06

## 2024-10-08 RX ADMIN — Medication 650 MILLIGRAM(S): at 03:48

## 2024-10-08 RX ADMIN — ATENOLOL 50 MILLIGRAM(S): 25 TABLET ORAL at 05:21

## 2024-10-08 RX ADMIN — Medication 650 MILLIGRAM(S): at 15:59

## 2024-10-08 RX ADMIN — Medication 2: at 09:12

## 2024-10-08 RX ADMIN — Medication 650 MILLIGRAM(S): at 02:48

## 2024-10-08 RX ADMIN — Medication 2: at 13:38

## 2024-10-08 RX ADMIN — Medication 200 MILLIGRAM(S): at 05:23

## 2024-10-08 RX ADMIN — Medication 5 MILLIGRAM(S): at 05:21

## 2024-10-08 NOTE — PROGRESS NOTE ADULT - PROBLEM SELECTOR PLAN 5
- holding therapeutic/prophylactic anticoagulants in preparation for procedure  - will discuss with IR for initiation of AC - holding therapeutic/prophylactic anticoagulants in preparation for procedure -> pt no longer undergoing procedure

## 2024-10-08 NOTE — DISCHARGE NOTE NURSING/CASE MANAGEMENT/SOCIAL WORK - NSDCPNINST_GEN_ALL_CORE
No s/s of acute distress noted, Tylenol given for back pain, relief noted, IV access discontinued per discharge policy, patient discharged safely home.

## 2024-10-08 NOTE — DISCHARGE NOTE PROVIDER - NSDCCPTREATMENT_GEN_ALL_CORE_FT
PRINCIPAL PROCEDURE  Procedure: CT abdomen  Findings and Treatment: A 1.6 cm calculus in the proximal left ureter resulting in moderate left   hydroureteronephrosis.  Unchanged mild right hydroureteronephrosis.  Increase in size and number of bladder calculi.  Redemonstration of markedly enlarged prostate gland.< from: CT Abdomen and Pelvis No Cont (10.07.24 @ 07:25) >

## 2024-10-08 NOTE — DISCHARGE NOTE PROVIDER - HOSPITAL COURSE
65 yo M PMHx HTN, T2DM, BPH (on doxyzosin and flomax), prior nephrolithiasis presenting with multiple days of left flank pain. The patient was originally seen 2-3 years ago but has decline surgery in the past. He states he has previously passed a stone. He follows with Dr Chowdhury but has never pursued surgical options due to concern it would disrupt his ability to work. 3 weeks ago, the patient presented to [facility] with L flank pain, fever, chills, hematuria, and dysuria. CT scan at [facility] demonstrated significant stone burden but patient was unsure of size. At this time he refused nephrostomy tube placement and wanted to seek a second opinion. He was discharged on cipro. 5 days ago he presented to [place] with increasing flank pain but was discharged with plan to follow up with Dr Reno. He was planning to meet with Dr Reno today 10/8 but was due to 10/10 pain was unable to wait and presented to the ED. He denies any current fever but endorses occasional chills. He denies N/V. Denies CP/SOB.    In the ED, patient was afebrile without tachycardia, /64. He endorsed 10/10 and received dilaudid but states he had nausea with administration. WBC 11.4, BUN/Cr 18/0.9 and UA with mod leukocyte esterase. CT AP demonstrated a 1.6 cm calculus in the proximal left ureter resulting in moderate left hydroureteronephrosis, unchanged mild right hydroureteronephrosis with overall increase in size and number of bladder calculi.    Discharge Summary     Admission diagnoses:   ***    Discharge diagnoses:   ***    Hospital Course:   For full details, please see H&P, progress notes, consult notes, and ancillary notes. Briefly, *** is a 64y year old Male with a history of ***. The patient's hospital course will be summarized in a problem based format.    # ***    # ***    On day of discharge, patient is clinically stable with no new exam findings or acute symptoms compared to prior. The patient was seen by the attending physician on the date of discharge and deemed stable and acceptable for discharge. The patient's chronic medical conditions were treated accordingly per the patient's home medication regimen. The patient's medication reconciliation, follow up appointments, discharge instructions, and significant lab and diagnostic studies are as noted.     Discharge follow up action items:     1. Follow up with PCP  2. Follow up labs, path, & imaging ***  3. Medication changes ***    Patient's ordered code status: ***    Patient disposition: ***       65 yo M PMHx HTN, T2DM, BPH (on doxyzosin and flomax), prior nephrolithiasis presenting with multiple days of left flank pain. The patient was originally seen 2-3 years ago but has decline surgery in the past. He states he has previously passed a stone. He follows with Dr Chowdhury but has never pursued surgical options due to concern it would disrupt his ability to work. 3 weeks ago, the patient presented to [facility] with L flank pain, fever, chills, hematuria, and dysuria. CT scan at [facility] demonstrated significant stone burden but patient was unsure of size. At this time he refused nephrostomy tube placement and wanted to seek a second opinion. He was discharged on cipro. 5 days ago he presented to [place] with increasing flank pain but was discharged with plan to follow up with Dr Reno. He was planning to meet with Dr Reno today 10/8 but was due to 10/10 pain was unable to wait and presented to the ED. He denies any current fever but endorses occasional chills. He denies N/V. Denies CP/SOB.    In the ED, patient was afebrile without tachycardia, /64. He endorsed 10/10 and received dilaudid but states he had nausea with administration. WBC 11.4, BUN/Cr 18/0.9 and UA with mod leukocyte esterase. CT AP demonstrated a 1.6 cm calculus in the proximal left ureter resulting in moderate left hydroureteronephrosis, unchanged mild right hydroureteronephrosis with overall increase in size and number of bladder calculi.    Discharge Summary     Admission diagnoses:   ***    Discharge diagnoses:   ***    Hospital Course:   For full details, please see H&P, progress notes, consult notes, and ancillary notes. Briefly, *** is a 64y year old Male with a history of ***. The patient's hospital course will be summarized in a problem based format.    # ***    # ***    On day of discharge, patient is clinically stable with no new exam findings or acute symptoms compared to prior. The patient was seen by the attending physician on the date of discharge and deemed stable and acceptable for discharge. The patient's chronic medical conditions were treated accordingly per the patient's home medication regimen. The patient's medication reconciliation, follow up appointments, discharge instructions, and significant lab and diagnostic studies are as noted.     Discharge follow up action items:     1. Follow up with PCP  2. Follow up labs, path, & imaging ***  3. Medication changes ***    Patient's ordered code status: ***    Patient disposition: ***      Problems addressed:  obstructive uropathy, AUSTIN, nephrolithiasis, HTN, BPH, UTI, DM2 This patient is a 65 yo M PMHx HTN, T2DM, BPH (on doxyzosin and flomax), prior nephrolithiasis presenting with multiple days of left flank pain. The patient was originally seen 2-3 years ago but has previously decline surgical intervention. He follows with Dr Chowdhury. 3 weeks ago, the patient presented to [facility] with L flank pain, fever, chills, hematuria, and dysuria. A CT scan at [facility] demonstrated significant stone burden. At this time he refused nephrostomy tube placement and wanted to seek a second opinion, so he was discharged on cipro. 5 days ago he presented to [place] with increasing flank pain but was discharged with plans to follow up with Dr Reno in an outpatient setting. He was planning to meet with Dr Reno on 10/8 but was due to 10/10 pain was unable to wait and presented to the ED on 10/7. In the emergency department, he denied any current fever but endorsed occasional chills. He denied nausea, vomiting, chest pain, and shortness of breath. In the emergency department, he was found to be afebrile but mildly tachycardic with blood pressure of 150/64. He endorsed 10/10 pain and received dilaudid but stated he had nausea with administration. Labs were significant for WBC 11.4, BUN/Cr 18/0.9 and UA with moderate leukocyte esterase. A CT AP demonstrated a 1.6 cm calculus in the proximal left ureter resulting in moderate left hydroureteronephrosis, and unchanged mild right hydroureteronephrosis with overall increase in size and number of bladder calculi. The patient was then seen by both urology and IR and planned for placement of nephrostomy tube on 10/8. He received a single dose of ceftriaxone but was switched to ciprofloxacin per urology recommendations. The patient ultimately declined the procedure and prefers to follow up outpatient with Dr. Reno    Admission diagnoses:   Calculus of kidney    Discharge diagnoses:   Calculus of kidney    Hospital Course:   For full details, please see above as well as H&P, progress notes, consult notes, and ancillary notes. Briefly, Mr Olsen is a 64y year old Male with a history of renal calculi with prior admissions for management.       On day of discharge, patient is clinically stable with no new exam findings or acute symptoms compared to prior. The patient was seen by the attending physician on the date of discharge and deemed stable and acceptable for discharge. The patient's chronic medical conditions were treated accordingly per the patient's home medication regimen. The patient's medication reconciliation, follow up appointments, discharge instructions, and significant lab and diagnostic studies are as noted.     Discharge follow up action items:     1. Follow up with PCP  2. Follow up labs, path, & imaging ***  3. Medication changes ***    Patient's ordered code status: ***    Patient disposition: ***      Problems addressed:  obstructive uropathy, AUSTIN, nephrolithiasis, HTN, BPH, UTI, DM2 This patient is a 63 yo M PMHx HTN, T2DM, BPH (on doxyzosin and flomax), prior nephrolithiasis presenting with multiple days of left flank pain. The patient was originally seen 2-3 years ago but has previously decline surgical intervention. He follows with Dr Chowdhury. 3 weeks ago, the patient presented to [facility] with L flank pain, fever, chills, hematuria, and dysuria. A CT scan at [facility] demonstrated significant stone burden. At this time he refused nephrostomy tube placement and wanted to seek a second opinion, so he was discharged on cipro. 5 days ago he presented to [place] with increasing flank pain but was discharged with plans to follow up with Dr Reno in an outpatient setting. He was planning to meet with Dr Reno on 10/8 but was due to 10/10 pain was unable to wait and presented to the ED on 10/7. In the emergency department, he denied any current fever but endorsed occasional chills. He denied nausea, vomiting, chest pain, and shortness of breath. In the emergency department, he was found to be afebrile but mildly tachycardic with blood pressure of 150/64. He endorsed 10/10 pain and received dilaudid but stated he had nausea with administration. Labs were significant for WBC 11.4, BUN/Cr 18/0.9 and UA with moderate leukocyte esterase. A CT AP demonstrated a 1.6 cm calculus in the proximal left ureter resulting in moderate left hydroureteronephrosis, and unchanged mild right hydroureteronephrosis with overall increase in size and number of bladder calculi. The patient was then seen by both urology and IR and planned for placement of nephrostomy tube on 10/8. He received a single dose of ceftriaxone in the ED but was switched to ciprofloxacin per urology recommendations. The patient ultimately declined the procedure and prefers to follow up outpatient with Dr. Reno.    Admission diagnoses:   Calculus of kidney    Discharge diagnoses:   Calculus of kidney    Hospital Course:   For full details, please see above as well as H&P, progress notes, consult notes, and ancillary notes. Briefly, Mr Olsen is a 64y year old male with a history of renal calculi with prior admissions for management. He was admitted with bilateral kidney stones and found to have an acute kidney injury. He was admitted for pain management, administration of antibiotics, and placement of a nephrostomy tube but ultimately declined the procedure and will follow up outpatient. He was advised regarding future management, as well as the need to return to the hospital if his symptoms worsen or he develops signs of infection.     On day of discharge, patient is clinically stable with no new exam findings or acute symptoms compared to prior. The patient was seen by the attending physician on the date of discharge and deemed stable and acceptable for discharge. The patient's chronic medical conditions were treated accordingly per the patient's home medication regimen. The patient's medication reconciliation, follow up appointments, discharge instructions, and significant lab and diagnostic studies are as noted.     Discharge follow up action items:     1. Follow up with PCP and Urology  3. Initiation of ciprofloxacin which will be continued in the outpatient setting    Patient's ordered code status: full code    Patient disposition: home      Problems addressed:  obstructive uropathy, AUSTIN, nephrolithiasis, HTN, BPH, UTI, DM2 This patient is a 63 yo M PMHx HTN, T2DM, BPH (on doxyzosin and flomax), prior nephrolithiasis presenting with multiple days of left flank pain. The patient was originally seen 2-3 years ago but has previously decline surgical intervention. He follows with Dr Chowdhury. 3 weeks ago, the patient presented to [facility] with L flank pain, fever, chills, hematuria, and dysuria. A CT scan at [facility] demonstrated significant stone burden. At this time he refused nephrostomy tube placement and wanted to seek a second opinion, so he was discharged on cipro. 5 days ago he presented to [place] with increasing flank pain but was discharged with plans to follow up with Dr Reno in an outpatient setting. He was planning to meet with Dr Reno on 10/8 but due to 10/10 pain was unable to wait and presented to the ED on 10/7. In the emergency department, he denied any current fever but endorsed occasional chills. He denied nausea, vomiting, chest pain, and shortness of breath. In the emergency department, he was found to be afebrile but mildly tachycardic with blood pressure of 150/64. He endorsed 10/10 pain and received dilaudid but stated he had nausea with administration. Labs were significant for WBC 11.4, BUN/Cr 18/0.9 and UA with moderate leukocyte esterase. A CT AP demonstrated a 1.6 cm calculus in the proximal left ureter resulting in moderate left hydroureteronephrosis, and unchanged mild right hydroureteronephrosis with overall increase in size and number of bladder calculi. The patient was then seen by both urology and IR and planned for placement of nephrostomy tube on 10/8. He received a single dose of ceftriaxone in the ED but was switched to ciprofloxacin per urology recommendations. The patient ultimately declined the procedure and prefers to follow up outpatient with Dr. Reno for possible PCNL per urology.     Admission diagnoses:   Calculus of kidney    Discharge diagnoses:   Calculus of kidney    Hospital Course:   For full details, please see above as well as H&P, progress notes, consult notes, and ancillary notes. Briefly, Mr Olsen is a 64y year old male with a history of renal calculi with prior admissions for management. He was admitted with bilateral kidney stones and found to have an acute kidney injury. He was admitted for pain management, administration of antibiotics, and placement of a nephrostomy tube but ultimately declined the procedure and will follow up outpatient. He was advised regarding future management, as well as the need to return to the hospital if his symptoms worsen or he develops signs of infection.     On day of discharge, patient is clinically stable with no new exam findings or acute symptoms compared to prior. The patient was seen by the attending physician on the date of discharge and deemed stable and acceptable for discharge. The patient's chronic medical conditions were treated accordingly per the patient's home medication regimen. The patient's medication reconciliation, follow up appointments, discharge instructions, and significant lab and diagnostic studies are as noted.     Discharge follow up action items:     1. Follow up with PCP and Urology  3. Initiation of ciprofloxacin which will be continued in the outpatient setting    Patient's ordered code status: full code    Patient disposition: home      Problems addressed:  obstructive uropathy, AUSTIN, nephrolithiasis, HTN, BPH, UTI, DM2

## 2024-10-08 NOTE — PROGRESS NOTE ADULT - SUBJECTIVE AND OBJECTIVE BOX
Patient is a 64y old  Male who presents with a chief complaint of Flank pain, renal stones (07 Oct 2024 19:06)      SUBJECTIVE / OVERNIGHT EVENTS:  Patient seen and evaluated at bedside.    Denies any fevers, chills, CP, or SOB.      REVIEW OF SYSTEMS:  CONSTITUTIONAL:  No weakness, fevers  EYES/ENT:  No visual changes;  No vertigo or throat pain   NECK:  No pain or stiffness  RESPIRATORY:  No cough, wheezing, hemoptysis; No shortness of breath  CARDIOVASCULAR:  No chest pain or palpitations  GASTROINTESTINAL:  No abdominal or epigastric pain. No nausea, vomiting, or hematemesis; No diarrhea or constipation. No melena or hematochezia.  GENITOURINARY:  No current hematuria  NEUROLOGICAL:  No numbness or weakness  SKIN:  No itching, rashes        acetaminophen     Tablet .. 650 milliGRAM(s) Oral every 6 hours PRN Mild Pain (1 - 3), Moderate Pain (4 - 6)  amLODIPine   Tablet 5 milliGRAM(s) Oral <User Schedule>  atenolol  Tablet 50 milliGRAM(s) Oral daily  ciprofloxacin   IVPB 400 milliGRAM(s) IV Intermittent every 12 hours  dextrose 5%. 1000 milliLiter(s) IV Continuous <Continuous>  dextrose 5%. 1000 milliLiter(s) IV Continuous <Continuous>  dextrose 50% Injectable 12.5 Gram(s) IV Push once  dextrose 50% Injectable 25 Gram(s) IV Push once  dextrose 50% Injectable 25 Gram(s) IV Push once  dextrose Oral Gel 15 Gram(s) Oral once PRN Blood Glucose LESS THAN 70 milliGRAM(s)/deciliter  glucagon  Injectable 1 milliGRAM(s) IntraMuscular once  influenza  Vaccine (HIGH DOSE) 0.5 milliLiter(s) IntraMuscular once  insulin lispro (ADMELOG) corrective regimen sliding scale   SubCutaneous three times a day before meals  tamsulosin 0.8 milliGRAM(s) Oral at bedtime        Vital Signs Last 24 Hrs  T(C): 36.8 (08 Oct 2024 05:10), Max: 37.3 (07 Oct 2024 21:30)  T(F): 98.3 (08 Oct 2024 05:10), Max: 99.2 (07 Oct 2024 21:30)  HR: 72 (08 Oct 2024 05:10) (63 - 88)  BP: 155/87 (08 Oct 2024 05:10) (150/64 - 181/81)  BP(mean): --  RR: 18 (08 Oct 2024 05:10) (16 - 20)  SpO2: 99% (08 Oct 2024 05:10) (99% - 100%)    Parameters below as of 08 Oct 2024 05:10  Patient On (Oxygen Delivery Method): room air          PHYSICAL EXAM:  GENERAL: NAD, lying in bed, conversational  HEAD:  Atraumatic, normocephalic  EYES: EOMI, PERRLA, conjunctiva clear  NECK: Supple, trachea midline, no JVD  HEART: +S1/S2, RRR, no murmurs, rubs, or gallops  LUNGS: Unlabored respirations. CTA b/l, no crackles, wheezing, or rhonchi  ABDOMEN: Soft, NTND, +BS. No masses or hernia palpated. + left flank pain on palpation  EXTREMITIES: 2+ peripheral pulses bilaterally. No clubbing, cyanosis, or edema  MSK: no gross deformity in extremities, normal muscle strength/tone  NERVOUS SYSTEM: A&Ox3, moving all extremities spontaneously, sensation intact and equal in b/l extremities  SKIN: No rashes or lesions      LABS:                        9.6    9.28  )-----------( 555      ( 08 Oct 2024 05:35 )             30.2     Hgb Trend: 9.6<--, 9.4<--  10-08    137  |  101  |  20  ----------------------------<  192[H]  4.4   |  21[L]  |  1.98[H]    Ca    9.4      08 Oct 2024 05:35  Phos  3.2     10-08  Mg     2.00     10-08    TPro  7.9  /  Alb  3.6  /  TBili  0.3  /  DBili  x   /  AST  14  /  ALT  23  /  AlkPhos  92  10-08    Creatinine Trend: 1.98<--, 1.90<--      LIVER FUNCTIONS - ( 08 Oct 2024 05:35 )  Alb: 3.6 g/dL / Pro: 7.9 g/dL / ALK PHOS: 92 U/L / ALT: 23 U/L / AST: 14 U/L / GGT: x           PT/INR - ( 08 Oct 2024 05:35 )   PT: 12.1 sec;   INR: 1.02 ratio         PTT - ( 08 Oct 2024 05:35 )  PTT:30.8 sec  Urinalysis Basic - ( 08 Oct 2024 05:35 )    Color: x / Appearance: x / SG: x / pH: x  Gluc: 192 mg/dL / Ketone: x  / Bili: x / Urobili: x   Blood: x / Protein: x / Nitrite: x   Leuk Esterase: x / RBC: x / WBC x   Sq Epi: x / Non Sq Epi: x / Bacteria: x     Patient is a 64y old  Male who presents with a chief complaint of Flank pain, renal stones (07 Oct 2024 19:06)      SUBJECTIVE / OVERNIGHT EVENTS:  Patient seen and evaluated at bedside. Denies any fevers, chills, CP, or SOB. Patient no longer desires to undergo procedure with IR and prefers to follow outpatient. IR team was notified.        REVIEW OF SYSTEMS:  CONSTITUTIONAL:  No weakness, fevers  EYES/ENT:  No visual changes;  No vertigo or throat pain   NECK:  No pain or stiffness  RESPIRATORY:  No cough, wheezing, hemoptysis; No shortness of breath  CARDIOVASCULAR:  No chest pain or palpitations  GASTROINTESTINAL:  No abdominal or epigastric pain. No nausea, vomiting, or hematemesis; No diarrhea or constipation. No melena or hematochezia.  GENITOURINARY:  No current hematuria  NEUROLOGICAL:  No numbness or weakness  SKIN:  No itching, rashes        acetaminophen     Tablet .. 650 milliGRAM(s) Oral every 6 hours PRN Mild Pain (1 - 3), Moderate Pain (4 - 6)  amLODIPine   Tablet 5 milliGRAM(s) Oral <User Schedule>  atenolol  Tablet 50 milliGRAM(s) Oral daily  ciprofloxacin   IVPB 400 milliGRAM(s) IV Intermittent every 12 hours  dextrose 5%. 1000 milliLiter(s) IV Continuous <Continuous>  dextrose 5%. 1000 milliLiter(s) IV Continuous <Continuous>  dextrose 50% Injectable 12.5 Gram(s) IV Push once  dextrose 50% Injectable 25 Gram(s) IV Push once  dextrose 50% Injectable 25 Gram(s) IV Push once  dextrose Oral Gel 15 Gram(s) Oral once PRN Blood Glucose LESS THAN 70 milliGRAM(s)/deciliter  glucagon  Injectable 1 milliGRAM(s) IntraMuscular once  influenza  Vaccine (HIGH DOSE) 0.5 milliLiter(s) IntraMuscular once  insulin lispro (ADMELOG) corrective regimen sliding scale   SubCutaneous three times a day before meals  tamsulosin 0.8 milliGRAM(s) Oral at bedtime        Vital Signs Last 24 Hrs  T(C): 36.8 (08 Oct 2024 05:10), Max: 37.3 (07 Oct 2024 21:30)  T(F): 98.3 (08 Oct 2024 05:10), Max: 99.2 (07 Oct 2024 21:30)  HR: 72 (08 Oct 2024 05:10) (63 - 88)  BP: 155/87 (08 Oct 2024 05:10) (150/64 - 181/81)  BP(mean): --  RR: 18 (08 Oct 2024 05:10) (16 - 20)  SpO2: 99% (08 Oct 2024 05:10) (99% - 100%)    Parameters below as of 08 Oct 2024 05:10  Patient On (Oxygen Delivery Method): room air          PHYSICAL EXAM:  GENERAL: NAD, lying in bed, conversational  HEAD:  Atraumatic, normocephalic  EYES: EOMI, PERRLA, conjunctiva clear  NECK: Supple, trachea midline, no JVD  HEART: +S1/S2, RRR, no murmurs, rubs, or gallops  LUNGS: Unlabored respirations. CTA b/l, no crackles, wheezing, or rhonchi  ABDOMEN: Soft, NTND, +BS. No masses or hernia palpated. + left flank pain on palpation  EXTREMITIES: 2+ peripheral pulses bilaterally. No clubbing, cyanosis, or edema  MSK: no gross deformity in extremities, normal muscle strength/tone  NERVOUS SYSTEM: A&Ox3, moving all extremities spontaneously, sensation intact and equal in b/l extremities  SKIN: No rashes or lesions      LABS:                        9.6    9.28  )-----------( 555      ( 08 Oct 2024 05:35 )             30.2     Hgb Trend: 9.6<--, 9.4<--  10-08    137  |  101  |  20  ----------------------------<  192[H]  4.4   |  21[L]  |  1.98[H]    Ca    9.4      08 Oct 2024 05:35  Phos  3.2     10-08  Mg     2.00     10-08    TPro  7.9  /  Alb  3.6  /  TBili  0.3  /  DBili  x   /  AST  14  /  ALT  23  /  AlkPhos  92  10-08    Creatinine Trend: 1.98<--, 1.90<--      LIVER FUNCTIONS - ( 08 Oct 2024 05:35 )  Alb: 3.6 g/dL / Pro: 7.9 g/dL / ALK PHOS: 92 U/L / ALT: 23 U/L / AST: 14 U/L / GGT: x           PT/INR - ( 08 Oct 2024 05:35 )   PT: 12.1 sec;   INR: 1.02 ratio         PTT - ( 08 Oct 2024 05:35 )  PTT:30.8 sec  Urinalysis Basic - ( 08 Oct 2024 05:35 )    Color: x / Appearance: x / SG: x / pH: x  Gluc: 192 mg/dL / Ketone: x  / Bili: x / Urobili: x   Blood: x / Protein: x / Nitrite: x   Leuk Esterase: x / RBC: x / WBC x   Sq Epi: x / Non Sq Epi: x / Bacteria: x

## 2024-10-08 NOTE — DISCHARGE NOTE NURSING/CASE MANAGEMENT/SOCIAL WORK - PATIENT PORTAL LINK FT
You can access the FollowMyHealth Patient Portal offered by [facility] by registering at the following website: [email or web address]. By joining [facility]’s FollowMyHealth portal, you will also be able to view your health information using other applications (apps) compatible with our system.

## 2024-10-08 NOTE — DISCHARGE NOTE PROVIDER - NSDCMRMEDTOKEN_GEN_ALL_CORE_FT
acetaminophen 325 mg oral tablet: 2 tab(s) orally every 6 hours As needed Mild Pain (1 - 3), Moderate Pain (4 - 6)  amLODIPine 10 mg oral tablet: 0.5 tab(s) orally 2 times a day  atenolol 50 mg oral tablet: 1 tab(s) orally once a day  Flomax 0.4 mg oral capsule: 1 cap(s) orally 2 times a day  glipiZIDE 10 mg oral tablet, extended release: 1 tab(s) orally once a day  metFORMIN 500 mg oral tablet: 1 tab(s) orally 2 times a day   acetaminophen 325 mg oral tablet: 2 tab(s) orally every 6 hours As needed Mild Pain (1 - 3), Moderate Pain (4 - 6)  amLODIPine 10 mg oral tablet: 0.5 tab(s) orally 2 times a day  atenolol 50 mg oral tablet: 1 tab(s) orally once a day  Cipro 250 mg oral tablet: 1 tab(s) orally 2 times a day  Flomax 0.4 mg oral capsule: 1 cap(s) orally 2 times a day  glipiZIDE 10 mg oral tablet, extended release: 1 tab(s) orally once a day  metFORMIN 500 mg oral tablet: 1 tab(s) orally 2 times a day

## 2024-10-08 NOTE — PROGRESS NOTE ADULT - PROBLEM SELECTOR PLAN 2
typically takes amlodipine 10 once daily, advise to switch to amlodipine 5 BID at [place]    -c/w amlodipine 5 BID  -c/w atenolol 50 PO qday

## 2024-10-08 NOTE — DISCHARGE NOTE NURSING/CASE MANAGEMENT/SOCIAL WORK - NSDCPEFALRISK_GEN_ALL_CORE
For information on Fall & Injury Prevention, visit: [email or web address] OR  [email or web address] OR  https://www.cdc.gov/steadi/patient.html

## 2024-10-08 NOTE — DISCHARGE NOTE PROVIDER - NSDCCPCAREPLAN_GEN_ALL_CORE_FT
PRINCIPAL DISCHARGE DIAGNOSIS  Diagnosis: Kidney stone on left side  Assessment and Plan of Treatment:       SECONDARY DISCHARGE DIAGNOSES  Diagnosis: AUSTIN (acute kidney injury)  Assessment and Plan of Treatment:      PRINCIPAL DISCHARGE DIAGNOSIS  Diagnosis: Kidney stone on left side  Assessment and Plan of Treatment: You were diagnosed with a kidney stone. Kidney stones can form for a variety of reasons and may obstruct or reduce the flow of urine, leading to build up in the kidney and potentially leading to infection. These stones can also be painful. While some stones may be able to pass out of the urinary tract, not all stones are able to do so and may require emergent intervention. Placement of a nephrostomy tube can help drain the obstruction caused by the kidney stone and reduce future chance of infection. You will need to follow up with your urologist to discussion options for management and treatment of your kidney stones. If you develop signs of infection, including fever, please consider returning to the hospital for evaluation.      SECONDARY DISCHARGE DIAGNOSES  Diagnosis: AUSTIN (acute kidney injury)  Assessment and Plan of Treatment: An acute kidney injury is an injury caused to the kidney that may occur for a variety of reasons, including dehydration, use of certain medications, and obstruction. During your stay, you were found to have an AUSTIN. Many times, AKIs will self-resolve if the cause of the AUSTIN is treated. You will need to follow up with your urologist to monitor the progression of your acute kidney injury and discuss possible treatment plans.     PRINCIPAL DISCHARGE DIAGNOSIS  Diagnosis: Kidney stone on left side  Assessment and Plan of Treatment: You were diagnosed with a kidney stone. Kidney stones can form for a variety of reasons and may obstruct or reduce the flow of urine, leading to build up in the kidney and potentially leading to infection. These stones can also be painful. While some stones may be able to pass out of the urinary tract, not all stones are able to do so and may require emergent intervention. Placement of a nephrostomy tube can help drain the obstruction caused by the kidney stone and reduce future chance of infection. You will need to follow up with your urologist to discussion options for management and treatment of your kidney stones. If you develop signs of infection, including fever, please consider returning to the hospital for evaluation. Please continue with the flomax and ciprofloxacin and followup with the urologist.      SECONDARY DISCHARGE DIAGNOSES  Diagnosis: AUSTIN (acute kidney injury)  Assessment and Plan of Treatment: An acute kidney injury is an injury caused to the kidney that may occur for a variety of reasons, including dehydration, use of certain medications, and obstruction. During your stay, you were found to have an AUSTIN. Many times, AKIs will self-resolve if the cause of the AUSTIN is treated. You will need to follow up with your urologist to monitor the progression of your acute kidney injury and discuss possible treatment plans.  We have adjusted the dosing of your metformin to 500 mg twice a day and sent a new prescription to your pharmacy based on your kidney function.

## 2024-10-08 NOTE — DISCHARGE NOTE PROVIDER - ATTENDING DISCHARGE PHYSICAL EXAMINATION:
VITAL SIGNS:  Vital Signs Last 24 Hrs  T(C): 36.8 (08 Oct 2024 05:10), Max: 37.3 (07 Oct 2024 21:30)  T(F): 98.3 (08 Oct 2024 05:10), Max: 99.2 (07 Oct 2024 21:30)  HR: 72 (08 Oct 2024 05:10) (63 - 88)  BP: 155/87 (08 Oct 2024 05:10) (150/64 - 181/81)  BP(mean): --  RR: 18 (08 Oct 2024 05:10) (16 - 20)  SpO2: 99% (08 Oct 2024 05:10) (99% - 100%)    Parameters below as of 08 Oct 2024 05:10  Patient On (Oxygen Delivery Method): room air        PHYSICAL EXAM:    General: NAD  HEENT: MMM  Neck: supple  Cardiovascular: +S1/S2; RRR  Respiratory: CTA B/L; no W/R/R  Gastrointestinal: soft, NT/ND  Extremities: WWP; no edema, clubbing or cyanosis  Neurological: awake and alert; communicating and able to follow commands without appreciated focal neurologic deficits

## 2024-10-08 NOTE — DISCHARGE NOTE PROVIDER - CARE PROVIDER_API CALL
Ashlee Reno  Urology  [address] [place] [id number]  Phone: [phone]  Fax: [phone]  Follow Up Time:

## 2024-10-08 NOTE — PROGRESS NOTE ADULT - PROBLEM SELECTOR PLAN 1
Patient with known stone burden assessed at outside hospital, now presenting with increased flank pain    -scheduled for nephrostomy tube placement 10/8 with IR  -trend BUN/Cr  -trend WBC, fever  -appreciate urology recs  - f/u UCx  -c/w cipro, s/p ceftriaxone Patient with known stone burden assessed at outside hospital, now presenting with increased flank pain    -scheduled for nephrostomy tube placement 10/8 with IR -> pt no longer requires  -trend BUN/Cr  -trend WBC, fever  -appreciate urology recs  - f/u UCx  -c/w cipro, s/p ceftriaxone

## 2024-10-08 NOTE — PROGRESS NOTE ADULT - ASSESSMENT
63 yo M w/ h/o BPH (on doxyzosin and flomax), nephrolithiasis, found on CT scan to have 1.6 obstructing L ureteral stone as well as multiple large bladder stones measuring up to 3.3 cm. Was planning to follow up with Dr. Reno but came to ED for uncontrolled pain. AVSS except for /70. WBC 11.4, Cr 1.9 from 1.6 (in 2021). UA neg bacteria, mod leuk esterase, 124 WBC, 8 RBCs, nitrite neg. Ucx pend. CT 1.6 cm stone prox L ureter, mod L hydro, unchanged mild right hydro. Also 7 mm non-obstructing kidney L stone. Increase in number and size of bladder stones (up to 3.3 cm). Large prostate measuring almost 300 cc on CT.     Plan:  -no acute urologic intervention  - Discussed with patient oral pain control vs NT placement as he is hesitant to proceed with NT placement. Explained that if his pain can't be controlled with medication that is the only other option for pain management until we are able to proceed with PCNL. Explained we cannot do PCNL inpatient and this is a scheduled procedure. He expressed understanding and will think about this    -ureteral stent placement likely not feasible   -can continue w cipro for now   -fu ucx   -Patine to follow-up with Dr. Reno after discharge for operative planning     [email or web address]  [facility]  [address] [place] [address] [id number] [phone]  
Patient is a 64M PMHx HTN, T2DM, BPH (on doxyzosin and flomax), prior nephrolithiasis presenting with multiple days of left flank pain with demonstrated increasing stone burden and multiple recent hospitalizations for flank pain, now admitted for placement of nephrostomy tube.

## 2024-10-08 NOTE — DISCHARGE NOTE NURSING/CASE MANAGEMENT/SOCIAL WORK - NSDCFUADDAPPT_GEN_ALL_CORE_FT
APPTS ARE READY TO BE MADE: [X] YES    Best Family or Patient Contact (if needed):    Additional Information about above appointments (if needed):    1: Please follow up with your urologist as early as possible  2: If you develop signs of infection, including fever, consider returning to the hospital emergency department

## 2024-10-08 NOTE — DISCHARGE NOTE PROVIDER - CARE PROVIDERS DIRECT ADDRESSES
[email or web address]

## 2024-10-08 NOTE — PROGRESS NOTE ADULT - SUBJECTIVE AND OBJECTIVE BOX
Subjective  Patient feels okay with pain medication. He is hesitant to proceed with NT placement.     Objective    Vital signs  T(F): , Max: 99.2 (10-07-24 @ 21:30)  HR: 72 (10-08-24 @ 05:10)  BP: 155/87 (10-08-24 @ 05:10)  SpO2: 99% (10-08-24 @ 05:10)  Wt(kg): --    Output       Gen: NAD  Abd: soft, nontender, nondistended  : voiding     Labs      10-08 @ 05:35    WBC 9.28  / Hct 30.2  / SCr 1.98     10-07 @ 05:26    WBC 11.40 / Hct 30.5  / SCr 1.90           Urine Cx: ?  Blood Cx: ?    Imaging

## 2024-10-08 NOTE — DISCHARGE NOTE PROVIDER - NSDCFUADDAPPT_GEN_ALL_CORE_FT
APPTS ARE READY TO BE MADE: [X] YES    Best Family or Patient Contact (if needed):    Additional Information about above appointments (if needed):    1: urology  2:   3:     Other comments or requests:    APPTS ARE READY TO BE MADE: [X] YES    Best Family or Patient Contact (if needed):    Additional Information about above appointments (if needed):    1: Please follow up with your urologist as early as possible  2: If you develop signs of infection, including fever, consider returning to the hospital emergency department   APPTS ARE READY TO BE MADE: [X] YES    Best Family or Patient Contact (if needed):    Additional Information about above appointments (if needed):    1: Please follow up with your urologist as early as possible  2: If you develop signs of infection, including fever, consider returning to the hospital emergency department      Patient is scheduled to see Dr. Reno on 10/25 at [address] [place], [place] [id number]

## 2024-10-08 NOTE — DISCHARGE NOTE PROVIDER - NSDCFUSCHEDAPPT_GEN_ALL_CORE_FT
Ashlee Reno  [facility] Physician [facility]  UROLOGY [address]  Scheduled Appointment: 10/25/2024

## 2024-10-24 PROBLEM — N20.0 NEPHROLITHIASIS: Status: ACTIVE | Noted: 2024-10-24

## 2024-10-24 PROBLEM — N21.0 BLADDER CALCULUS: Status: ACTIVE | Noted: 2024-10-24

## 2024-10-24 PROBLEM — N40.1 BENIGN LOCALIZED HYPERPLASIA OF PROSTATE WITH URINARY OBSTRUCTION: Status: ACTIVE | Noted: 2024-10-24

## 2024-10-24 PROBLEM — N13.30 HYDRONEPHROSIS: Status: ACTIVE | Noted: 2024-10-24

## 2024-10-24 PROBLEM — N20.1 URETERAL STONE: Status: ACTIVE | Noted: 2024-10-24

## 2024-10-25 ENCOUNTER — NON-APPOINTMENT (OUTPATIENT)
Age: 64
End: 2024-10-25

## 2024-10-25 ENCOUNTER — APPOINTMENT (OUTPATIENT)
Dept: UROLOGY | Facility: CLINIC | Age: 64
End: 2024-10-25
Payer: MEDICARE

## 2024-10-25 VITALS
BODY MASS INDEX: 31.83 KG/M2 | OXYGEN SATURATION: 98 % | WEIGHT: 210 LBS | DIASTOLIC BLOOD PRESSURE: 84 MMHG | RESPIRATION RATE: 16 BRPM | SYSTOLIC BLOOD PRESSURE: 176 MMHG | HEIGHT: 68 IN | TEMPERATURE: 98.2 F | HEART RATE: 66 BPM

## 2024-10-25 DIAGNOSIS — N13.30 UNSPECIFIED HYDRONEPHROSIS: ICD-10-CM

## 2024-10-25 DIAGNOSIS — Z78.9 OTHER SPECIFIED HEALTH STATUS: ICD-10-CM

## 2024-10-25 DIAGNOSIS — N20.1 CALCULUS OF URETER: ICD-10-CM

## 2024-10-25 DIAGNOSIS — N21.0 CALCULUS IN BLADDER: ICD-10-CM

## 2024-10-25 DIAGNOSIS — N40.1 BENIGN PROSTATIC HYPERPLASIA WITH LOWER URINARY TRACT SYMPMS: ICD-10-CM

## 2024-10-25 DIAGNOSIS — N13.8 BENIGN PROSTATIC HYPERPLASIA WITH LOWER URINARY TRACT SYMPMS: ICD-10-CM

## 2024-10-25 DIAGNOSIS — N20.0 CALCULUS OF KIDNEY: ICD-10-CM

## 2024-10-25 DIAGNOSIS — Z86.79 PERSONAL HISTORY OF OTHER DISEASES OF THE CIRCULATORY SYSTEM: ICD-10-CM

## 2024-10-25 PROCEDURE — 99205 OFFICE O/P NEW HI 60 MIN: CPT

## 2024-10-25 RX ORDER — ATENOLOL 50 MG/1
50 TABLET ORAL
Refills: 0 | Status: ACTIVE | COMMUNITY

## 2024-10-25 RX ORDER — AMLODIPINE BESYLATE 10 MG/1
10 TABLET ORAL
Refills: 0 | Status: ACTIVE | COMMUNITY

## 2024-10-25 RX ORDER — GLIMEPIRIDE 4 MG/1
4 TABLET ORAL
Refills: 0 | Status: ACTIVE | COMMUNITY

## 2024-10-25 RX ORDER — TAMSULOSIN HYDROCHLORIDE 0.4 MG/1
CAPSULE ORAL
Refills: 0 | Status: ACTIVE | COMMUNITY

## 2024-10-25 RX ORDER — LOSARTAN POTASSIUM 100 MG/1
100 TABLET, FILM COATED ORAL
Refills: 0 | Status: ACTIVE | COMMUNITY

## 2024-10-25 RX ORDER — METFORMIN HYDROCHLORIDE 1000 MG/1
1000 TABLET, COATED ORAL
Refills: 0 | Status: ACTIVE | COMMUNITY

## 2024-10-27 LAB — BACTERIA UR CULT: NORMAL

## 2024-11-05 RX ORDER — KETOROLAC TROMETHAMINE 10 MG/1
10 TABLET, FILM COATED ORAL EVERY 8 HOURS
Qty: 15 | Refills: 0 | Status: ACTIVE | COMMUNITY
Start: 2024-11-05 | End: 1900-01-01

## 2024-11-05 RX ORDER — KETOROLAC TROMETHAMINE 10 MG/1
10 TABLET, FILM COATED ORAL EVERY 6 HOURS
Qty: 20 | Refills: 0 | Status: ACTIVE | COMMUNITY
Start: 2024-11-05 | End: 1900-01-01

## 2024-11-07 NOTE — ED ADULT TRIAGE NOTE - WEIGHT IN KG
Orders:    FSH/LH, Pediatrics; Future    Prolactin; Future    DHEA-sulfate; Future    Testosterone, free, total; Future     98.9

## 2024-11-15 ENCOUNTER — OUTPATIENT (OUTPATIENT)
Dept: OUTPATIENT SERVICES | Facility: HOSPITAL | Age: 64
LOS: 1 days | End: 2024-11-15
Payer: COMMERCIAL

## 2024-11-15 VITALS
OXYGEN SATURATION: 98 % | DIASTOLIC BLOOD PRESSURE: 88 MMHG | HEIGHT: 69 IN | TEMPERATURE: 98 F | RESPIRATION RATE: 16 BRPM | SYSTOLIC BLOOD PRESSURE: 179 MMHG | WEIGHT: 218.92 LBS

## 2024-11-15 DIAGNOSIS — N20.0 CALCULUS OF KIDNEY: ICD-10-CM

## 2024-11-15 DIAGNOSIS — Z98.890 OTHER SPECIFIED POSTPROCEDURAL STATES: Chronic | ICD-10-CM

## 2024-11-15 DIAGNOSIS — E11.9 TYPE 2 DIABETES MELLITUS WITHOUT COMPLICATIONS: ICD-10-CM

## 2024-11-15 DIAGNOSIS — I10 ESSENTIAL (PRIMARY) HYPERTENSION: ICD-10-CM

## 2024-11-15 LAB
ANION GAP SERPL CALC-SCNC: 13 MMOL/L — SIGNIFICANT CHANGE UP (ref 5–17)
BLD GP AB SCN SERPL QL: NEGATIVE — SIGNIFICANT CHANGE UP
BUN SERPL-MCNC: 34 MG/DL — HIGH (ref 7–23)
CALCIUM SERPL-MCNC: 9.3 MG/DL — SIGNIFICANT CHANGE UP (ref 8.4–10.5)
CHLORIDE SERPL-SCNC: 100 MMOL/L — SIGNIFICANT CHANGE UP (ref 96–108)
CO2 SERPL-SCNC: 23 MMOL/L — SIGNIFICANT CHANGE UP (ref 22–31)
CREAT SERPL-MCNC: 2.25 MG/DL — HIGH (ref 0.5–1.3)
EGFR: 32 ML/MIN/1.73M2 — LOW
GLUCOSE SERPL-MCNC: 241 MG/DL — HIGH (ref 70–99)
HCT VFR BLD CALC: 33.5 % — LOW (ref 39–50)
HGB BLD-MCNC: 9.9 G/DL — LOW (ref 13–17)
MCHC RBC-ENTMCNC: 24.9 PG — LOW (ref 27–34)
MCHC RBC-ENTMCNC: 29.6 G/DL — LOW (ref 32–36)
MCV RBC AUTO: 84.4 FL — SIGNIFICANT CHANGE UP (ref 80–100)
NRBC # BLD: 0 /100 WBCS — SIGNIFICANT CHANGE UP (ref 0–0)
PLATELET # BLD AUTO: 360 K/UL — SIGNIFICANT CHANGE UP (ref 150–400)
POTASSIUM SERPL-MCNC: 4.4 MMOL/L — SIGNIFICANT CHANGE UP (ref 3.5–5.3)
POTASSIUM SERPL-SCNC: 4.4 MMOL/L — SIGNIFICANT CHANGE UP (ref 3.5–5.3)
RBC # BLD: 3.97 M/UL — LOW (ref 4.2–5.8)
RBC # FLD: 14.3 % — SIGNIFICANT CHANGE UP (ref 10.3–14.5)
RH IG SCN BLD-IMP: POSITIVE — SIGNIFICANT CHANGE UP
SODIUM SERPL-SCNC: 136 MMOL/L — SIGNIFICANT CHANGE UP (ref 135–145)
WBC # BLD: 7.18 K/UL — SIGNIFICANT CHANGE UP (ref 3.8–10.5)
WBC # FLD AUTO: 7.18 K/UL — SIGNIFICANT CHANGE UP (ref 3.8–10.5)

## 2024-11-15 PROCEDURE — 85027 COMPLETE CBC AUTOMATED: CPT

## 2024-11-15 PROCEDURE — 87086 URINE CULTURE/COLONY COUNT: CPT

## 2024-11-15 PROCEDURE — 80048 BASIC METABOLIC PNL TOTAL CA: CPT

## 2024-11-15 PROCEDURE — G0463: CPT

## 2024-11-15 PROCEDURE — 86900 BLOOD TYPING SEROLOGIC ABO: CPT

## 2024-11-15 PROCEDURE — 86901 BLOOD TYPING SEROLOGIC RH(D): CPT

## 2024-11-15 PROCEDURE — 86850 RBC ANTIBODY SCREEN: CPT

## 2024-11-15 PROCEDURE — 87077 CULTURE AEROBIC IDENTIFY: CPT

## 2024-11-15 NOTE — H&P PST ADULT - PROBLEM SELECTOR PLAN 1
Left Percutaneous Nephrolithotomy Left Uroscopy Laser Lithotripsy Left Stent Insertion on 12/6/24  Medical eval pending   Pre-op education provided - all questions answered. Pt verbalized understanding

## 2024-11-15 NOTE — H&P PST ADULT - SKIN
Addended by: ANTHONY MCCORD on: 5/24/2023 02:50 PM     Modules accepted: Orders    
warm and dry/color normal

## 2024-11-15 NOTE — H&P PST ADULT - BP NONINVASIVE MEAN (MM HG)
[facility] 
 
 
 [address] [facility] [address] [id number] 
[phone] Patient: Grzegorz Silva MRN: [id number] Visit Information Date & Time Provider Department Dept. Phone Encounter #  
 2/24/2018 10:15 AM Alex Spencer, [address] [id number] Upcoming Health Maintenance Date Due Hepatitis C Screening 1957 COLONOSCOPY 10/30/1974 DTaP/Tdap/Td series (1 - Tdap) 10/30/1977 ZOSTER VACCINE AGE 60> 8/30/2016 BREAST CANCER SCRN MAMMOGRAM 2/18/2017 Influenza Age 5 to Adult 8/1/2017 PAP AKA CERVICAL CYTOLOGY 3/17/2018 Allergies as of 2/24/2018  Review Complete On: 2/24/2018 By: Tomi Sosa LPN Severity Noted Reaction Type Reactions Pcn [Penicillins]  11/30/2010   Topical Rash Current Immunizations  Never Reviewed Name Date Influenza Vaccine Split 9/24/2011 Not reviewed this visit You Were Diagnosed With   
  
 Codes Comments Acute non-recurrent frontal sinusitis    -  Primary ICD-10-CM: J01.10 ICD-9-CM: 672.5 Vitals BP Pulse Temp Resp Height(growth percentile) Weight(growth percentile) 109/62 (BP 1 Location: Left arm, BP Patient Position: Sitting) 62 95.6 °F (35.3 °C) (Oral) 15 4' 10\" (1.473 m) 129 lb (58.5 kg) LMP SpO2 BMI OB Status Smoking Status (LMP Unknown) 96% 26.96 kg/m2 Postmenopausal Former Smoker Vitals History BMI and BSA Data Body Mass Index Body Surface Area  
 26.96 kg/m 2 1.55 m 2 Preferred Pharmacy Pharmacy Name Phone [address] [facility]. [phone] Your Updated Medication List  
  
   
This list is accurate as of 2/24/18 10:35 AM.  Always use your most recent med list.  
  
  
  
  
 ACTONEL 35 mg tablet Generic drug:  risedronate  
  
 calcium 500 mg Tab Take  by mouth. cholecalciferol 1,000 unit Cap Commonly known as:  VITAMIN D3 Take  by mouth. erythromycin ophthalmic ointment Commonly known as:  ILOTYCIN Apply 1 CM ribbon Q 6 hours x 7 days  
  
 fluticasone 50 mcg/actuation nasal spray Commonly known as:  FLONASE  
1 Spray in each nostril BID HPR PLUS Crea Generic drug:  emollient combination no. 53  
  
 levoFLOXacin 500 mg tablet Commonly known as:  Vertis Grubbs Take 1 Tab by mouth daily. METAMUCIL PO Take  by mouth. Omega-3-DHA-EPA-Fish Oil 1,000 mg (120 mg-180 mg) Cap Take  by mouth. PROBIOTIC PO Take  by mouth. RETIN-A MICRO PUMP 0.08 % Glwp Generic drug:  tretinoin microspheres APPLY A PEA-SIZED AMOUNT TO FACE QHS  
  
 tobramycin-dexamethasone ophthalmic suspension Commonly known as:  TOBRADEX  
  
 zinc gluconate 10 mg lozenges Commonly known as:  ZICAM  
as directed Patient Instructions Saline Nasal Washes: Care Instructions Your Care Instructions Saline nasal washes help keep the nasal passages open by washing out thick or dried mucus. This simple remedy can help relieve symptoms of allergies, sinusitis, and colds. It also can make the nose feel more comfortable by keeping the mucous membranes moist. You may notice a little burning sensation in your nose the first few times you use the solution, but this usually gets better in a few days. Follow-up care is a key part of your treatment and safety. Be sure to make and go to all appointments, and call your doctor if you are having problems. It's also a good idea to know your test results and keep a list of the medicines you take. How can you care for yourself at home? · You can buy premixed saline solution in a squeeze bottle or other sinus rinse products at a drugstore. Read and follow the instructions on the label. · You also can make your own saline solution by adding 1 teaspoon of salt and 1 teaspoon of baking soda to 2 cups of distilled water. · If you use a homemade solution, pour a small amount into a clean bowl. Using a rubber bulb syringe, squeeze the syringe and place the tip in the salt water. Pull a small amount of the salt water into the syringe by relaxing your hand. · Sit down with your head tilted slightly back. Do not lie down. Put the tip of the bulb syringe or the squeeze bottle a little way into one of your nostrils. Gently drip or squirt a few drops into the nostril. Repeat with the other nostril. Some sneezing and gagging are normal at first. 
· Gently blow your nose. · Wipe the syringe or bottle tip clean after each use. · Repeat this 2 or 3 times a day. · Use nasal washes gently if you have nosebleeds often. When should you call for help? Watch closely for changes in your health, and be sure to contact your doctor if: 
? · You often get nosebleeds. ? · You have problems doing the nasal washes. Where can you learn more? Go to http://washington-cipriano.info/. Enter [id number] [phone] in the search box to learn more about \"Saline Nasal Washes: Care Instructions. \" Current as of: May 12, 2017 Content Version: 11.4 © 6647-8831 [employer]. Care instructions adapted under license by [employer] (which disclaims liability or warranty for this information). If you have questions about a medical condition or this instruction, always ask your healthcare professional. [place] [address] any warranty or liability for your use of this information. Sinusitis: Care Instructions Your Care Instructions Sinusitis is an infection of the lining of the sinus cavities in your head. Sinusitis often follows a cold. It causes pain and pressure in your head and face. In most cases, sinusitis gets better on its own in 1 to 2 weeks. But some mild symptoms may last for several weeks. Sometimes antibiotics are needed. Follow-up care is a key part of your treatment and safety.  Be sure to make and go to all appointments, and call your doctor if you are having problems. It's also a good idea to know your test results and keep a list of the medicines you take. How can you care for yourself at home? · Take an over-the-counter pain medicine, such as acetaminophen (Tylenol), ibuprofen (Advil, Motrin), or naproxen (Aleve). Read and follow all instructions on the label. · If the doctor prescribed antibiotics, take them as directed. Do not stop taking them just because you feel better. You need to take the full course of antibiotics. · Be careful when taking over-the-counter cold or flu medicines and Tylenol at the same time. Many of these medicines have acetaminophen, which is Tylenol. Read the labels to make sure that you are not taking more than the recommended dose. Too much acetaminophen (Tylenol) can be harmful. · Breathe warm, moist air from a steamy shower, a hot bath, or a sink filled with hot water. Avoid cold, dry air. Using a humidifier in your home may help. Follow the directions for cleaning the machine. · Use saline (saltwater) nasal washes to help keep your nasal passages open and wash out mucus and bacteria. You can buy saline nose drops at a grocery store or drugstore. Or you can make your own at home by adding 1 teaspoon of salt and 1 teaspoon of baking soda to 2 cups of distilled water. If you make your own, fill a bulb syringe with the solution, insert the tip into your nostril, and squeeze gently. Catalina Roper your nose. · Put a hot, wet towel or a warm gel pack on your face 3 or 4 times a day for 5 to 10 minutes each time. · Try a decongestant nasal spray like oxymetazoline (Afrin). Do not use it for more than 3 days in a row. Using it for more than 3 days can make your congestion worse. When should you call for help? Call your doctor now or seek immediate medical care if: 
? · You have new or worse swelling or redness in your face or around your eyes. ? · You have a new or higher fever. ? Watch closely for changes in your health, and be sure to contact your doctor if: 
? · You have new or worse facial pain. ? · The mucus from your nose becomes thicker (like pus) or has new blood in it. ? · You are not getting better as expected. Where can you learn more? Go to http://washington-cipriano.info/. Enter H643 in the search box to learn more about \"Sinusitis: Care Instructions. \" Current as of: May 12, 2017 Content Version: 11.4 © 3300-9176 [employer]. Care instructions adapted under license by [employer] (which disclaims liability or warranty for this information). If you have questions about a medical condition or this instruction, always ask your healthcare professional. [address] any warranty or liability for your use of this information. Introducing [employer]more! Katina Buchanan introduces COGEON patient portal. Now you can access parts of your medical record, email your doctor's office, and request medication refills online. 1. In your internet browser, go to [email or web address] 2. Click on the First Time User? Click Here link in the Sign In box. You will see the New Member Sign Up page. 3. Enter your COGEON Access Code exactly as it appears below. You will not need to use this code after youve completed the sign-up process. If you do not sign up before the expiration date, you must request a new code. · COGEON Access Code: [email or web address] Expires: 5/25/2018 10:35 AM 
 
4. Enter the last four digits of your Social Security Number (xxxx) and Date of Birth (mm/dd/yyyy) as indicated and click Submit. You will be taken to the next sign-up page. 5. Create a COGEON ID. This will be your COGEON login ID and cannot be changed, so think of one that is secure and easy to remember. 6. Create a Konkurat password. You can change your password at any time. 7. Enter your Password Reset Question and Answer. This can be used at a later time if you forget your password. 8. Enter your e-mail address. You will receive e-mail notification when new information is available in [address] 9. Click Sign Up. You can now view and download portions of your medical record. 10. Click the Download Summary menu link to download a portable copy of your medical information. If you have questions, please visit the Frequently Asked Questions section of the [employer] website. Remember, Share[employer]ve is NOT to be used for urgent needs. For medical emergencies, dial 911. Now available from your iPhone and Android! Please provide this summary of care documentation to your next provider. Your primary care clinician is listed as NONE. If you have any questions after today's visit, please call [phone]. [phone]

## 2024-11-15 NOTE — H&P PST ADULT - NSICDXPASTMEDICALHX_GEN_ALL_CORE_FT
Marcelina notified Rx sent to pharmacy.  
Pt moving tomorrow to [facility]   Script needs to be sent to [facility], [place]  They will fill for patient during this STR stay  
PAST MEDICAL HISTORY:  BPH (benign prostatic hyperplasia)     DM (diabetes mellitus)     History of TIAs     HTN (hypertension)     Kidney stone

## 2024-11-15 NOTE — H&P PST ADULT - HISTORY OF PRESENT ILLNESS
65 y/o M with h/o HTN, T2DM (manage by PCP) , BPH, Nephrolithiasis (2022, passed on his own) c/o left flank pain hematuria, and dysuria s/p ER visit  CT demonstrated a 1.6 cm calculus in the proximal left ureter resulting in moderate left hydroureteronephrosis, Today he presets to PST for scheduled Left Percutaneous Nephrolithotomy Left Uroscopy Laser Lithotripsy Left Stent Insertion on 12/6/24. Denies any palpitations, SOB, N/V, fever or chills.

## 2024-11-16 LAB
CULTURE RESULTS: ABNORMAL
SPECIMEN SOURCE: SIGNIFICANT CHANGE UP

## 2024-11-19 ENCOUNTER — NON-APPOINTMENT (OUTPATIENT)
Age: 64
End: 2024-11-19

## 2024-12-06 ENCOUNTER — INPATIENT (INPATIENT)
Facility: HOSPITAL | Age: 64
LOS: 1 days | Discharge: HOME HEALTH | DRG: 694 | End: 2024-12-08
Attending: UROLOGY | Admitting: UROLOGY
Payer: COMMERCIAL

## 2024-12-06 ENCOUNTER — TRANSCRIPTION ENCOUNTER (OUTPATIENT)
Age: 64
End: 2024-12-06

## 2024-12-06 VITALS
HEIGHT: 69 IN | HEART RATE: 79 BPM | SYSTOLIC BLOOD PRESSURE: 182 MMHG | DIASTOLIC BLOOD PRESSURE: 84 MMHG | RESPIRATION RATE: 18 BRPM | WEIGHT: 218.92 LBS | OXYGEN SATURATION: 100 % | TEMPERATURE: 99 F

## 2024-12-06 DIAGNOSIS — N20.1 CALCULUS OF URETER: ICD-10-CM

## 2024-12-06 DIAGNOSIS — N20.0 CALCULUS OF KIDNEY: ICD-10-CM

## 2024-12-06 DIAGNOSIS — Z98.890 OTHER SPECIFIED POSTPROCEDURAL STATES: Chronic | ICD-10-CM

## 2024-12-06 LAB
ANION GAP SERPL CALC-SCNC: 13 MMOL/L — SIGNIFICANT CHANGE UP (ref 5–17)
BASOPHILS # BLD AUTO: 0.02 K/UL — SIGNIFICANT CHANGE UP (ref 0–0.2)
BASOPHILS NFR BLD AUTO: 0.3 % — SIGNIFICANT CHANGE UP (ref 0–2)
BUN SERPL-MCNC: 23 MG/DL — SIGNIFICANT CHANGE UP (ref 7–23)
CALCIUM SERPL-MCNC: 9 MG/DL — SIGNIFICANT CHANGE UP (ref 8.4–10.5)
CHLORIDE SERPL-SCNC: 101 MMOL/L — SIGNIFICANT CHANGE UP (ref 96–108)
CO2 SERPL-SCNC: 24 MMOL/L — SIGNIFICANT CHANGE UP (ref 22–31)
CREAT SERPL-MCNC: 2.79 MG/DL — HIGH (ref 0.5–1.3)
EGFR: 24 ML/MIN/1.73M2 — LOW
EOSINOPHIL # BLD AUTO: 0.04 K/UL — SIGNIFICANT CHANGE UP (ref 0–0.5)
EOSINOPHIL NFR BLD AUTO: 0.7 % — SIGNIFICANT CHANGE UP (ref 0–6)
GLUCOSE BLDC GLUCOMTR-MCNC: 174 MG/DL — HIGH (ref 70–99)
GLUCOSE BLDC GLUCOMTR-MCNC: 182 MG/DL — HIGH (ref 70–99)
GLUCOSE BLDC GLUCOMTR-MCNC: 192 MG/DL — HIGH (ref 70–99)
GLUCOSE BLDC GLUCOMTR-MCNC: 204 MG/DL — HIGH (ref 70–99)
GLUCOSE BLDC GLUCOMTR-MCNC: 318 MG/DL — HIGH (ref 70–99)
GLUCOSE BLDC GLUCOMTR-MCNC: 322 MG/DL — HIGH (ref 70–99)
GLUCOSE BLDC GLUCOMTR-MCNC: 342 MG/DL — HIGH (ref 70–99)
GLUCOSE SERPL-MCNC: 215 MG/DL — HIGH (ref 70–99)
HCT VFR BLD CALC: 31.2 % — LOW (ref 39–50)
HGB BLD-MCNC: 9.6 G/DL — LOW (ref 13–17)
IMM GRANULOCYTES NFR BLD AUTO: 0.3 % — SIGNIFICANT CHANGE UP (ref 0–0.9)
LYMPHOCYTES # BLD AUTO: 1.33 K/UL — SIGNIFICANT CHANGE UP (ref 1–3.3)
LYMPHOCYTES # BLD AUTO: 21.8 % — SIGNIFICANT CHANGE UP (ref 13–44)
MCHC RBC-ENTMCNC: 24.9 PG — LOW (ref 27–34)
MCHC RBC-ENTMCNC: 30.8 G/DL — LOW (ref 32–36)
MCV RBC AUTO: 81 FL — SIGNIFICANT CHANGE UP (ref 80–100)
MONOCYTES # BLD AUTO: 0.53 K/UL — SIGNIFICANT CHANGE UP (ref 0–0.9)
MONOCYTES NFR BLD AUTO: 8.7 % — SIGNIFICANT CHANGE UP (ref 2–14)
NEUTROPHILS # BLD AUTO: 4.17 K/UL — SIGNIFICANT CHANGE UP (ref 1.8–7.4)
NEUTROPHILS NFR BLD AUTO: 68.2 % — SIGNIFICANT CHANGE UP (ref 43–77)
NRBC # BLD: 0 /100 WBCS — SIGNIFICANT CHANGE UP (ref 0–0)
PLATELET # BLD AUTO: 313 K/UL — SIGNIFICANT CHANGE UP (ref 150–400)
POTASSIUM SERPL-MCNC: 4.1 MMOL/L — SIGNIFICANT CHANGE UP (ref 3.5–5.3)
POTASSIUM SERPL-SCNC: 4.1 MMOL/L — SIGNIFICANT CHANGE UP (ref 3.5–5.3)
RBC # BLD: 3.85 M/UL — LOW (ref 4.2–5.8)
RBC # FLD: 13.7 % — SIGNIFICANT CHANGE UP (ref 10.3–14.5)
SODIUM SERPL-SCNC: 138 MMOL/L — SIGNIFICANT CHANGE UP (ref 135–145)
WBC # BLD: 6.11 K/UL — SIGNIFICANT CHANGE UP (ref 3.8–10.5)
WBC # FLD AUTO: 6.11 K/UL — SIGNIFICANT CHANGE UP (ref 3.8–10.5)

## 2024-12-06 PROCEDURE — 50433 PLMT NEPHROURETERAL CATHETER: CPT | Mod: RT

## 2024-12-06 PROCEDURE — 71045 X-RAY EXAM CHEST 1 VIEW: CPT | Mod: 26

## 2024-12-06 DEVICE — URETERAL CATH SOF-FLEX OPEN END 6FR .040" X 70CM: Type: IMPLANTABLE DEVICE | Site: LEFT | Status: FUNCTIONAL

## 2024-12-06 DEVICE — STENT URET PERCFLX 8X24 DBL J: Type: IMPLANTABLE DEVICE | Site: LEFT | Status: FUNCTIONAL

## 2024-12-06 DEVICE — URETERAL CATH IMAGER II BERN 5FR 65CM: Type: IMPLANTABLE DEVICE | Site: LEFT | Status: FUNCTIONAL

## 2024-12-06 DEVICE — GWIRE ANGLE 0.035INX3CM: Type: IMPLANTABLE DEVICE | Site: LEFT | Status: FUNCTIONAL

## 2024-12-06 DEVICE — DILATOR SHEATH SET 8/10: Type: IMPLANTABLE DEVICE | Site: LEFT | Status: FUNCTIONAL

## 2024-12-06 DEVICE — TRILOGY PROBE KIT 3.9MM X 440MM (BLUE): Type: IMPLANTABLE DEVICE | Site: LEFT | Status: FUNCTIONAL

## 2024-12-06 DEVICE — GUIDEWIRE SENSOR DUAL-FLEX NITINOL STRAIGHT .035" X 150CM: Type: IMPLANTABLE DEVICE | Site: LEFT | Status: FUNCTIONAL

## 2024-12-06 DEVICE — GUIDEWIRE AMPLATZ SUPER-STIFF STRAIGHT .035" X 180CM: Type: IMPLANTABLE DEVICE | Site: LEFT | Status: FUNCTIONAL

## 2024-12-06 RX ORDER — HYDROMORPHONE HYDROCHLORIDE 2 MG/1
2 TABLET ORAL ONCE
Refills: 0 | Status: DISCONTINUED | OUTPATIENT
Start: 2024-12-06 | End: 2024-12-06

## 2024-12-06 RX ORDER — HYDROMORPHONE HYDROCHLORIDE 2 MG/1
0.5 TABLET ORAL
Refills: 0 | Status: DISCONTINUED | OUTPATIENT
Start: 2024-12-06 | End: 2024-12-06

## 2024-12-06 RX ORDER — SENNOSIDES 8.6 MG
2 TABLET ORAL AT BEDTIME
Refills: 0 | Status: DISCONTINUED | OUTPATIENT
Start: 2024-12-06 | End: 2024-12-08

## 2024-12-06 RX ORDER — HEPARIN SODIUM,PORCINE 1000/ML
5000 VIAL (ML) INJECTION EVERY 8 HOURS
Refills: 0 | Status: DISCONTINUED | OUTPATIENT
Start: 2024-12-06 | End: 2024-12-08

## 2024-12-06 RX ORDER — OXYCODONE HYDROCHLORIDE 30 MG/1
10 TABLET ORAL ONCE
Refills: 0 | Status: DISCONTINUED | OUTPATIENT
Start: 2024-12-06 | End: 2024-12-06

## 2024-12-06 RX ORDER — LIDOCAINE 40 MG/G
1 CREAM TOPICAL ONCE
Refills: 0 | Status: COMPLETED | OUTPATIENT
Start: 2024-12-06 | End: 2024-12-06

## 2024-12-06 RX ORDER — 0.9 % SODIUM CHLORIDE 0.9 %
1000 INTRAVENOUS SOLUTION INTRAVENOUS
Refills: 0 | Status: DISCONTINUED | OUTPATIENT
Start: 2024-12-06 | End: 2024-12-08

## 2024-12-06 RX ORDER — SODIUM CHLORIDE 9 MG/ML
3 INJECTION, SOLUTION INTRAMUSCULAR; INTRAVENOUS; SUBCUTANEOUS EVERY 8 HOURS
Refills: 0 | Status: DISCONTINUED | OUTPATIENT
Start: 2024-12-06 | End: 2024-12-06

## 2024-12-06 RX ORDER — ONDANSETRON HYDROCHLORIDE 4 MG/1
4 TABLET, FILM COATED ORAL ONCE
Refills: 0 | Status: DISCONTINUED | OUTPATIENT
Start: 2024-12-06 | End: 2024-12-06

## 2024-12-06 RX ORDER — CIPROFLOXACIN HCL 750 MG
400 TABLET ORAL EVERY 12 HOURS
Refills: 0 | Status: DISCONTINUED | OUTPATIENT
Start: 2024-12-06 | End: 2024-12-08

## 2024-12-06 RX ORDER — CEFAZOLIN SODIUM 10 G
2000 VIAL (EA) INJECTION ONCE
Refills: 0 | Status: COMPLETED | OUTPATIENT
Start: 2024-12-06 | End: 2024-12-06

## 2024-12-06 RX ORDER — LIDOCAINE HCL 20 MG/ML
0.2 VIAL (ML) INJECTION ONCE
Refills: 0 | Status: DISCONTINUED | OUTPATIENT
Start: 2024-12-06 | End: 2024-12-06

## 2024-12-06 RX ORDER — 0.9 % SODIUM CHLORIDE 0.9 %
1000 INTRAVENOUS SOLUTION INTRAVENOUS
Refills: 0 | Status: DISCONTINUED | OUTPATIENT
Start: 2024-12-06 | End: 2024-12-07

## 2024-12-06 RX ORDER — INFLUENZA VIRUS VACCINE 15; 15; 15; 15 UG/.5ML; UG/.5ML; UG/.5ML; UG/.5ML
0.5 SUSPENSION INTRAMUSCULAR ONCE
Refills: 0 | Status: DISCONTINUED | OUTPATIENT
Start: 2024-12-06 | End: 2024-12-08

## 2024-12-06 RX ORDER — ACETAMINOPHEN 500MG 500 MG/1
975 TABLET, COATED ORAL EVERY 6 HOURS
Refills: 0 | Status: DISCONTINUED | OUTPATIENT
Start: 2024-12-06 | End: 2024-12-08

## 2024-12-06 RX ORDER — ONDANSETRON HYDROCHLORIDE 4 MG/1
4 TABLET, FILM COATED ORAL EVERY 6 HOURS
Refills: 0 | Status: DISCONTINUED | OUTPATIENT
Start: 2024-12-06 | End: 2024-12-08

## 2024-12-06 RX ORDER — TAMSULOSIN HYDROCHLORIDE 0.4 MG/1
0.8 CAPSULE ORAL AT BEDTIME
Refills: 0 | Status: DISCONTINUED | OUTPATIENT
Start: 2024-12-06 | End: 2024-12-08

## 2024-12-06 RX ORDER — GLUCAGON INJECTION, SOLUTION 0.5 MG/.1ML
1 INJECTION, SOLUTION SUBCUTANEOUS ONCE
Refills: 0 | Status: DISCONTINUED | OUTPATIENT
Start: 2024-12-06 | End: 2024-12-08

## 2024-12-06 RX ORDER — ATENOLOL 100 MG/1
50 TABLET ORAL DAILY
Refills: 0 | Status: DISCONTINUED | OUTPATIENT
Start: 2024-12-06 | End: 2024-12-08

## 2024-12-06 RX ORDER — CHLORHEXIDINE GLUCONATE 1.2 MG/ML
1 RINSE ORAL ONCE
Refills: 0 | Status: DISCONTINUED | OUTPATIENT
Start: 2024-12-06 | End: 2024-12-06

## 2024-12-06 RX ORDER — OXYCODONE HYDROCHLORIDE 30 MG/1
5 TABLET ORAL ONCE
Refills: 0 | Status: DISCONTINUED | OUTPATIENT
Start: 2024-12-06 | End: 2024-12-06

## 2024-12-06 RX ORDER — AMLODIPINE BESYLATE 10 MG/1
5 TABLET ORAL
Refills: 0 | Status: DISCONTINUED | OUTPATIENT
Start: 2024-12-06 | End: 2024-12-08

## 2024-12-06 RX ADMIN — HYDROMORPHONE HYDROCHLORIDE 0.5 MILLIGRAM(S): 2 TABLET ORAL at 12:12

## 2024-12-06 RX ADMIN — OXYCODONE HYDROCHLORIDE 5 MILLIGRAM(S): 30 TABLET ORAL at 13:05

## 2024-12-06 RX ADMIN — ACETAMINOPHEN 500MG 975 MILLIGRAM(S): 500 TABLET, COATED ORAL at 17:57

## 2024-12-06 RX ADMIN — AMLODIPINE BESYLATE 5 MILLIGRAM(S): 10 TABLET ORAL at 16:57

## 2024-12-06 RX ADMIN — Medication 2 MILLIGRAM(S): at 16:23

## 2024-12-06 RX ADMIN — Medication 200 MILLIGRAM(S): at 21:41

## 2024-12-06 RX ADMIN — Medication 5000 UNIT(S): at 14:19

## 2024-12-06 RX ADMIN — TAMSULOSIN HYDROCHLORIDE 0.8 MILLIGRAM(S): 0.4 CAPSULE ORAL at 21:40

## 2024-12-06 RX ADMIN — ACETAMINOPHEN 500MG 975 MILLIGRAM(S): 500 TABLET, COATED ORAL at 16:57

## 2024-12-06 RX ADMIN — OXYCODONE HYDROCHLORIDE 10 MILLIGRAM(S): 30 TABLET ORAL at 20:18

## 2024-12-06 RX ADMIN — OXYCODONE HYDROCHLORIDE 10 MILLIGRAM(S): 30 TABLET ORAL at 21:18

## 2024-12-06 RX ADMIN — Medication 125 MILLILITER(S): at 16:57

## 2024-12-06 RX ADMIN — HYDROMORPHONE HYDROCHLORIDE 2 MILLIGRAM(S): 2 TABLET ORAL at 17:16

## 2024-12-06 RX ADMIN — Medication 125 MILLILITER(S): at 12:13

## 2024-12-06 RX ADMIN — HYDROMORPHONE HYDROCHLORIDE 0.5 MILLIGRAM(S): 2 TABLET ORAL at 12:27

## 2024-12-06 RX ADMIN — Medication 5000 UNIT(S): at 21:40

## 2024-12-06 RX ADMIN — OXYCODONE HYDROCHLORIDE 5 MILLIGRAM(S): 30 TABLET ORAL at 12:35

## 2024-12-06 RX ADMIN — HYDROMORPHONE HYDROCHLORIDE 2 MILLIGRAM(S): 2 TABLET ORAL at 17:46

## 2024-12-06 RX ADMIN — Medication 2 MILLIGRAM(S): at 15:53

## 2024-12-06 RX ADMIN — Medication 2: at 12:26

## 2024-12-06 RX ADMIN — Medication 1: at 17:22

## 2024-12-06 RX ADMIN — LIDOCAINE 1 PATCH: 40 CREAM TOPICAL at 15:21

## 2024-12-06 RX ADMIN — Medication 125 MILLILITER(S): at 15:55

## 2024-12-06 RX ADMIN — LIDOCAINE 1 PATCH: 40 CREAM TOPICAL at 19:50

## 2024-12-06 NOTE — PROGRESS NOTE ADULT - SUBJECTIVE AND OBJECTIVE BOX
Post op Check    Pt seen and examined without complaints. C/o pain at NT site. Denies SOB/CP/N/V.     Vital Signs Last 24 Hrs  T(C): 36.3 (06 Dec 2024 14:50), Max: 37.1 (06 Dec 2024 07:33)  T(F): 97.3 (06 Dec 2024 14:50), Max: 98.8 (06 Dec 2024 07:33)  HR: 74 (06 Dec 2024 14:50) (72 - 79)  BP: 147/78 (06 Dec 2024 14:50) (132/60 - 182/84)  BP(mean): 112 (06 Dec 2024 14:00) (87 - 113)  RR: 18 (06 Dec 2024 14:50) (16 - 18)  SpO2: 96% (06 Dec 2024 14:50) (93% - 100%)    Parameters below as of 06 Dec 2024 14:50  Patient On (Oxygen Delivery Method): room air        I&O's Summary    06 Dec 2024 07:01  -  06 Dec 2024 15:29  --------------------------------------------------------  IN: 700 mL / OUT: 1770 mL / NET: -1070 mL        Physical Exam  Gen: NAD  Pulm: No respiratory distress, no subcostal retractions  CV: RRR, no JVD  Abd: Soft, NT, ND  Back: L NT draining light pink urine, + tenderness over site  : voiding                          9.6    6.11  )-----------( 313      ( 06 Dec 2024 12:24 )             31.2       12-06    138  |  101  |  23  ----------------------------<  215[H]  4.1   |  24  |  2.79[H]    Ca    9.0      06 Dec 2024 12:24        A/P: 64y Male s/p aborted L PCNL; only left NT placed   Monitor NT output  Cont abx  F/u cultures   DVT prophylaxis/OOB  Incentive spirometry  Strict I&O's  Analgesia and antiemetics as needed  Diet - regular

## 2024-12-06 NOTE — PRE-ANESTHESIA EVALUATION ADULT - NSANTHADDINFOFT_GEN_ALL_CORE
Pre op Glucose low 300's noted. Case discussed with Dr. Reno with option to postpone / proceed. Considering obstructive uropathy and increasing creatinine, he wishes to proceed with planned procedure. Of note, patient is NOT ketosis prone. We will give 6 units insulin now and monitor glucose intra-operatively. In addition, Dr. Reno will have patient seen by Endocrinology post operatively to follow and to fine tune his medications, which patient claims have recently been altered. Risks / benefits / alternatives of GETA discussed with patient, as well as risk of elevated blood glucose. Patient understands, consents and wishes to proceed. All questions answered. Pre op Glucose low 300's noted. Case discussed with Dr. Reno with option to postpone / proceed. Considering obstructive uropathy and increasing creatinine, he wishes to proceed with planned procedure. Of note, patient is NOT ketosis prone. We will give 6 units insulin now and monitor glucose intra-operatively. In addition, Dr. Reno will have patient seen by Endocrinology post operatively to follow and to fine tune his medications, which patient claims have recently been altered. Of note, patient has had multiple glucose readings in the mid 200's over the past 3 months.  Risks / benefits / alternatives of GETA discussed with patient, as well as risk of elevated blood glucose. Patient understands, consents and wishes to proceed. All questions answered.

## 2024-12-06 NOTE — BRIEF OPERATIVE NOTE - NSICDXBRIEFPROCEDURE_GEN_ALL_CORE_FT
11.0   2.85  )-----------( 152      ( 15 Reinaldo 2022 07:10 )             36.4     06-15    136  |  104  |  14  ----------------------------<  94  4.5   |  23  |  0.8    Ca    8.0<L>      15 Reinaldo 2022 07:10  Mg     2.0     06-15    TPro  5.3<L>  /  Alb  2.4<L>  /  TBili  0.5  /  DBili  x   /  AST  27  /  ALT  9   /  AlkPhos  959<H>  06-15        Magnesium, Serum: 2.0 mg/dL (06-15-22 @ 07:10)    Urinalysis Basic - ( 15 Reinaldo 2022 08:00 )    Color: Yellow / Appearance: Cloudy / S.025 / pH: x  Gluc: x / Ketone: Negative  / Bili: Small / Urobili: 1.0 mg/dL   Blood: x / Protein: >=300 mg/dL / Nitrite: Negative   Leuk Esterase: Small / RBC: 11-25 /HPF / WBC 6-10 /HPF   Sq Epi: x / Non Sq Epi: Occasional /HPF / Bacteria: Few    PT/INR - ( 15 Reinaldo 2022 07:10 )   PT: 10.90 sec;   INR: 0.95 ratio       PTT - ( 15 Reinaldo 2022 07:10 )  PTT:35.1 sec    Lactate Trend  06-15 @ 07:10 Lactate:0.5     Serum Pregnancy: Negative (06-15-22 @ 07:10)      Xray Chest 1 View- PORTABLE-Urgent (06.15.22 @ 07:43)   Impression:  Small right pleural effusion.  Unchanged enlarged cardiac silhouette (underlying pericardial effusion   demonstrated on 2022 CT).
PROCEDURES:  Percutaneous nephrostomy 06-Dec-2024 12:14:52  Kiesha Montes De Oca

## 2024-12-06 NOTE — BRIEF OPERATIVE NOTE - OPERATION/FINDINGS
Placement of left nephroureteral catheter ultrasound guided  Purulent urine on access  PCNL aborted

## 2024-12-06 NOTE — PATIENT PROFILE ADULT - HAVE YOU EXPERIENCED VIOLENCE OR A TRAUMATIC EVENT?
Progress Notes by Edward Granados DO at 01/13/17 11:15 AM     Author:  Edward Granados DO Service:  (none) Author Type:  Physician     Filed:  01/16/17 03:57 PM Encounter Date:  1/13/2017 Status:  Signed     :  Edward Granados DO (Physician)            Accompanied by: parent    Karen is a 8 year old female who is brought in for a possible ear infection.  She has had[CI1.1T] ear pain on right[CI1.1M] for[CI1.1T] 1[CI1.1M]  days.  She also has had mild cold symptoms   She has taken meds for this.  Frequent OM:[CI1.1T] no[CI1.1M]  T-Tubes:[CI1.1T] no[CI1.1M]    Patient Active Problem List    Diagnosis    • PE tube check   • Well child check   • Tension headache     No current outpatient prescriptions on file prior to encounter.       Alg: Review of patient's allergies indicates no known allergies.  Meds:reviewed in EPIC  Imms: UTD    OBJECTIVE:  Pulse 109  Temp 99.2 °F (37.3 °C) (Temporal)  Resp 20  Wt 70 lb (31.8 kg)  SpO2 96%  Gen:  Nontoxic in appearance, alert and oriented   Eyes: no conjunctival injection  Ears:[CI1.1T]right[CI1.1M] TM red  Nose:within normal limits  O/P:  Mild pharyngeal erythema; mucous-membranes moist  Neck:  supple, no meningeal signs        Some mild anterior cervical lymphadenopathy, no meningissmus  Lungs: Clear to auscultation; good air entry         no use of accessory muscles  CV:    regular rate and rhythm; no murmurs  Skin:  good turgor; cap refill less than 3 seconds; no cyanosis         no rashes    ASSESSMENT:/PLAN::  Otitis media    No current outpatient prescriptions on file.       Side effects of all medications were discussed.  Patient is instructed to follow up in 2-3 days or as needed.  Patient is to go to the emergency room for any significant change or worsening.  Patient was discharged in a stable condition and was in agreement with the plan.  No further questions.    Electronically Signed by:    Edward Granados DO , 1/13/2017[CI1.1T]            Revision 
History        User Key Date/Time User Provider Type Action    > CI1.1 01/16/17 03:57 PM Edward Granados DO Physician Sign    M - Manual, T - Template            
[FreeTextEntry1] : Ms. 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 She received 6 cycles Carboplatin and Taxol with Dr. Mccullough from 1/7/22 - 5/2/22. She received VBT from 5/13/22 - 5/20/22.  6/22: Reports occasional burning sensation in the vagina, started 10 days ago. Denies vaginal discharge, bleeding, dysuria or bowel issues. Still has residual fatigue though it is improved. Reviewed dilator use if not sexually active.   1/23/23: Patient presents today for follow-up. She states not using the vaginal dilator. Complains of lower anterior rib/chest wall pain after pulling her back. She states having trouble sleeping at night and fatigue. She denies any constipation, diarrhea, vaginal discharge, bleeding or urinary frequency. 12/19/22 CEA 1.4, : 25  8/1/23:  Patient presents for follow up. Patient recently (June) noted urinary hesitancy and occasional dribbling.  She denies diarrhea, constipation, pain, and vaginal discharge. Patient is currently not using the dilator, stated she forgot to use it. Patient follow up with Dr. Mccullough and Dr. Lizama every three months. Last follow up was in June. Patient stated he had episode of rectal bleeding associated with diarrhea in February while on vacation. She has had no further episodes Underwent colonoscopy upon her return which showed internal hemorrhoids.   2/22/24: Pt presents for routine follow-up. Continues f/u with Jan Lizama and Jamel. Generally feeling well, with good energy and appetite. No further urinary leakage, no dysuria, no diarrhea or constipation, no abdominal or pelvic pain, no vaginal bleeding. She does note occasional abdominal discomfort and twinges not clearly related to diet or activity. Not using dilators. Has neuropathy b/l feet which is intermittent and varies in intensity; no longer on gabapentin, although it was effective in the past. She feels stress about her diagnosis and would like surveillance scans to ensure there is no recurrence.  12/1/23 CEA: 1.2, : 23   
no

## 2024-12-07 LAB
ANION GAP SERPL CALC-SCNC: 14 MMOL/L — SIGNIFICANT CHANGE UP (ref 5–17)
BASOPHILS # BLD AUTO: 0.02 K/UL — SIGNIFICANT CHANGE UP (ref 0–0.2)
BASOPHILS NFR BLD AUTO: 0.2 % — SIGNIFICANT CHANGE UP (ref 0–2)
BUN SERPL-MCNC: 22 MG/DL — SIGNIFICANT CHANGE UP (ref 7–23)
CALCIUM SERPL-MCNC: 9.1 MG/DL — SIGNIFICANT CHANGE UP (ref 8.4–10.5)
CHLORIDE SERPL-SCNC: 99 MMOL/L — SIGNIFICANT CHANGE UP (ref 96–108)
CO2 SERPL-SCNC: 21 MMOL/L — LOW (ref 22–31)
CREAT SERPL-MCNC: 2.15 MG/DL — HIGH (ref 0.5–1.3)
EGFR: 33 ML/MIN/1.73M2 — LOW
EOSINOPHIL # BLD AUTO: 0.01 K/UL — SIGNIFICANT CHANGE UP (ref 0–0.5)
EOSINOPHIL NFR BLD AUTO: 0.1 % — SIGNIFICANT CHANGE UP (ref 0–6)
GLUCOSE BLDC GLUCOMTR-MCNC: 273 MG/DL — HIGH (ref 70–99)
GLUCOSE BLDC GLUCOMTR-MCNC: 301 MG/DL — HIGH (ref 70–99)
GLUCOSE BLDC GLUCOMTR-MCNC: 325 MG/DL — HIGH (ref 70–99)
GLUCOSE BLDC GLUCOMTR-MCNC: 330 MG/DL — HIGH (ref 70–99)
GLUCOSE SERPL-MCNC: 291 MG/DL — HIGH (ref 70–99)
HCT VFR BLD CALC: 37 % — LOW (ref 39–50)
HGB BLD-MCNC: 11.1 G/DL — LOW (ref 13–17)
IMM GRANULOCYTES NFR BLD AUTO: 0.4 % — SIGNIFICANT CHANGE UP (ref 0–0.9)
LYMPHOCYTES # BLD AUTO: 1.18 K/UL — SIGNIFICANT CHANGE UP (ref 1–3.3)
LYMPHOCYTES # BLD AUTO: 10.9 % — LOW (ref 13–44)
MCHC RBC-ENTMCNC: 25.1 PG — LOW (ref 27–34)
MCHC RBC-ENTMCNC: 30 G/DL — LOW (ref 32–36)
MCV RBC AUTO: 83.7 FL — SIGNIFICANT CHANGE UP (ref 80–100)
MONOCYTES # BLD AUTO: 0.62 K/UL — SIGNIFICANT CHANGE UP (ref 0–0.9)
MONOCYTES NFR BLD AUTO: 5.7 % — SIGNIFICANT CHANGE UP (ref 2–14)
NEUTROPHILS # BLD AUTO: 8.95 K/UL — HIGH (ref 1.8–7.4)
NEUTROPHILS NFR BLD AUTO: 82.7 % — HIGH (ref 43–77)
NRBC # BLD: 0 /100 WBCS — SIGNIFICANT CHANGE UP (ref 0–0)
PLATELET # BLD AUTO: 355 K/UL — SIGNIFICANT CHANGE UP (ref 150–400)
POTASSIUM SERPL-MCNC: 4.4 MMOL/L — SIGNIFICANT CHANGE UP (ref 3.5–5.3)
POTASSIUM SERPL-SCNC: 4.4 MMOL/L — SIGNIFICANT CHANGE UP (ref 3.5–5.3)
RBC # BLD: 4.42 M/UL — SIGNIFICANT CHANGE UP (ref 4.2–5.8)
RBC # FLD: 13.8 % — SIGNIFICANT CHANGE UP (ref 10.3–14.5)
SODIUM SERPL-SCNC: 134 MMOL/L — LOW (ref 135–145)
WBC # BLD: 10.82 K/UL — HIGH (ref 3.8–10.5)
WBC # FLD AUTO: 10.82 K/UL — HIGH (ref 3.8–10.5)

## 2024-12-07 RX ORDER — SODIUM CHLORIDE 9 MG/ML
1000 INJECTION, SOLUTION INTRAMUSCULAR; INTRAVENOUS; SUBCUTANEOUS
Refills: 0 | Status: DISCONTINUED | OUTPATIENT
Start: 2024-12-07 | End: 2024-12-08

## 2024-12-07 RX ORDER — OXYCODONE HYDROCHLORIDE 30 MG/1
5 TABLET ORAL EVERY 6 HOURS
Refills: 0 | Status: DISCONTINUED | OUTPATIENT
Start: 2024-12-07 | End: 2024-12-08

## 2024-12-07 RX ORDER — OXYCODONE HYDROCHLORIDE 30 MG/1
10 TABLET ORAL EVERY 6 HOURS
Refills: 0 | Status: DISCONTINUED | OUTPATIENT
Start: 2024-12-07 | End: 2024-12-08

## 2024-12-07 RX ADMIN — Medication 5000 UNIT(S): at 14:30

## 2024-12-07 RX ADMIN — Medication 4: at 08:44

## 2024-12-07 RX ADMIN — Medication 5000 UNIT(S): at 05:56

## 2024-12-07 RX ADMIN — ACETAMINOPHEN 500MG 975 MILLIGRAM(S): 500 TABLET, COATED ORAL at 18:10

## 2024-12-07 RX ADMIN — ACETAMINOPHEN 500MG 975 MILLIGRAM(S): 500 TABLET, COATED ORAL at 01:26

## 2024-12-07 RX ADMIN — Medication 5000 UNIT(S): at 21:44

## 2024-12-07 RX ADMIN — ACETAMINOPHEN 500MG 975 MILLIGRAM(S): 500 TABLET, COATED ORAL at 00:26

## 2024-12-07 RX ADMIN — ACETAMINOPHEN 500MG 975 MILLIGRAM(S): 500 TABLET, COATED ORAL at 12:02

## 2024-12-07 RX ADMIN — ATENOLOL 50 MILLIGRAM(S): 100 TABLET ORAL at 05:55

## 2024-12-07 RX ADMIN — ACETAMINOPHEN 500MG 975 MILLIGRAM(S): 500 TABLET, COATED ORAL at 06:55

## 2024-12-07 RX ADMIN — Medication 2 TABLET(S): at 21:47

## 2024-12-07 RX ADMIN — AMLODIPINE BESYLATE 5 MILLIGRAM(S): 10 TABLET ORAL at 17:10

## 2024-12-07 RX ADMIN — Medication 200 MILLIGRAM(S): at 09:27

## 2024-12-07 RX ADMIN — Medication 125 MILLILITER(S): at 08:44

## 2024-12-07 RX ADMIN — TAMSULOSIN HYDROCHLORIDE 0.8 MILLIGRAM(S): 0.4 CAPSULE ORAL at 21:44

## 2024-12-07 RX ADMIN — Medication 4: at 16:27

## 2024-12-07 RX ADMIN — Medication 75 MILLILITER(S): at 09:28

## 2024-12-07 RX ADMIN — LIDOCAINE 1 PATCH: 40 CREAM TOPICAL at 04:00

## 2024-12-07 RX ADMIN — Medication 4: at 12:39

## 2024-12-07 RX ADMIN — Medication 200 MILLIGRAM(S): at 21:44

## 2024-12-07 RX ADMIN — ACETAMINOPHEN 500MG 975 MILLIGRAM(S): 500 TABLET, COATED ORAL at 17:10

## 2024-12-07 RX ADMIN — OXYCODONE HYDROCHLORIDE 10 MILLIGRAM(S): 30 TABLET ORAL at 16:13

## 2024-12-07 RX ADMIN — ACETAMINOPHEN 500MG 975 MILLIGRAM(S): 500 TABLET, COATED ORAL at 11:02

## 2024-12-07 RX ADMIN — ACETAMINOPHEN 500MG 975 MILLIGRAM(S): 500 TABLET, COATED ORAL at 05:55

## 2024-12-07 RX ADMIN — Medication 2 TABLET(S): at 00:25

## 2024-12-07 RX ADMIN — SODIUM CHLORIDE 100 MILLILITER(S): 9 INJECTION, SOLUTION INTRAMUSCULAR; INTRAVENOUS; SUBCUTANEOUS at 17:10

## 2024-12-07 RX ADMIN — OXYCODONE HYDROCHLORIDE 10 MILLIGRAM(S): 30 TABLET ORAL at 15:13

## 2024-12-07 RX ADMIN — AMLODIPINE BESYLATE 5 MILLIGRAM(S): 10 TABLET ORAL at 05:55

## 2024-12-07 RX ADMIN — Medication 1: at 21:42

## 2024-12-07 NOTE — PROGRESS NOTE ADULT - SUBJECTIVE AND OBJECTIVE BOX
Subjective: No acute overnight events. This AM, pt reports ...    Objective    Vital signs  T(F): , Max: 98.8 (12-06-24 @ 07:33)  HR: 68 (12-07-24 @ 05:15)  BP: 152/71 (12-07-24 @ 05:15)  SpO2: 96% (12-07-24 @ 05:15)  Wt(kg): --    Output     OUT:    Indwelling Catheter - Urethral (mL): 4140 mL    Nephrostomy Tube (mL): 230 mL  Total OUT: 4370 mL    Total NET: -4370 mL      Gen  Abd      Labs    AM labs pending      Urine Cx: pending     Subjective: No acute overnight events. This AM, pt reports L flank pain. No other acute complaints.     Objective    Vital signs  T(F): , Max: 98.8 (12-06-24 @ 07:33)  HR: 68 (12-07-24 @ 05:15)  BP: 152/71 (12-07-24 @ 05:15)  SpO2: 96% (12-07-24 @ 05:15)    Output   OUT    Indwelling Catheter - Urethral (mL): 4140 mL    Nephrostomy Tube (mL): 230 mL  Total OUT: 4370 mL    Total NET: -4370 mL      Gen: NAD  Abd: soft, nontender, nondistended. L PCN in place, dressing c/d/i. L PCN with pink urine  : perales in place with yellow urine    Labs                          11.1   10.82 )-----------( 355      ( 07 Dec 2024 08:22 )             37.0   12-07    134[L]  |  99  |  22  ----------------------------<  291[H]  4.4   |  21[L]  |  2.15[H]    Ca    9.1      07 Dec 2024 08:22          Urine Cx: pending

## 2024-12-07 NOTE — PROGRESS NOTE ADULT - ASSESSMENT
A/P: 64y Male s/p aborted L PCNL; only left NT placed     Monitor NT output, flush BID  Cont Cipro  F/u cultures   DVT prophylaxis/OOB  Incentive spirometry  Strict I&O's  Analgesia and antiemetics as needed  Diet - regular  AM labs  TOV/PVR

## 2024-12-08 VITALS
SYSTOLIC BLOOD PRESSURE: 125 MMHG | HEART RATE: 68 BPM | OXYGEN SATURATION: 95 % | TEMPERATURE: 98 F | DIASTOLIC BLOOD PRESSURE: 73 MMHG | RESPIRATION RATE: 18 BRPM

## 2024-12-08 LAB
ANION GAP SERPL CALC-SCNC: 15 MMOL/L — SIGNIFICANT CHANGE UP (ref 5–17)
BUN SERPL-MCNC: 23 MG/DL — SIGNIFICANT CHANGE UP (ref 7–23)
CALCIUM SERPL-MCNC: 8.9 MG/DL — SIGNIFICANT CHANGE UP (ref 8.4–10.5)
CHLORIDE SERPL-SCNC: 98 MMOL/L — SIGNIFICANT CHANGE UP (ref 96–108)
CO2 SERPL-SCNC: 20 MMOL/L — LOW (ref 22–31)
CREAT SERPL-MCNC: 2.38 MG/DL — HIGH (ref 0.5–1.3)
EGFR: 30 ML/MIN/1.73M2 — LOW
GLUCOSE BLDC GLUCOMTR-MCNC: 291 MG/DL — HIGH (ref 70–99)
GLUCOSE BLDC GLUCOMTR-MCNC: 315 MG/DL — HIGH (ref 70–99)
GLUCOSE SERPL-MCNC: 294 MG/DL — HIGH (ref 70–99)
HCT VFR BLD CALC: 33.7 % — LOW (ref 39–50)
HGB BLD-MCNC: 10.3 G/DL — LOW (ref 13–17)
MCHC RBC-ENTMCNC: 25.3 PG — LOW (ref 27–34)
MCHC RBC-ENTMCNC: 30.6 G/DL — LOW (ref 32–36)
MCV RBC AUTO: 82.8 FL — SIGNIFICANT CHANGE UP (ref 80–100)
NRBC # BLD: 0 /100 WBCS — SIGNIFICANT CHANGE UP (ref 0–0)
PLATELET # BLD AUTO: 360 K/UL — SIGNIFICANT CHANGE UP (ref 150–400)
POTASSIUM SERPL-MCNC: 4 MMOL/L — SIGNIFICANT CHANGE UP (ref 3.5–5.3)
POTASSIUM SERPL-SCNC: 4 MMOL/L — SIGNIFICANT CHANGE UP (ref 3.5–5.3)
RBC # BLD: 4.07 M/UL — LOW (ref 4.2–5.8)
RBC # FLD: 13.7 % — SIGNIFICANT CHANGE UP (ref 10.3–14.5)
SODIUM SERPL-SCNC: 133 MMOL/L — LOW (ref 135–145)
WBC # BLD: 9.04 K/UL — SIGNIFICANT CHANGE UP (ref 3.8–10.5)
WBC # FLD AUTO: 9.04 K/UL — SIGNIFICANT CHANGE UP (ref 3.8–10.5)

## 2024-12-08 PROCEDURE — C1894: CPT

## 2024-12-08 PROCEDURE — C1889: CPT

## 2024-12-08 PROCEDURE — 36415 COLL VENOUS BLD VENIPUNCTURE: CPT

## 2024-12-08 PROCEDURE — C9399: CPT

## 2024-12-08 PROCEDURE — C1758: CPT

## 2024-12-08 PROCEDURE — 71045 X-RAY EXAM CHEST 1 VIEW: CPT

## 2024-12-08 PROCEDURE — C1769: CPT

## 2024-12-08 PROCEDURE — 85027 COMPLETE CBC AUTOMATED: CPT

## 2024-12-08 PROCEDURE — 87086 URINE CULTURE/COLONY COUNT: CPT

## 2024-12-08 PROCEDURE — 80048 BASIC METABOLIC PNL TOTAL CA: CPT

## 2024-12-08 PROCEDURE — 85025 COMPLETE CBC W/AUTO DIFF WBC: CPT

## 2024-12-08 PROCEDURE — 82962 GLUCOSE BLOOD TEST: CPT

## 2024-12-08 PROCEDURE — C1887: CPT

## 2024-12-08 PROCEDURE — C2617: CPT

## 2024-12-08 PROCEDURE — 76000 FLUOROSCOPY <1 HR PHYS/QHP: CPT

## 2024-12-08 RX ORDER — ACETAMINOPHEN 500MG 500 MG/1
3 TABLET, COATED ORAL
Qty: 0 | Refills: 0 | DISCHARGE
Start: 2024-12-08

## 2024-12-08 RX ORDER — CIPROFLOXACIN HCL 750 MG
1 TABLET ORAL
Qty: 6 | Refills: 0
Start: 2024-12-08 | End: 2024-12-10

## 2024-12-08 RX ORDER — OXYCODONE HYDROCHLORIDE 30 MG/1
1 TABLET ORAL
Qty: 8 | Refills: 0
Start: 2024-12-08 | End: 2024-12-09

## 2024-12-08 RX ORDER — AMLODIPINE BESYLATE 10 MG/1
1 TABLET ORAL
Qty: 0 | Refills: 0 | DISCHARGE
Start: 2024-12-08

## 2024-12-08 RX ORDER — CIPROFLOXACIN HCL 750 MG
1 TABLET ORAL
Qty: 28 | Refills: 0
Start: 2024-12-08 | End: 2024-12-21

## 2024-12-08 RX ADMIN — OXYCODONE HYDROCHLORIDE 10 MILLIGRAM(S): 30 TABLET ORAL at 11:34

## 2024-12-08 RX ADMIN — OXYCODONE HYDROCHLORIDE 10 MILLIGRAM(S): 30 TABLET ORAL at 02:55

## 2024-12-08 RX ADMIN — ACETAMINOPHEN 500MG 975 MILLIGRAM(S): 500 TABLET, COATED ORAL at 12:30

## 2024-12-08 RX ADMIN — ACETAMINOPHEN 500MG 975 MILLIGRAM(S): 500 TABLET, COATED ORAL at 06:06

## 2024-12-08 RX ADMIN — OXYCODONE HYDROCHLORIDE 10 MILLIGRAM(S): 30 TABLET ORAL at 01:55

## 2024-12-08 RX ADMIN — OXYCODONE HYDROCHLORIDE 10 MILLIGRAM(S): 30 TABLET ORAL at 12:30

## 2024-12-08 RX ADMIN — ACETAMINOPHEN 500MG 975 MILLIGRAM(S): 500 TABLET, COATED ORAL at 00:07

## 2024-12-08 RX ADMIN — ACETAMINOPHEN 500MG 975 MILLIGRAM(S): 500 TABLET, COATED ORAL at 07:07

## 2024-12-08 RX ADMIN — ACETAMINOPHEN 500MG 975 MILLIGRAM(S): 500 TABLET, COATED ORAL at 01:07

## 2024-12-08 RX ADMIN — Medication 3: at 08:15

## 2024-12-08 RX ADMIN — ATENOLOL 50 MILLIGRAM(S): 100 TABLET ORAL at 06:06

## 2024-12-08 RX ADMIN — Medication 5000 UNIT(S): at 06:06

## 2024-12-08 RX ADMIN — SODIUM CHLORIDE 100 MILLILITER(S): 9 INJECTION, SOLUTION INTRAMUSCULAR; INTRAVENOUS; SUBCUTANEOUS at 11:35

## 2024-12-08 RX ADMIN — Medication 200 MILLIGRAM(S): at 10:01

## 2024-12-08 RX ADMIN — ACETAMINOPHEN 500MG 975 MILLIGRAM(S): 500 TABLET, COATED ORAL at 11:35

## 2024-12-08 RX ADMIN — AMLODIPINE BESYLATE 5 MILLIGRAM(S): 10 TABLET ORAL at 06:06

## 2024-12-08 NOTE — DISCHARGE NOTE PROVIDER - HOSPITAL COURSE
64M with PMHx of DM2, CKD, TIA, HTN, BPH, bladder stones, L ureteral stones admitted for L PCNL with Dr Reno on 12/6. L PCNL aborted in OR because upon entry into collecting system purulent urine noted. A L nephroureteral tube was left in place. POD#1 perales was removed for TOV. Pt voided with elevated PVR but was comfortable. POD#2, pt voided more and PVR improved. Pt remained afebrile with stable vitals. Pt did well post op. At the time of discharge, the patient was hemodynamically stable, was tolerating PO diet, was voiding urine and passing stool, was ambulating, and was comfortable with adequate pain control. Pt/family given discharge plan/instructions and agrees with plan. Sent with Cipro and outpatient follow up with Dr Reno to schedule PCNL.

## 2024-12-08 NOTE — DISCHARGE NOTE NURSING/CASE MANAGEMENT/SOCIAL WORK - FINANCIAL ASSISTANCE
[facility] provides services at a reduced cost to those who are determined to be eligible through [facility]’s financial assistance program. Information regarding [facility]’s financial assistance program can be found by going to [email or web address] or by calling [phone].

## 2024-12-08 NOTE — DISCHARGE NOTE PROVIDER - CARE PROVIDER_API CALL
Ashlee Reno  Urology  [address] [place] [id number]  Phone: [phone]  Fax: [phone]  Follow Up Time: 2 weeks

## 2024-12-08 NOTE — DISCHARGE NOTE NURSING/CASE MANAGEMENT/SOCIAL WORK - NSDCPEFALRISK_GEN_ALL_CORE
For information on Fall & Injury Prevention, visit: [email or web address] OR  [email or web address] OR  https://www.cdc.gov/steadi/patient.html

## 2024-12-08 NOTE — PROGRESS NOTE ADULT - ASSESSMENT
A/P: 64y Male s/p aborted L PCNL; only left NT placed     Monitor NT output, flush BID  Cont Cipro  F/U cultures   DVT prophylaxis/OOB  Incentive spirometry  Strict I&O's  Analgesia and antiemetics as needed  Diet - regular  AM labs  recheck PVR  Dispo: home

## 2024-12-08 NOTE — PROGRESS NOTE ADULT - SUBJECTIVE AND OBJECTIVE BOX
Subjective: Pt seen and examined. No overnight events.     Objective  Vital signs  T(F): , Max: 99 (12-08-24 @ 01:25)  HR: 79 (12-08-24 @ 05:18)  BP: 156/80 (12-08-24 @ 05:18)  SpO2: 95% (12-08-24 @ 05:18)    Output     OUT:    Indwelling Catheter - Urethral (mL): 700 mL    Nephrostomy Tube (mL): 1300 mL    Voided (mL): 500 mL  Total OUT: 2500 mL    Total NET: -2500 mL      Gen:  Resp:  Abd:  :    Labs      AM labs      Urine Cx: Culture - Urine (12.06.24 @ 14:04)    Specimen Source: OR Collect Left Kidney Urine   Culture Results:   Culture in progress   Subjective: Pt seen and examined. No overnight events. Pain controlled on current regimen. Voiding    Objective  Vital signs  T(F): , Max: 99 (12-08-24 @ 01:25)  HR: 79 (12-08-24 @ 05:18)  BP: 156/80 (12-08-24 @ 05:18)  SpO2: 95% (12-08-24 @ 05:18)    Output     OUT:    Indwelling Catheter - Urethral (mL): 700 mL    Nephrostomy Tube (mL): 1300 mL    Voided (mL): 500 mL  Total OUT: 2500 mL    Total NET: -2500 mL      Gen: NAD  Resp: No respiratory distress  Abd: soft, nontender, nondistended  : L PCNU in place with pink urine draining    Labs                        10.3   9.04  )-----------( 360      ( 08 Dec 2024 07:21 )             33.7   12-08    133[L]  |  98  |  23  ----------------------------<  294[H]  4.0   |  20[L]  |  2.38[H]    Ca    8.9      08 Dec 2024 07:10      Urine Cx: Culture - Urine (12.06.24 @ 14:04)    Specimen Source: OR Collect Left Kidney Urine   Culture Results:   Culture in progress

## 2024-12-08 NOTE — DISCHARGE NOTE PROVIDER - NSDCMRMEDTOKEN_GEN_ALL_CORE_FT
amLODIPine 10 mg oral tablet: 0.5 tab(s) orally 2 times a day  atenolol 50 mg oral tablet: 1 tab(s) orally once a day  Flomax 0.4 mg oral capsule: 1 cap(s) orally 2 times a day  glipiZIDE 10 mg oral tablet, extended release: 1 tab(s) orally once a day  losartan 25 mg oral tablet: 1 tab(s) orally once a day   acetaminophen 325 mg oral tablet: 3 tab(s) orally every 6 hours  amLODIPine 5 mg oral tablet: 1 tab(s) orally once a day  atenolol 50 mg oral tablet: 1 tab(s) orally once a day  Cipro 500 mg oral tablet: 1 tab(s) orally 2 times a day  Flomax 0.4 mg oral capsule: 1 cap(s) orally 2 times a day  glipiZIDE 10 mg oral tablet, extended release: 1 tab(s) orally once a day  losartan 25 mg oral tablet: 1 tab(s) orally once a day  oxyCODONE 5 mg oral tablet: 1 tab(s) orally every 6 hours as needed for Moderate Pain (4 - 6) MDD: 4   acetaminophen 325 mg oral tablet: 3 tab(s) orally every 6 hours  amLODIPine 5 mg oral tablet: 1 tab(s) orally once a day  atenolol 50 mg oral tablet: 1 tab(s) orally once a day  ciprofloxacin 500 mg oral tablet: 1 tab(s) orally 2 times a day  Flomax 0.4 mg oral capsule: 1 cap(s) orally 2 times a day  glipiZIDE 10 mg oral tablet, extended release: 1 tab(s) orally once a day  losartan 25 mg oral tablet: 1 tab(s) orally once a day  oxyCODONE 5 mg oral tablet: 1 tab(s) orally every 6 hours as needed for Moderate Pain (4 - 6) MDD: 4

## 2024-12-08 NOTE — DISCHARGE NOTE NURSING/CASE MANAGEMENT/SOCIAL WORK - PATIENT PORTAL LINK FT
You can access the FollowMyHealth Patient Portal offered by [facility] by registering at the following website: [email or web address]. By joining [facility]’s FollowMyHealth portal, you will also be able to view your health information using other applications (apps) compatible with our system.

## 2024-12-08 NOTE — DISCHARGE NOTE PROVIDER - NSDCFUADDINST_GEN_ALL_CORE_FT
PAIN CONTROL: You may take 650 mg of Tylenol every 4-6 hours. Do not exceed more than 4000mg or 4 grams of Tylenol daily. You may take Tylenol and Ibuprofen as needed for pain. If you were prescribed narcotic pain medication, take as directed. You should also take senna to prevent constipation.    ANTIBIOTICS: Please complete the course of antibiotics sent your pharmacy as instructed.    BATHING: Please do not submerge wound underwater. You may shower and/or sponge bathe.    ACTIVITY: No heavy lifting or straining. Otherwise, you may return to your usual level of physical activity. If you are taking narcotic pain medications (such as oxycodone), do NOT drive a car, operate machinery or make important decisions.    DIET: Return to your usual diet    NOTIFY YOUR SURGEON IF: You have any bleeding that does not stop, any fevers (over 100.4F) or chills, persistent nausea/vomiting, persistent diarrhea, your pain is not controlled on your discharge pain medications, heavy bleeding in the urine, inability to urinate, or other worrisome symptoms arise.    FOLLOW UP:  1. Please call your surgeon to make a follow up appointment within two weeks of discharge  2: Please follow up with your primary care physician within 1-2 weeks regarding your hospitalization.

## 2024-12-08 NOTE — DISCHARGE NOTE PROVIDER - NSDCCPCAREPLAN_GEN_ALL_CORE_FT
PRINCIPAL DISCHARGE DIAGNOSIS  Diagnosis: Kidney stone  Assessment and Plan of Treatment: you had a left nephroureteral catheter tube. Please empty the drainage bag when full. Please complete your course of antibiotics. Keep well hydrated. Call the office if you have fever greater than 101, difficulty urinating, pain not relieved with pain medication, nausea/vomiting or other worrisome symptoms arise.      SECONDARY DISCHARGE DIAGNOSES  Diagnosis: HTN (hypertension)  Assessment and Plan of Treatment: continue antihypertensives and follow up with primary care doctor    Diagnosis: DM (diabetes mellitus)  Assessment and Plan of Treatment: Continue diabetes home regimen and follow up with primary care doctor

## 2024-12-09 LAB
CULTURE RESULTS: NO GROWTH — SIGNIFICANT CHANGE UP
SPECIMEN SOURCE: SIGNIFICANT CHANGE UP

## 2024-12-10 PROBLEM — N20.0 CALCULUS OF KIDNEY: Chronic | Status: ACTIVE | Noted: 2024-11-15

## 2024-12-11 ENCOUNTER — EMERGENCY (EMERGENCY)
Facility: HOSPITAL | Age: 64
LOS: 1 days | Discharge: HOME | End: 2024-12-11
Attending: EMERGENCY MEDICINE
Payer: COMMERCIAL

## 2024-12-11 VITALS
HEART RATE: 93 BPM | RESPIRATION RATE: 18 BRPM | OXYGEN SATURATION: 97 % | DIASTOLIC BLOOD PRESSURE: 82 MMHG | SYSTOLIC BLOOD PRESSURE: 190 MMHG | TEMPERATURE: 98 F

## 2024-12-11 VITALS
HEART RATE: 103 BPM | RESPIRATION RATE: 20 BRPM | HEIGHT: 69 IN | SYSTOLIC BLOOD PRESSURE: 185 MMHG | WEIGHT: 214.95 LBS | OXYGEN SATURATION: 98 % | TEMPERATURE: 98 F | DIASTOLIC BLOOD PRESSURE: 106 MMHG

## 2024-12-11 DIAGNOSIS — Z98.890 OTHER SPECIFIED POSTPROCEDURAL STATES: Chronic | ICD-10-CM

## 2024-12-11 PROCEDURE — 99285 EMERGENCY DEPT VISIT HI MDM: CPT

## 2024-12-11 PROCEDURE — 99283 EMERGENCY DEPT VISIT LOW MDM: CPT

## 2024-12-11 PROCEDURE — 99284 EMERGENCY DEPT VISIT MOD MDM: CPT

## 2024-12-11 RX ORDER — ATENOLOL 100 MG/1
50 TABLET ORAL ONCE
Refills: 0 | Status: COMPLETED | OUTPATIENT
Start: 2024-12-11 | End: 2024-12-11

## 2024-12-11 RX ORDER — AMLODIPINE BESYLATE 10 MG/1
5 TABLET ORAL ONCE
Refills: 0 | Status: COMPLETED | OUTPATIENT
Start: 2024-12-11 | End: 2024-12-11

## 2024-12-11 RX ORDER — LOSARTAN POTASSIUM 100 MG/1
25 TABLET, FILM COATED ORAL ONCE
Refills: 0 | Status: COMPLETED | OUTPATIENT
Start: 2024-12-11 | End: 2024-12-11

## 2024-12-11 RX ADMIN — AMLODIPINE BESYLATE 5 MILLIGRAM(S): 10 TABLET ORAL at 15:29

## 2024-12-11 RX ADMIN — ATENOLOL 50 MILLIGRAM(S): 100 TABLET ORAL at 15:29

## 2024-12-11 RX ADMIN — LOSARTAN POTASSIUM 25 MILLIGRAM(S): 100 TABLET, FILM COATED ORAL at 15:30

## 2024-12-11 NOTE — ED ADULT NURSE NOTE - OBJECTIVE STATEMENT
Patient is a 64y male presenting to the ED ambulatory from home with c/o drainage from nephrostomy site. Patient A&Ox4. Patient is speaking coherently in full sentences. Patient reports having drainage from left sided nephrostomy tube starting last night. Reports changing the dressing this morning and was concerned because of how wet the dressing was. Also reports pain and tenderness around the nephrostomy site. Respirations spontaneous, even, and non-labored. Abdomen soft, non-distended and non-tender upon palpation.

## 2024-12-11 NOTE — ED PROVIDER NOTE - PROGRESS NOTE DETAILS
Subhash, PGY4: Urology bedside evaluating leakage from tube catheter.  WATSON Childress will speak with Dr. Reno for official urology recommendations.

## 2024-12-11 NOTE — ED PROVIDER NOTE - PHYSICAL EXAMINATION
GENERAL: Awake, alert, NAD  HEENT: NC/AT, moist mucous membranes, EOMI  LUNGS: CTAB, no wheezes or crackles   CARDIAC: RRR, no m/r/g  ABDOMEN: Soft, suprapubic tenderness to palpation.  non distended, no rebound, no guarding  BACK: No midline spinal tenderness, mild left CVA tenderness with oozing clear urine from site of tube.  EXT: No edema, no calf tenderness, no deformities.  NEURO: A&Ox3. Moving all extremities.  SKIN: Warm and dry. No rash.  PSYCH: Normal affect.

## 2024-12-11 NOTE — CONSULT NOTE ADULT - SUBJECTIVE AND OBJECTIVE BOX
UROLOGY CONSULT NOTE    HPI:  This is a 64y old Male with PMHx of DM2, CKD, TIA, HTN, BPH, bladder stones, L ureteral stones s/p aborted L PCNL, L nephroureteral tube (PCNU) placement 12/6 who presents with increased drainage of urine around his L PCNU today.  Patient states he flushes the PCNU with 3cc of normal saline 1-2x a day when he sees it stop draining.  This morning, he noticed heavy drainage into the dressing and came to the ER.  He denies any fevers, chills, N/V, flank pain, dysuria.  Currently pain controlled.  In the ER, the PCNU was noticed to be draining but was flushed with some sediment returned.  PVR-327cc.      PAST MEDICAL HISTORY    HTN (hypertension)    DM (diabetes mellitus)    History of TIAs    BPH (benign prostatic hyperplasia)    Kidney stone        PAST SURGICAL HISTORY    History of hernia repair        FAMILY HISTORY    No pertinent family history in first degree relatives        SOCIAL HISTORY        HOME MEDICATIONS    acetaminophen 325 mg oral tablet: 3 tab(s) orally every 6 hours (08 Dec 2024 10:23)  amLODIPine 5 mg oral tablet: 1 tab(s) orally once a day (08 Dec 2024 10:23)  atenolol 50 mg oral tablet: 1 tab(s) orally once a day (06 Dec 2024 07:46)  Flomax 0.4 mg oral capsule: 1 cap(s) orally 2 times a day (06 Dec 2024 07:46)  glipiZIDE 10 mg oral tablet, extended release: 1 tab(s) orally once a day (06 Dec 2024 07:46)  losartan 25 mg oral tablet: 1 tab(s) orally once a day (06 Dec 2024 15:52)      DRUG ALLERGIES    penicillin (Chills)      REVIEW OF SYSTEMS: Pertinent positives and negatives as stated in HPI, otherwise negative      VITAL SIGNS    T(F): 98, Max: 98.5 (12-11-24 @ 10:25)  HR: 93  BP: 190/82  RR: 18  SpO2: 97%    I's & O's        PHYSICAL EXAM    Gen: Well groomed, well dressed, well nourished  Abd: Soft, NT/ND  Back: +intermittent clear urine drainage around the L PCNU; clear urine seen in in drainage bag.  No swelling or ecchymosis noted.  +tenderness at the incision site  Ext: No edema present b/l     UROLOGY CONSULT NOTE    HPI:  This is a 64y old Male with PMHx of DM2, CKD, TIA, HTN, BPH, bladder stones, L ureteral stones s/p aborted L PCNL, L nephroureteral tube (PCNU) placement 12/6 who presents with increased drainage of urine around his L PCNU today.  Patient states he flushes the PCNU with 3cc of normal saline 1-2x a day when he sees it stop draining.  This morning, he noticed heavy drainage into the dressing and came to the ER.  He denies any fevers, chills, N/V, flank pain, dysuria.  Currently pain controlled.  In the ER, the PCNU was noticed to be draining but was flushed with some sediment returned.  PVR-327cc.      PAST MEDICAL HISTORY    HTN (hypertension)    DM (diabetes mellitus)    History of TIAs    BPH (benign prostatic hyperplasia)    Kidney stone        PAST SURGICAL HISTORY    History of hernia repair        FAMILY HISTORY    No pertinent family history in first degree relatives        SOCIAL HISTORY    no smoking history      HOME MEDICATIONS    acetaminophen 325 mg oral tablet: 3 tab(s) orally every 6 hours (08 Dec 2024 10:23)  amLODIPine 5 mg oral tablet: 1 tab(s) orally once a day (08 Dec 2024 10:23)  atenolol 50 mg oral tablet: 1 tab(s) orally once a day (06 Dec 2024 07:46)  Flomax 0.4 mg oral capsule: 1 cap(s) orally 2 times a day (06 Dec 2024 07:46)  glipiZIDE 10 mg oral tablet, extended release: 1 tab(s) orally once a day (06 Dec 2024 07:46)  losartan 25 mg oral tablet: 1 tab(s) orally once a day (06 Dec 2024 15:52)      DRUG ALLERGIES    penicillin (Chills)      REVIEW OF SYSTEMS: Pertinent positives and negatives as stated in HPI, otherwise negative      VITAL SIGNS    T(F): 98, Max: 98.5 (12-11-24 @ 10:25)  HR: 93  BP: 190/82  RR: 18  SpO2: 97%    I's & O's        PHYSICAL EXAM    Gen: Well groomed, well dressed, well nourished  Abd: Soft, NT/ND  Back: +intermittent clear urine drainage around the L PCNU; clear urine seen in in drainage bag.  No swelling or ecchymosis noted.  +tenderness at the incision site  Ext: No edema present b/l

## 2024-12-11 NOTE — ED PROVIDER NOTE - CLINICAL SUMMARY MEDICAL DECISION MAKING FREE TEXT BOX
64-year-old male patient past medical history of BPH, nephrolithiasis, diabetes, hypertension who presents to the emergency department for leakage from left nephroureteral catheter since this a.m.  Patient had catheter placed on 12/6 for infected kidney stone.  He has been compliant with ciprofloxacin prescription since.  He denies nausea, vomiting, abdominal pain, pain over site of catheter, changes in urine collected from catheter or any other complaints.  On exam, found with oozing of urine around site of the catheter when pressing skin of left flank.  Of note, on exam found with tenderness to suprapubic aspect of the abdomen.  Will consult urology for possible complication after recent urologic procedure.  Will draw 2 urine cultures and UAs for concerns of an infection.  And will also order labs and imaging to evaluate placement/possible dislodgment of tube.  Patient deferring pain medication.

## 2024-12-11 NOTE — ED PROVIDER NOTE - IV ALTEPLASE DOOR HIDDEN
show Detail Level: Zone Photo Preface (Leave Blank If You Do Not Want): Photographs were obtained today

## 2024-12-11 NOTE — ED PROVIDER NOTE - CCCP TRG CHIEF CMPLNT
Megan August  [phone] (home) [phone] (work)    Megan called requesting that you send an order for STAT PET scan (head to toe). She is supposed to get one every 6 months and she is a month late. Also, she has to stay in a hotel the night after and not be around her kids and her  is off this week and could care for her kids. Please advise.   leaking at left nephrostomy site

## 2024-12-11 NOTE — ED PROVIDER NOTE - PATIENT PORTAL LINK FT
You can access the FollowMyHealth Patient Portal offered by [facility] by registering at the following website: [email or web address]. By joining [facility]’s FollowMyHealth portal, you will also be able to view your health information using other applications (apps) compatible with our system.

## 2024-12-11 NOTE — ED PROVIDER NOTE - NSICDXPASTMEDICALHX_GEN_ALL_CORE_FT
PAST MEDICAL HISTORY:  BPH (benign prostatic hyperplasia)     DM (diabetes mellitus)     History of TIAs     HTN (hypertension)     Kidney stone

## 2024-12-11 NOTE — CONSULT NOTE ADULT - ASSESSMENT
64 year old male with increased drainage around L PCNU; pt is s/p aborted L PCNL and L PCNU placement 12/6    -new dressing applied with abd pad reinforcement; urostomy not used since patient must flush the tube regularly  -pt to continue flushing the PCNU when there is no drainage or if there is increased drainage around it  -continue antibiotics  -analgesia as needed  -office to arrange with pt a date for L PCNL  -case d/w Dr Reno

## 2024-12-11 NOTE — ED PROVIDER NOTE - ATTENDING CONTRIBUTION TO CARE
Dr. Bill: I have personally performed a face to face bedside history and physical examination of this patient. I have discussed the history, examination, review of systems, assessment and plan of management with the fellow. I have reviewed the electronic medical record and amended it to reflect my history, review of systems, physical exam, assessment and plan.    Dr. Bill: 64-year-old male history of diabetes, CKD, TIA, hypertension, BPH, kidney stones, status post left percutaneous nephroureteral tube placement 5 days ago, noted to have a UTI, started on Cipro which he is taking, and culture reviewed and was negative, here today complaining of urine leaking around nephrostomy tube and suprapubic pain.  Denies fevers, chills, nausea, vomiting, constipation, diarrhea.  Spoke to the urologist nurse and was told to come into the emergency department.    Gen: No acute distress  HEENT: Mucous membranes moist, pink conjunctivae, EOMI  CV: RRR, no clubbing/cyanosis/edema  Resp: CTAB  GI: Abdomen soft, no abdominal tenderness to palpation  : No CVAT, left nephrostomy tube in place with clear urine draining from stoma  Neuro: A&O x 3, moving all 4 extremities  MSK: No spine or joint tenderness to palpation  Skin: No rashes    Well-appearing patient status post recent percutaneous nephrostomy on Cipro presenting with urine leaking from around the tube.  Will discuss with urology regarding management.    D/w urology, no labs, ua, CT needed, recommending bladder scan, will obtain and urology to see pt in the ED.

## 2024-12-11 NOTE — ED PROVIDER NOTE - NSFOLLOWUPINSTRUCTIONS_ED_ALL_ED_FT
Please follow-up with your urologist for further management.    Continue all home medications.      Please return to the emergency department if you experience persistent complications (no drainage, bleeding) with your nephrostomy tube.

## 2024-12-13 ENCOUNTER — OUTPATIENT (OUTPATIENT)
Dept: OUTPATIENT SERVICES | Facility: HOSPITAL | Age: 64
LOS: 1 days | End: 2024-12-13
Payer: COMMERCIAL

## 2024-12-13 ENCOUNTER — EMERGENCY (EMERGENCY)
Facility: HOSPITAL | Age: 64
LOS: 0 days | Discharge: HOME | End: 2024-12-13
Attending: STUDENT IN AN ORGANIZED HEALTH CARE EDUCATION/TRAINING PROGRAM
Payer: MEDICARE

## 2024-12-13 VITALS
DIASTOLIC BLOOD PRESSURE: 94 MMHG | HEIGHT: 69 IN | SYSTOLIC BLOOD PRESSURE: 169 MMHG | OXYGEN SATURATION: 100 % | WEIGHT: 214.95 LBS | HEART RATE: 88 BPM | RESPIRATION RATE: 16 BRPM | TEMPERATURE: 98 F

## 2024-12-13 VITALS
RESPIRATION RATE: 18 BRPM | HEART RATE: 81 BPM | DIASTOLIC BLOOD PRESSURE: 79 MMHG | OXYGEN SATURATION: 98 % | TEMPERATURE: 98 F | SYSTOLIC BLOOD PRESSURE: 163 MMHG

## 2024-12-13 VITALS
DIASTOLIC BLOOD PRESSURE: 81 MMHG | HEIGHT: 69 IN | OXYGEN SATURATION: 99 % | RESPIRATION RATE: 16 BRPM | WEIGHT: 214.95 LBS | HEART RATE: 73 BPM | SYSTOLIC BLOOD PRESSURE: 155 MMHG | TEMPERATURE: 98 F

## 2024-12-13 DIAGNOSIS — T83.092A OTHER MECHANICAL COMPLICATION OF NEPHROSTOMY CATHETER, INITIAL ENCOUNTER: ICD-10-CM

## 2024-12-13 DIAGNOSIS — Z98.890 OTHER SPECIFIED POSTPROCEDURAL STATES: Chronic | ICD-10-CM

## 2024-12-13 DIAGNOSIS — Z01.818 ENCOUNTER FOR OTHER PREPROCEDURAL EXAMINATION: ICD-10-CM

## 2024-12-13 DIAGNOSIS — N20.0 CALCULUS OF KIDNEY: ICD-10-CM

## 2024-12-13 DIAGNOSIS — N20.1 CALCULUS OF URETER: ICD-10-CM

## 2024-12-13 DIAGNOSIS — E11.9 TYPE 2 DIABETES MELLITUS WITHOUT COMPLICATIONS: ICD-10-CM

## 2024-12-13 DIAGNOSIS — I10 ESSENTIAL (PRIMARY) HYPERTENSION: ICD-10-CM

## 2024-12-13 DIAGNOSIS — Z87.442 PERSONAL HISTORY OF URINARY CALCULI: ICD-10-CM

## 2024-12-13 LAB
ALBUMIN SERPL ELPH-MCNC: 2.8 G/DL — LOW (ref 3.3–5)
ALP SERPL-CCNC: 93 U/L — SIGNIFICANT CHANGE UP (ref 40–120)
ALT FLD-CCNC: 25 U/L — SIGNIFICANT CHANGE UP (ref 12–78)
ANION GAP SERPL CALC-SCNC: 7 MMOL/L — SIGNIFICANT CHANGE UP (ref 5–17)
ANION GAP SERPL CALC-SCNC: 8 MMOL/L — SIGNIFICANT CHANGE UP (ref 5–17)
APPEARANCE UR: ABNORMAL
AST SERPL-CCNC: 14 U/L — LOW (ref 15–37)
BACTERIA # UR AUTO: ABNORMAL /HPF
BASOPHILS # BLD AUTO: 0.03 K/UL — SIGNIFICANT CHANGE UP (ref 0–0.2)
BASOPHILS NFR BLD AUTO: 0.4 % — SIGNIFICANT CHANGE UP (ref 0–2)
BILIRUB SERPL-MCNC: 0.2 MG/DL — SIGNIFICANT CHANGE UP (ref 0.2–1.2)
BILIRUB UR-MCNC: NEGATIVE — SIGNIFICANT CHANGE UP
BLD GP AB SCN SERPL QL: NEGATIVE — SIGNIFICANT CHANGE UP
BUN SERPL-MCNC: 27 MG/DL — HIGH (ref 7–23)
BUN SERPL-MCNC: 30 MG/DL — HIGH (ref 7–23)
CALCIUM SERPL-MCNC: 8.9 MG/DL — SIGNIFICANT CHANGE UP (ref 8.5–10.1)
CALCIUM SERPL-MCNC: 9 MG/DL — SIGNIFICANT CHANGE UP (ref 8.5–10.1)
CHLORIDE SERPL-SCNC: 102 MMOL/L — SIGNIFICANT CHANGE UP (ref 96–108)
CHLORIDE SERPL-SCNC: 99 MMOL/L — SIGNIFICANT CHANGE UP (ref 96–108)
CO2 SERPL-SCNC: 25 MMOL/L — SIGNIFICANT CHANGE UP (ref 22–31)
CO2 SERPL-SCNC: 27 MMOL/L — SIGNIFICANT CHANGE UP (ref 22–31)
COLOR SPEC: YELLOW — SIGNIFICANT CHANGE UP
CREAT SERPL-MCNC: 2.48 MG/DL — HIGH (ref 0.5–1.3)
CREAT SERPL-MCNC: 2.83 MG/DL — HIGH (ref 0.5–1.3)
DIFF PNL FLD: ABNORMAL
EGFR: 24 ML/MIN/1.73M2 — LOW
EGFR: 28 ML/MIN/1.73M2 — LOW
EOSINOPHIL # BLD AUTO: 0.31 K/UL — SIGNIFICANT CHANGE UP (ref 0–0.5)
EOSINOPHIL NFR BLD AUTO: 3.9 % — SIGNIFICANT CHANGE UP (ref 0–6)
GLUCOSE SERPL-MCNC: 337 MG/DL — HIGH (ref 70–99)
GLUCOSE SERPL-MCNC: 398 MG/DL — HIGH (ref 70–99)
GLUCOSE UR QL: >=1000 MG/DL
HCT VFR BLD CALC: 32.7 % — LOW (ref 39–50)
HGB BLD-MCNC: 10.3 G/DL — LOW (ref 13–17)
IMM GRANULOCYTES NFR BLD AUTO: 0.4 % — SIGNIFICANT CHANGE UP (ref 0–0.9)
KETONES UR-MCNC: NEGATIVE MG/DL — SIGNIFICANT CHANGE UP
LEUKOCYTE ESTERASE UR-ACNC: ABNORMAL
LYMPHOCYTES # BLD AUTO: 1.21 K/UL — SIGNIFICANT CHANGE UP (ref 1–3.3)
LYMPHOCYTES # BLD AUTO: 15.4 % — SIGNIFICANT CHANGE UP (ref 13–44)
MCHC RBC-ENTMCNC: 25.3 PG — LOW (ref 27–34)
MCHC RBC-ENTMCNC: 31.5 G/DL — LOW (ref 32–36)
MCV RBC AUTO: 80.3 FL — SIGNIFICANT CHANGE UP (ref 80–100)
MONOCYTES # BLD AUTO: 0.62 K/UL — SIGNIFICANT CHANGE UP (ref 0–0.9)
MONOCYTES NFR BLD AUTO: 7.9 % — SIGNIFICANT CHANGE UP (ref 2–14)
NEUTROPHILS # BLD AUTO: 5.67 K/UL — SIGNIFICANT CHANGE UP (ref 1.8–7.4)
NEUTROPHILS NFR BLD AUTO: 72 % — SIGNIFICANT CHANGE UP (ref 43–77)
NITRITE UR-MCNC: NEGATIVE — SIGNIFICANT CHANGE UP
NRBC # BLD: 0 /100 WBCS — SIGNIFICANT CHANGE UP (ref 0–0)
PH UR: 7 — SIGNIFICANT CHANGE UP (ref 5–8)
PLATELET # BLD AUTO: 432 K/UL — HIGH (ref 150–400)
POTASSIUM SERPL-MCNC: 4.2 MMOL/L — SIGNIFICANT CHANGE UP (ref 3.5–5.3)
POTASSIUM SERPL-MCNC: 4.3 MMOL/L — SIGNIFICANT CHANGE UP (ref 3.5–5.3)
POTASSIUM SERPL-SCNC: 4.2 MMOL/L — SIGNIFICANT CHANGE UP (ref 3.5–5.3)
POTASSIUM SERPL-SCNC: 4.3 MMOL/L — SIGNIFICANT CHANGE UP (ref 3.5–5.3)
PROT SERPL-MCNC: 8.1 GM/DL — SIGNIFICANT CHANGE UP (ref 6–8.3)
PROT UR-MCNC: 30 MG/DL
RBC # BLD: 4.07 M/UL — LOW (ref 4.2–5.8)
RBC # FLD: 14.3 % — SIGNIFICANT CHANGE UP (ref 10.3–14.5)
RBC CASTS # UR COMP ASSIST: 0 /HPF — SIGNIFICANT CHANGE UP (ref 0–4)
RH IG SCN BLD-IMP: POSITIVE — SIGNIFICANT CHANGE UP
SODIUM SERPL-SCNC: 134 MMOL/L — LOW (ref 135–145)
SODIUM SERPL-SCNC: 134 MMOL/L — LOW (ref 135–145)
SP GR SPEC: 1.01 — SIGNIFICANT CHANGE UP (ref 1–1.03)
UROBILINOGEN FLD QL: 0.2 MG/DL — SIGNIFICANT CHANGE UP (ref 0.2–1)
WBC # BLD: 7.87 K/UL — SIGNIFICANT CHANGE UP (ref 3.8–10.5)
WBC # FLD AUTO: 7.87 K/UL — SIGNIFICANT CHANGE UP (ref 3.8–10.5)
WBC UR QL: ABNORMAL /HPF (ref 0–5)

## 2024-12-13 PROCEDURE — 86901 BLOOD TYPING SEROLOGIC RH(D): CPT

## 2024-12-13 PROCEDURE — 86850 RBC ANTIBODY SCREEN: CPT

## 2024-12-13 PROCEDURE — 99285 EMERGENCY DEPT VISIT HI MDM: CPT

## 2024-12-13 PROCEDURE — 74018 RADEX ABDOMEN 1 VIEW: CPT | Mod: 26

## 2024-12-13 PROCEDURE — 74176 CT ABD & PELVIS W/O CONTRAST: CPT | Mod: 26,MC

## 2024-12-13 PROCEDURE — 86900 BLOOD TYPING SEROLOGIC ABO: CPT

## 2024-12-13 RX ORDER — OXYCODONE AND ACETAMINOPHEN 5; 325 MG/1; MG/1
1 TABLET ORAL
Qty: 15 | Refills: 0
Start: 2024-12-13 | End: 2024-12-17

## 2024-12-13 RX ORDER — SODIUM CHLORIDE 9 MG/ML
1000 INJECTION, SOLUTION INTRAMUSCULAR; INTRAVENOUS; SUBCUTANEOUS ONCE
Refills: 0 | Status: COMPLETED | OUTPATIENT
Start: 2024-12-13 | End: 2024-12-13

## 2024-12-13 RX ORDER — OXYCODONE AND ACETAMINOPHEN 5; 325 MG/1; MG/1
1 TABLET ORAL ONCE
Refills: 0 | Status: DISCONTINUED | OUTPATIENT
Start: 2024-12-13 | End: 2024-12-13

## 2024-12-13 RX ORDER — ACETAMINOPHEN 500MG 500 MG/1
1000 TABLET, COATED ORAL ONCE
Refills: 0 | Status: COMPLETED | OUTPATIENT
Start: 2024-12-13 | End: 2024-12-13

## 2024-12-13 RX ADMIN — SODIUM CHLORIDE 1000 MILLILITER(S): 9 INJECTION, SOLUTION INTRAMUSCULAR; INTRAVENOUS; SUBCUTANEOUS at 03:58

## 2024-12-13 RX ADMIN — ACETAMINOPHEN 500MG 400 MILLIGRAM(S): 500 TABLET, COATED ORAL at 02:35

## 2024-12-13 RX ADMIN — SODIUM CHLORIDE 1000 MILLILITER(S): 9 INJECTION, SOLUTION INTRAMUSCULAR; INTRAVENOUS; SUBCUTANEOUS at 02:35

## 2024-12-13 NOTE — H&P PST ADULT - HISTORY OF COVID-19 VACCINATION
History     Chief Complaint   Patient presents with     Foreign Body in Eye     lt eye     HPI  Bucky Franco is a 42 year old male who was cleaning out a barn this AM and got debris blown into his face. Noted his left eye felt scratchy, but not terribly painful. Sister saw him later and commented on the genevieve in his eye. Slowly becoming more painful and decided to be seen. No change in vision other than blurry due to tearing. No photophobia. No headache.     Allergies:  No Known Allergies    Problem List:    Patient Active Problem List    Diagnosis Date Noted     Encounter for diabetic foot exam (H) 10/31/2018     Priority: Medium     Benign essential hypertension 10/31/2018     Priority: Medium     Type 2 diabetes mellitus without complication, without long-term current use of insulin (H) 10/30/2018     Priority: Medium     Morbid obesity (H) 10/11/2018     Priority: Medium     Headache 03/31/2015     Priority: Medium     Problem list name updated by automated process. Provider to review       Sinus headache 03/13/2015     Priority: Medium     Somatic dysfunction of cervical region 03/13/2015     Priority: Medium     Health examination of defined subpopulation 05/03/2013     Priority: Medium        Past Medical History:    Past Medical History:   Diagnosis Date     GERD (gastroesophageal reflux disease) 05/18/2012     Gout, unspecified 09/07/2005     Hyperlipidemia 05/18/2012       Past Surgical History:    Past Surgical History:   Procedure Laterality Date     ADENOIDECTOMY       APPENDECTOMY       CIRCUMCISION       lap band       Onychomycoses       TONSILLECTOMY         Family History:    Family History   Problem Relation Age of Onset     Hypertension Mother      Rheumatoid Arthritis Mother      Hyperlipidemia Father      Myocardial Infarction Paternal Grandfather      Diabetes Other      Asthma No family hx of        Social History:  Marital Status:  Single [1]  Social History     Tobacco Use     Smoking  status: Never Smoker     Smokeless tobacco: Former User     Types: Snuff   Substance Use Topics     Alcohol use: Yes     Comment: Rarely     Drug use: No        Medications:    aspirin 81 MG tablet  diltiazem ER (TIAZAC) 240 MG 24 hr ER beaded capsule  diltiazem ER COATED BEADS (CARDIZEM LA) 120 MG 24 hr tablet  erythromycin (ROMYCIN) 5 MG/GM ophthalmic ointment  fluticasone (FLONASE) 50 MCG/ACT spray  glimepiride (AMARYL) 2 MG tablet  lisinopril (ZESTRIL) 40 MG tablet  metFORMIN (GLUCOPHAGE-XR) 500 MG 24 hr tablet  rosuvastatin (CRESTOR) 10 MG tablet          Review of Systems   Constitutional: Negative for chills and fever.   HENT: Positive for rhinorrhea.    Eyes: Positive for pain, redness and visual disturbance. Negative for photophobia and discharge.   Respiratory: Negative for cough and shortness of breath.    Neurological: Negative for headaches.       Physical Exam   BP: (!) 186/106  Heart Rate: 69  Temp: 98.5  F (36.9  C)  Resp: 14  SpO2: 96 %      Physical Exam  Constitutional:       Appearance: Normal appearance.   HENT:      Nose: Rhinorrhea present.   Eyes:      Extraocular Movements: Extraocular movements intact.      Left eye: Normal extraocular motion.      Conjunctiva/sclera:      Left eye: Left conjunctiva is injected. No exudate.     Pupils: Pupils are equal, round, and reactive to light.     Neurological:      Mental Status: He is alert.         ED Course        Procedures               Critical Care time:  none  (T15.02XA) Foreign body of left cornea, initial encounter  - Unable to extract lesion with q tip in the UC. Advised abx ointment and Ophthalmology consultation in the AM for removal.     (I10) Essential hypertension  Increase lisinopril to 40mg daily due to microalbuminuria and hypertension. BP markedly higher than usual today, likely due to anxiety and eye pain, however, borderline in the past. Follow up with PCP for bp and lab check in one month.  No results found for this or any  previous visit (from the past 24 hour(s)).    Medications   tetracaine (PONTOCAINE) 0.5 % ophthalmic solution 2 drop (2 drops Left Eye Given 8/3/20 1813)       Assessments & Plan (with Medical Decision Making)     I have reviewed the nursing notes.    I have reviewed the findings, diagnosis, plan and need for follow up with the patient.       Discharge Medication List as of 8/3/2020  6:39 PM      START taking these medications    Details   erythromycin (ROMYCIN) 5 MG/GM ophthalmic ointment Place 0.5 inches Into the left eye 4 times dailyDisp-1 g,[id number]             Final diagnoses:   Foreign body of left cornea, initial encounter   Essential hypertension       8/3/2020   [facility]   Yes

## 2024-12-13 NOTE — ED PROVIDER NOTE - CONSIDERATION OF ADMISSION OBSERVATION
Consideration of Admission/Observation considered admission if blockage in nephrostomy tube, worsening kidney function or pain uncontrolled

## 2024-12-13 NOTE — ED PROVIDER NOTE - OBJECTIVE STATEMENT
64 M pmh HTN, DM, renal stone w/ recent percutaneous nephrostomy tube placed presenting to the ED for pain at nephrostomy tube site. Pt states that he was seen in the ED yesterday and had his dressings changed but he was concerned that the tube may be kinked. He states that there is still urine output from his bag along with pressure around site. Pt states that he has surgery scheduled in 5 days

## 2024-12-13 NOTE — ED PROVIDER NOTE - CLINICAL SUMMARY MEDICAL DECISION MAKING FREE TEXT BOX
64 M pmh HTN, DM, renal stone w/ L PCN placed presenting to the ED with complaints of pain and pressure around procedural site    concern for clogged tube, infection, kink  labs  XR, CT  UA  pain control 64 M pmh HTN, DM, renal stone w/ L PCN placed presenting to the ED with complaints of pain and pressure around procedural site    concern for clogged tube, infection, kink  labs  XR, CT  UA  pain control  clear urine present from nephrostomy tube  patient with outpatient follow-up and scheduled surgery in 5 days w/ Dr Reno

## 2024-12-13 NOTE — H&P PST ADULT - NSICDXPROCEDURE_GEN_ALL_CORE_FT
PROCEDURES:  Cystoscopy with percutaneous nephrolithotomy (PCNL) and balloon occlusion of ureter 13-Dec-2024 16:31:28  Talya Villanueva

## 2024-12-13 NOTE — ED ADULT NURSE NOTE - NSFALLUNIVINTERV_ED_ALL_ED
Bed/Stretcher in lowest position, wheels locked, appropriate side rails in place/Call bell, personal items and telephone in reach/Instruct patient to call for assistance before getting out of bed/chair/stretcher/Non-slip footwear applied when patient is off stretcher/[place] to call system/Physically safe environment - no spills, clutter or unnecessary equipment/Purposeful proactive rounding/Room/bathroom lighting operational, light cord in reach

## 2024-12-13 NOTE — ED PROVIDER NOTE - PATIENT PORTAL LINK FT
You can access the FollowMyHealth Patient Portal offered by [facility] by registering at the following website: [email or web address]. By joining [facility]’s FollowMyHealth portal, you will also be able to view your health information using other applications (apps) compatible with our system.

## 2024-12-13 NOTE — ED PROVIDER NOTE - WR ORDER ID 1
2150 hrs. cta reported accucheck of 51 this nurse repeated BS results 47 pt. Stated she felt weak ,this nurse provided orange juice crackers,pushed D50 IV ,BS repeat 158.   made aware.    0500 hrs. BS taken pt. c/o feeling weak BS 27 ,pushed D50 IV given crackers ,orange juice repeat   made aware no new orders at this time.   [id number]

## 2024-12-13 NOTE — ED ADULT NURSE NOTE - OBJECTIVE STATEMENT
64Y A&OX3 M PMH HTN, DM, renal stone HTN, DM, renal stone with recent L nephrostomy tube placed c/o pain at  nephrostomy tube side. reports being seen in ED yesterday where he had dressing changed. states he believes the tube may be kinked. scheduled to receive surgery for renal stone on monday

## 2024-12-13 NOTE — ED ADULT TRIAGE NOTE - CHIEF COMPLAINT QUOTE
BIBA, nephrostomy bag clogged.  pt had surgery (kidney stone) on 12/6 at [facility].  300ml in catheter bag.  c/o pain at surgical area

## 2024-12-13 NOTE — ED PROVIDER NOTE - NSFOLLOWUPINSTRUCTIONS_ED_ALL_ED_FT
You were seen in the hospital for concerns with your nephrostomy tube. Your tube is in the right place and not kinked. Please continue to monitor your urine output from the bag You were seen in the hospital for concerns with your nephrostomy tube. Your tube is in the right place and not kinked. Please continue to monitor your urine output from the bag. Call your urologist or return to the ED if you have worsening pain, bloody urine, or no urine drainage from bag after flushing

## 2024-12-13 NOTE — H&P PST ADULT - NSICDXPASTSURGICALHX_GEN_ALL_CORE_FT
PAST SURGICAL HISTORY:  H/O nephrolithotomy with removal of calculi     History of hernia repair

## 2024-12-13 NOTE — ED ADULT NURSE NOTE - NS ED NURSE LEVEL OF CONSCIOUSNESS SPEECH
Noted. Please get above mentioned details regarding Saw. Ask his wife how often she is needing to miss work, does she want short term leave or intermittent FMLA - does he have specialty appointments that he needs or only follow up with Jolene? If so, patient will need to see Jolene before I can fill out any paperwork so that we know what follow up is needed as I cannot make those recommendations as I am not his provider.  Speaking Coherently

## 2024-12-13 NOTE — ED ADULT NURSE NOTE - CHIEF COMPLAINT QUOTE
BIBA, nephrostomy bag clogged.  pt had surgery (kidney stone) on 12/6 at [facility].  300ml in catheter bag.  c/o pain at surgical area

## 2024-12-13 NOTE — ED PROVIDER NOTE - PHYSICAL EXAMINATION
General: Well appearing male in no acute distress  HEENT: Normocephalic, atraumatic. Moist mucous membranes. Oropharynx clear. No lymphadenopathy.  Eyes: No scleral icterus. EOMI. NAZIA.  Neck:. Soft and supple. Full ROM without pain. No midline tenderness  Cardiac: Regular rate and regular rhythm. No murmurs, rubs, gallops. Peripheral pulses 2+ and symmetric. No LE edema.  Resp: Lungs CTAB. Speaking in full sentences. No wheezes, rales or rhonchi.  Abd: Soft, non-tender, non-distended. No guarding or rebound. L nephrostomy tube connected to bag in place with yellow urine drainage  Back: Spine midline and non-tender. No CVA tenderness.    Skin: No rashes, abrasions, or lacerations.  Neuro: AO x 3. Moves all extremities symmetrically. Motor strength and sensation grossly intact.

## 2024-12-13 NOTE — H&P PST ADULT - PROBLEM SELECTOR PLAN 1
Planned for left PCNL, ureteroscopy, laser lithotripsy and stent insertion on 12/13/24. Surgical instructions reviewed and provided to patient. T&S obtained at [facility].     Last dose of Glipizide: 12/16/24

## 2024-12-13 NOTE — H&P PST ADULT - NSICDXPASTMEDICALHX_GEN_ALL_CORE_FT
PAST MEDICAL HISTORY:  BPH (benign prostatic hyperplasia)     Calculus of kidney     DM (diabetes mellitus)     History of TIAs     HTN (hypertension)     Kidney stone

## 2024-12-13 NOTE — H&P PST ADULT - ASSESSMENT
Denies loose/cracked teeth, dentures/bridges  Mallampati II    CAPRINI SCORE    AGE RELATED RISK FACTORS                                                             [ ] Age 41-60 years                                            (1 Point)  [ ] Age: 61-74 years                                           (2 Points)                 [ ] Age= 75 years                                                (3 Points)             DISEASE RELATED RISK FACTORS                                                       [ ] Edema in the lower extremities                 (1 Point)                     [ ] Varicose veins                                               (1 Point)                                 [ ] BMI > 25 Kg/m2                                            (1 Point)                                  [ ] Serious infection (ie PNA)                            (1 Point)                     [ ] Lung disease ( COPD, Emphysema)            (1 Point)                                                                          [ ] Acute myocardial infarction                         (1 Point)                  [ ] Congestive heart failure (in the previous month)  (1 Point)         [ ] Inflammatory bowel disease                            (1 Point)                  [ ] Central venous access, PICC or Port               (2 points)       (within the last month)                                                                [ ] Stroke (in the previous month)                        (5 Points)    [ ] Previous or present malignancy                       (2 points)                                                                                                                                                         HEMATOLOGY RELATED FACTORS                                                         [ ] Prior episodes of VTE                                     (3 Points)                     [ ] Positive family history for VTE                      (3 Points)                  [ ] Prothrombin 90852 A                                     (3 Points)                     [ ] Factor V Leiden                                                (3 Points)                        [ ] Lupus anticoagulants                                      (3 Points)                                                           [ ] Anticardiolipin antibodies                              (3 Points)                                                       [ ] High homocysteine in the blood                   (3 Points)                                             [ ] Other congenital or acquired thrombophilia      (3 Points)                                                [ ] Heparin induced thrombocytopenia                  (3 Points)                                        MOBILITY RELATED FACTORS  [ ] Bed rest                                                         (1 Point)  [ ] Plaster cast                                                    (2 points)  [ ] Bed bound for more than 72 hours           (2 Points)    GENDER SPECIFIC FACTORS  [ ] Pregnancy or had a baby within the last month   (1 Point)  [ ] Post-partum < 6 weeks                                   (1 Point)  [ ] Hormonal therapy  or oral contraception   (1 Point)  [ ] History of pregnancy complications              (1 point)  [ ] Unexplained or recurrent              (1 Point)    OTHER RISK FACTORS                                           (1 Point)  [ ] BMI >40, smoking, diabetes requiring insulin, chemotherapy  blood transfusions and length of surgery over 2 hours    SURGERY RELATED RISK FACTORS  [ ]  Section within the last month     (1 Point)  [ ] Minor surgery                                                  (1 Point)  [ ] Arthroscopic surgery                                       (2 Points)  [ ] Planned major surgery lasting more            (2 Points)      than 45 minutes     [ ] Elective hip or knee joint replacement       (5 points)       surgery                                                TRAUMA RELATED RISK FACTORS  [ ] Fracture of the hip, pelvis, or leg                       (5 Points)  [ ] Spinal cord injury resulting in paralysis             (5 points)       (in the previous month)    [ ] Paralysis  (less than 1 month)                             (5 Points)  [ ] Multiple Trauma within 1 month                        (5 Points)    Total Score [        ]    Caprini Score 0-2: Low Risk, NO VTE prophylaxis required for most patients, encourage ambulation  Caprini Score 3-6: Moderate Risk , pharmacologic VTE prophylaxis is indicated for most patients (in the absence of contraindications)  Caprini Score Greater than or =7: High risk, pharmocologic VTE prophylaxis indicated for most patients (in the absence of contraindications)

## 2024-12-13 NOTE — H&P PST ADULT - HISTORY OF PRESENT ILLNESS
63 y/o M with h/o HTN, T2DM (manage by PCP) , BPH, Nephrolithiasis (2022, passed on his own) c/o left flank pain hematuria, and dysuria s/p ER visit  CT demonstrated a 1.6 cm calculus in the proximal left ureter resulting in moderate left hydroureteronephrosis, Today he presets to PST for scheduled Left Percutaneous Nephrolithotomy Left Uroscopy Laser Lithotripsy Left Stent Insertion on 12/17/24. Of note, patient was previously scheduled for this procedure on 12/6/24 but procedure was aborted 2/2 pus on access, patient is s/p nephrolithotomy tube and is currently on antibiotics. Denies any palpitations, SOB, N/V, fever or chills. Of note, patient presented to [facility] ED today for saturation of nephrolithotomy dressing, labs were obtained and site was re-dressed, now C/D/I.

## 2024-12-14 LAB
CULTURE RESULTS: SIGNIFICANT CHANGE UP
SPECIMEN SOURCE: SIGNIFICANT CHANGE UP

## 2024-12-16 ENCOUNTER — APPOINTMENT (OUTPATIENT)
Dept: UROLOGY | Facility: CLINIC | Age: 64
End: 2024-12-16

## 2024-12-17 ENCOUNTER — RESULT REVIEW (OUTPATIENT)
Age: 64
End: 2024-12-17

## 2024-12-17 ENCOUNTER — INPATIENT (INPATIENT)
Facility: HOSPITAL | Age: 64
LOS: 1 days | Discharge: HOME HEALTH | DRG: 659 | End: 2024-12-19
Attending: UROLOGY | Admitting: UROLOGY
Payer: COMMERCIAL

## 2024-12-17 ENCOUNTER — APPOINTMENT (OUTPATIENT)
Dept: UROLOGY | Facility: HOSPITAL | Age: 64
End: 2024-12-17

## 2024-12-17 VITALS
TEMPERATURE: 98 F | DIASTOLIC BLOOD PRESSURE: 77 MMHG | OXYGEN SATURATION: 99 % | HEART RATE: 79 BPM | SYSTOLIC BLOOD PRESSURE: 154 MMHG | WEIGHT: 214.95 LBS | HEIGHT: 69 IN | RESPIRATION RATE: 18 BRPM

## 2024-12-17 DIAGNOSIS — N20.0 CALCULUS OF KIDNEY: ICD-10-CM

## 2024-12-17 DIAGNOSIS — Z98.890 OTHER SPECIFIED POSTPROCEDURAL STATES: Chronic | ICD-10-CM

## 2024-12-17 DIAGNOSIS — N20.1 CALCULUS OF URETER: ICD-10-CM

## 2024-12-17 LAB
ANION GAP SERPL CALC-SCNC: 13 MMOL/L — SIGNIFICANT CHANGE UP (ref 5–17)
ANION GAP SERPL CALC-SCNC: 16 MMOL/L — SIGNIFICANT CHANGE UP (ref 5–17)
BASOPHILS # BLD AUTO: 0.03 K/UL — SIGNIFICANT CHANGE UP (ref 0–0.2)
BASOPHILS NFR BLD AUTO: 0.3 % — SIGNIFICANT CHANGE UP (ref 0–2)
BUN SERPL-MCNC: 22 MG/DL — SIGNIFICANT CHANGE UP (ref 7–23)
BUN SERPL-MCNC: 23 MG/DL — SIGNIFICANT CHANGE UP (ref 7–23)
CALCIUM SERPL-MCNC: 9.3 MG/DL — SIGNIFICANT CHANGE UP (ref 8.4–10.5)
CALCIUM SERPL-MCNC: 9.3 MG/DL — SIGNIFICANT CHANGE UP (ref 8.4–10.5)
CHLORIDE SERPL-SCNC: 97 MMOL/L — SIGNIFICANT CHANGE UP (ref 96–108)
CHLORIDE SERPL-SCNC: 99 MMOL/L — SIGNIFICANT CHANGE UP (ref 96–108)
CO2 SERPL-SCNC: 19 MMOL/L — LOW (ref 22–31)
CO2 SERPL-SCNC: 22 MMOL/L — SIGNIFICANT CHANGE UP (ref 22–31)
CREAT SERPL-MCNC: 2.32 MG/DL — HIGH (ref 0.5–1.3)
CREAT SERPL-MCNC: 2.32 MG/DL — HIGH (ref 0.5–1.3)
EGFR: 30 ML/MIN/1.73M2 — LOW
EGFR: 30 ML/MIN/1.73M2 — LOW
EOSINOPHIL # BLD AUTO: 0.14 K/UL — SIGNIFICANT CHANGE UP (ref 0–0.5)
EOSINOPHIL NFR BLD AUTO: 1.2 % — SIGNIFICANT CHANGE UP (ref 0–6)
GLUCOSE BLDC GLUCOMTR-MCNC: 145 MG/DL — HIGH (ref 70–99)
GLUCOSE BLDC GLUCOMTR-MCNC: 166 MG/DL — HIGH (ref 70–99)
GLUCOSE BLDC GLUCOMTR-MCNC: 270 MG/DL — HIGH (ref 70–99)
GLUCOSE SERPL-MCNC: 172 MG/DL — HIGH (ref 70–99)
GLUCOSE SERPL-MCNC: 206 MG/DL — HIGH (ref 70–99)
HCT VFR BLD CALC: 34.8 % — LOW (ref 39–50)
HGB BLD-MCNC: 10.2 G/DL — LOW (ref 13–17)
IMM GRANULOCYTES NFR BLD AUTO: 0.4 % — SIGNIFICANT CHANGE UP (ref 0–0.9)
LYMPHOCYTES # BLD AUTO: 1.03 K/UL — SIGNIFICANT CHANGE UP (ref 1–3.3)
LYMPHOCYTES # BLD AUTO: 8.9 % — LOW (ref 13–44)
MCHC RBC-ENTMCNC: 24.2 PG — LOW (ref 27–34)
MCHC RBC-ENTMCNC: 29.3 G/DL — LOW (ref 32–36)
MCV RBC AUTO: 82.7 FL — SIGNIFICANT CHANGE UP (ref 80–100)
MONOCYTES # BLD AUTO: 0.4 K/UL — SIGNIFICANT CHANGE UP (ref 0–0.9)
MONOCYTES NFR BLD AUTO: 3.5 % — SIGNIFICANT CHANGE UP (ref 2–14)
NEUTROPHILS # BLD AUTO: 9.87 K/UL — HIGH (ref 1.8–7.4)
NEUTROPHILS NFR BLD AUTO: 85.7 % — HIGH (ref 43–77)
NRBC # BLD: 0 /100 WBCS — SIGNIFICANT CHANGE UP (ref 0–0)
PLATELET # BLD AUTO: 475 K/UL — HIGH (ref 150–400)
POTASSIUM SERPL-MCNC: 4.8 MMOL/L — SIGNIFICANT CHANGE UP (ref 3.5–5.3)
POTASSIUM SERPL-MCNC: 6.3 MMOL/L — CRITICAL HIGH (ref 3.5–5.3)
POTASSIUM SERPL-SCNC: 4.8 MMOL/L — SIGNIFICANT CHANGE UP (ref 3.5–5.3)
POTASSIUM SERPL-SCNC: 6.3 MMOL/L — CRITICAL HIGH (ref 3.5–5.3)
RBC # BLD: 4.21 M/UL — SIGNIFICANT CHANGE UP (ref 4.2–5.8)
RBC # FLD: 14.1 % — SIGNIFICANT CHANGE UP (ref 10.3–14.5)
SODIUM SERPL-SCNC: 129 MMOL/L — LOW (ref 135–145)
SODIUM SERPL-SCNC: 137 MMOL/L — SIGNIFICANT CHANGE UP (ref 135–145)
WBC # BLD: 11.52 K/UL — HIGH (ref 3.8–10.5)
WBC # FLD AUTO: 11.52 K/UL — HIGH (ref 3.8–10.5)

## 2024-12-17 PROCEDURE — 50437 DILAT XST TRC NEW ACCESS RCS: CPT | Mod: LT,59

## 2024-12-17 PROCEDURE — 88300 SURGICAL PATH GROSS: CPT | Mod: 26

## 2024-12-17 PROCEDURE — 50081 PERQ NL/PL LITHOTRP CPLX>2CM: CPT | Mod: LT

## 2024-12-17 PROCEDURE — 74176 CT ABD & PELVIS W/O CONTRAST: CPT | Mod: 26

## 2024-12-17 PROCEDURE — 50020 DRG PERIRNL/RENAL ABSC OPEN: CPT | Mod: LT,59

## 2024-12-17 PROCEDURE — 50389 REMOVE RENAL TUBE W/FLUORO: CPT | Mod: LT,59

## 2024-12-17 DEVICE — GUIDEWIRE SENSOR DUAL-FLEX NITINOL STRAIGHT .035" X 150CM: Type: IMPLANTABLE DEVICE | Site: LEFT | Status: FUNCTIONAL

## 2024-12-17 DEVICE — TRILOGY PROBE KIT 3.9MM X 440MM (BLUE): Type: IMPLANTABLE DEVICE | Site: LEFT | Status: FUNCTIONAL

## 2024-12-17 DEVICE — URETERAL CATH SOF-FLEX OPEN END 6FR .040" X 70CM: Type: IMPLANTABLE DEVICE | Site: LEFT | Status: FUNCTIONAL

## 2024-12-17 DEVICE — IMPLANTABLE DEVICE: Type: IMPLANTABLE DEVICE | Site: LEFT | Status: FUNCTIONAL

## 2024-12-17 DEVICE — STENT URET PERCFLX 8X24 DBL J: Type: IMPLANTABLE DEVICE | Site: LEFT | Status: FUNCTIONAL

## 2024-12-17 RX ORDER — 0.9 % SODIUM CHLORIDE 0.9 %
1000 INTRAVENOUS SOLUTION INTRAVENOUS
Refills: 0 | Status: DISCONTINUED | OUTPATIENT
Start: 2024-12-17 | End: 2024-12-18

## 2024-12-17 RX ORDER — FENTANYL 12 UG/H
50 PATCH, EXTENDED RELEASE TRANSDERMAL
Refills: 0 | Status: DISCONTINUED | OUTPATIENT
Start: 2024-12-17 | End: 2024-12-17

## 2024-12-17 RX ORDER — 0.9 % SODIUM CHLORIDE 0.9 %
1000 INTRAVENOUS SOLUTION INTRAVENOUS
Refills: 0 | Status: DISCONTINUED | OUTPATIENT
Start: 2024-12-17 | End: 2024-12-19

## 2024-12-17 RX ORDER — HYDROMORPHONE HYDROCHLORIDE 2 MG/1
0.5 TABLET ORAL
Refills: 0 | Status: DISCONTINUED | OUTPATIENT
Start: 2024-12-17 | End: 2024-12-17

## 2024-12-17 RX ORDER — AMLODIPINE BESYLATE 10 MG/1
5 TABLET ORAL DAILY
Refills: 0 | Status: DISCONTINUED | OUTPATIENT
Start: 2024-12-17 | End: 2024-12-19

## 2024-12-17 RX ORDER — HEPARIN SODIUM,PORCINE 1000/ML
5000 VIAL (ML) INJECTION EVERY 8 HOURS
Refills: 0 | Status: DISCONTINUED | OUTPATIENT
Start: 2024-12-17 | End: 2024-12-19

## 2024-12-17 RX ORDER — LOSARTAN POTASSIUM 100 MG/1
25 TABLET, FILM COATED ORAL DAILY
Refills: 0 | Status: DISCONTINUED | OUTPATIENT
Start: 2024-12-17 | End: 2024-12-19

## 2024-12-17 RX ORDER — TAMSULOSIN HYDROCHLORIDE 0.4 MG/1
0.4 CAPSULE ORAL
Refills: 0 | Status: DISCONTINUED | OUTPATIENT
Start: 2024-12-17 | End: 2024-12-19

## 2024-12-17 RX ORDER — SENNOSIDES 8.6 MG
2 TABLET ORAL AT BEDTIME
Refills: 0 | Status: DISCONTINUED | OUTPATIENT
Start: 2024-12-17 | End: 2024-12-19

## 2024-12-17 RX ORDER — CIPROFLOXACIN HCL 750 MG
400 TABLET ORAL ONCE
Refills: 0 | Status: DISCONTINUED | OUTPATIENT
Start: 2024-12-17 | End: 2024-12-17

## 2024-12-17 RX ORDER — ONDANSETRON HYDROCHLORIDE 4 MG/1
4 TABLET, FILM COATED ORAL EVERY 6 HOURS
Refills: 0 | Status: DISCONTINUED | OUTPATIENT
Start: 2024-12-17 | End: 2024-12-19

## 2024-12-17 RX ORDER — LIDOCAINE 40 MG/G
1 CREAM TOPICAL EVERY 24 HOURS
Refills: 0 | Status: DISCONTINUED | OUTPATIENT
Start: 2024-12-17 | End: 2024-12-19

## 2024-12-17 RX ORDER — PIPERACILLIN SODIUM AND TAZOBACTAM SODIUM 4; .5 G/20ML; G/20ML
3.38 INJECTION, POWDER, LYOPHILIZED, FOR SOLUTION INTRAVENOUS EVERY 8 HOURS
Refills: 0 | Status: DISCONTINUED | OUTPATIENT
Start: 2024-12-17 | End: 2024-12-19

## 2024-12-17 RX ORDER — GLUCAGON INJECTION, SOLUTION 0.5 MG/.1ML
1 INJECTION, SOLUTION SUBCUTANEOUS ONCE
Refills: 0 | Status: DISCONTINUED | OUTPATIENT
Start: 2024-12-17 | End: 2024-12-19

## 2024-12-17 RX ORDER — GLIPIZIDE 5 MG/1
1 TABLET, FILM COATED, EXTENDED RELEASE ORAL
Refills: 0 | DISCHARGE

## 2024-12-17 RX ORDER — ONDANSETRON HYDROCHLORIDE 4 MG/1
4 TABLET, FILM COATED ORAL ONCE
Refills: 0 | Status: DISCONTINUED | OUTPATIENT
Start: 2024-12-17 | End: 2024-12-17

## 2024-12-17 RX ORDER — ATENOLOL 100 MG/1
50 TABLET ORAL DAILY
Refills: 0 | Status: DISCONTINUED | OUTPATIENT
Start: 2024-12-17 | End: 2024-12-19

## 2024-12-17 RX ORDER — SODIUM CHLORIDE 9 MG/ML
3 INJECTION, SOLUTION INTRAMUSCULAR; INTRAVENOUS; SUBCUTANEOUS EVERY 8 HOURS
Refills: 0 | Status: DISCONTINUED | OUTPATIENT
Start: 2024-12-17 | End: 2024-12-17

## 2024-12-17 RX ORDER — FLUCONAZOLE 200 MG/1
200 TABLET ORAL EVERY 24 HOURS
Refills: 0 | Status: DISCONTINUED | OUTPATIENT
Start: 2024-12-17 | End: 2024-12-19

## 2024-12-17 RX ORDER — LIDOCAINE HCL 20 MG/ML
0.2 VIAL (ML) INJECTION ONCE
Refills: 0 | Status: DISCONTINUED | OUTPATIENT
Start: 2024-12-17 | End: 2024-12-17

## 2024-12-17 RX ORDER — INFLUENZA VIRUS VACCINE 15; 15; 15; 15 UG/.5ML; UG/.5ML; UG/.5ML; UG/.5ML
0.5 SUSPENSION INTRAMUSCULAR ONCE
Refills: 0 | Status: DISCONTINUED | OUTPATIENT
Start: 2024-12-17 | End: 2024-12-19

## 2024-12-17 RX ORDER — LOSARTAN POTASSIUM 100 MG/1
1 TABLET, FILM COATED ORAL
Refills: 0 | DISCHARGE

## 2024-12-17 RX ORDER — OXYCODONE HYDROCHLORIDE 30 MG/1
5 TABLET ORAL EVERY 6 HOURS
Refills: 0 | Status: DISCONTINUED | OUTPATIENT
Start: 2024-12-17 | End: 2024-12-19

## 2024-12-17 RX ORDER — ACETAMINOPHEN 500MG 500 MG/1
975 TABLET, COATED ORAL EVERY 6 HOURS
Refills: 0 | Status: DISCONTINUED | OUTPATIENT
Start: 2024-12-17 | End: 2024-12-19

## 2024-12-17 RX ADMIN — Medication 125 MILLILITER(S): at 21:34

## 2024-12-17 RX ADMIN — HYDROMORPHONE HYDROCHLORIDE 0.5 MILLIGRAM(S): 2 TABLET ORAL at 18:45

## 2024-12-17 RX ADMIN — TAMSULOSIN HYDROCHLORIDE 0.4 MILLIGRAM(S): 0.4 CAPSULE ORAL at 21:34

## 2024-12-17 RX ADMIN — HYDROMORPHONE HYDROCHLORIDE 0.5 MILLIGRAM(S): 2 TABLET ORAL at 19:00

## 2024-12-17 RX ADMIN — Medication 1: at 21:33

## 2024-12-17 NOTE — PROGRESS NOTE ADULT - ASSESSMENT
A/P: 64y Male s/p L PCNL     DVT prophylaxis/OOB  Incentive spirometry  Strict I&O's  Analgesia and antiemetics as needed  Diet - consistent carb   PACU Labs  CT at 10pm   AM labs  AM TOV   Tube plan pending CT

## 2024-12-17 NOTE — PRE-ANESTHESIA EVALUATION ADULT - NSANTHRISKNONERD_GEN_ALL_CORE
Start Keflex for folliculitis.     Warm compresses for your eye. Can you Ibuprofen as needed.    No risk alerts present

## 2024-12-17 NOTE — PATIENT PROFILE ADULT - FALL HARM RISK - UNIVERSAL INTERVENTIONS
Bed in lowest position, wheels locked, appropriate side rails in place/Call bell, personal items and telephone in reach/Instruct patient to call for assistance before getting out of bed or chair/Non-slip footwear when patient is out of bed/[place] to call system/Physically safe environment - no spills, clutter or unnecessary equipment/Purposeful Proactive Rounding/Room/bathroom lighting operational, light cord in reach

## 2024-12-17 NOTE — BRIEF OPERATIVE NOTE - NSICDXBRIEFPROCEDURE_GEN_ALL_CORE_FT
PROCEDURES:  Cystoscopy with nephrolithotomy, percutaneous 17-Dec-2024 18:22:15  Kiesha Montes De Oca

## 2024-12-17 NOTE — BRIEF OPERATIVE NOTE - OPERATION/FINDINGS
L PCNL 0:1:5  Nephroureteral tube exchange  Placement of pigtail cathter in retroperitoneum, debridement of RP collection

## 2024-12-18 LAB
A1C WITH ESTIMATED AVERAGE GLUCOSE RESULT: 10.7 % — HIGH (ref 4–5.6)
ANION GAP SERPL CALC-SCNC: 13 MMOL/L — SIGNIFICANT CHANGE UP (ref 5–17)
BASOPHILS # BLD AUTO: 0.01 K/UL — SIGNIFICANT CHANGE UP (ref 0–0.2)
BASOPHILS NFR BLD AUTO: 0.1 % — SIGNIFICANT CHANGE UP (ref 0–2)
BUN SERPL-MCNC: 25 MG/DL — HIGH (ref 7–23)
CALCIUM SERPL-MCNC: 9.1 MG/DL — SIGNIFICANT CHANGE UP (ref 8.4–10.5)
CHLORIDE SERPL-SCNC: 98 MMOL/L — SIGNIFICANT CHANGE UP (ref 96–108)
CO2 SERPL-SCNC: 22 MMOL/L — SIGNIFICANT CHANGE UP (ref 22–31)
CREAT SERPL-MCNC: 2.26 MG/DL — HIGH (ref 0.5–1.3)
EGFR: 31 ML/MIN/1.73M2 — LOW
EOSINOPHIL # BLD AUTO: 0 K/UL — SIGNIFICANT CHANGE UP (ref 0–0.5)
EOSINOPHIL NFR BLD AUTO: 0 % — SIGNIFICANT CHANGE UP (ref 0–6)
ESTIMATED AVERAGE GLUCOSE: 260 MG/DL — HIGH (ref 68–114)
GLUCOSE BLDC GLUCOMTR-MCNC: 178 MG/DL — HIGH (ref 70–99)
GLUCOSE BLDC GLUCOMTR-MCNC: 245 MG/DL — HIGH (ref 70–99)
GLUCOSE BLDC GLUCOMTR-MCNC: 258 MG/DL — HIGH (ref 70–99)
GLUCOSE BLDC GLUCOMTR-MCNC: 385 MG/DL — HIGH (ref 70–99)
GLUCOSE SERPL-MCNC: 423 MG/DL — HIGH (ref 70–99)
HCT VFR BLD CALC: 30.6 % — LOW (ref 39–50)
HGB BLD-MCNC: 9.4 G/DL — LOW (ref 13–17)
IMM GRANULOCYTES NFR BLD AUTO: 0.9 % — SIGNIFICANT CHANGE UP (ref 0–0.9)
LYMPHOCYTES # BLD AUTO: 0.63 K/UL — LOW (ref 1–3.3)
LYMPHOCYTES # BLD AUTO: 6.1 % — LOW (ref 13–44)
MCHC RBC-ENTMCNC: 25.3 PG — LOW (ref 27–34)
MCHC RBC-ENTMCNC: 30.7 G/DL — LOW (ref 32–36)
MCV RBC AUTO: 82.3 FL — SIGNIFICANT CHANGE UP (ref 80–100)
MONOCYTES # BLD AUTO: 0.24 K/UL — SIGNIFICANT CHANGE UP (ref 0–0.9)
MONOCYTES NFR BLD AUTO: 2.3 % — SIGNIFICANT CHANGE UP (ref 2–14)
NEUTROPHILS # BLD AUTO: 9.28 K/UL — HIGH (ref 1.8–7.4)
NEUTROPHILS NFR BLD AUTO: 90.6 % — HIGH (ref 43–77)
NRBC # BLD: 0 /100 WBCS — SIGNIFICANT CHANGE UP (ref 0–0)
PLATELET # BLD AUTO: 501 K/UL — HIGH (ref 150–400)
POTASSIUM SERPL-MCNC: 4.9 MMOL/L — SIGNIFICANT CHANGE UP (ref 3.5–5.3)
POTASSIUM SERPL-SCNC: 4.9 MMOL/L — SIGNIFICANT CHANGE UP (ref 3.5–5.3)
RBC # BLD: 3.72 M/UL — LOW (ref 4.2–5.8)
RBC # FLD: 13.9 % — SIGNIFICANT CHANGE UP (ref 10.3–14.5)
SODIUM SERPL-SCNC: 133 MMOL/L — LOW (ref 135–145)
WBC # BLD: 10.25 K/UL — SIGNIFICANT CHANGE UP (ref 3.8–10.5)
WBC # FLD AUTO: 10.25 K/UL — SIGNIFICANT CHANGE UP (ref 3.8–10.5)

## 2024-12-18 RX ORDER — POLYETHYLENE GLYCOL 3350 17 G/17G
17 POWDER, FOR SOLUTION ORAL DAILY
Refills: 0 | Status: DISCONTINUED | OUTPATIENT
Start: 2024-12-18 | End: 2024-12-19

## 2024-12-18 RX ORDER — SODIUM CHLORIDE 9 MG/ML
1000 INJECTION, SOLUTION INTRAMUSCULAR; INTRAVENOUS; SUBCUTANEOUS
Refills: 0 | Status: DISCONTINUED | OUTPATIENT
Start: 2024-12-18 | End: 2024-12-19

## 2024-12-18 RX ADMIN — ACETAMINOPHEN 500MG 975 MILLIGRAM(S): 500 TABLET, COATED ORAL at 23:34

## 2024-12-18 RX ADMIN — ACETAMINOPHEN 500MG 975 MILLIGRAM(S): 500 TABLET, COATED ORAL at 12:00

## 2024-12-18 RX ADMIN — TAMSULOSIN HYDROCHLORIDE 0.4 MILLIGRAM(S): 0.4 CAPSULE ORAL at 17:36

## 2024-12-18 RX ADMIN — PIPERACILLIN SODIUM AND TAZOBACTAM SODIUM 25 GRAM(S): 4; .5 INJECTION, POWDER, LYOPHILIZED, FOR SOLUTION INTRAVENOUS at 19:35

## 2024-12-18 RX ADMIN — ACETAMINOPHEN 500MG 975 MILLIGRAM(S): 500 TABLET, COATED ORAL at 06:21

## 2024-12-18 RX ADMIN — PIPERACILLIN SODIUM AND TAZOBACTAM SODIUM 25 GRAM(S): 4; .5 INJECTION, POWDER, LYOPHILIZED, FOR SOLUTION INTRAVENOUS at 00:16

## 2024-12-18 RX ADMIN — Medication 125 MILLILITER(S): at 08:46

## 2024-12-18 RX ADMIN — Medication 5000 UNIT(S): at 11:13

## 2024-12-18 RX ADMIN — ATENOLOL 50 MILLIGRAM(S): 100 TABLET ORAL at 05:21

## 2024-12-18 RX ADMIN — ACETAMINOPHEN 500MG 975 MILLIGRAM(S): 500 TABLET, COATED ORAL at 11:12

## 2024-12-18 RX ADMIN — ACETAMINOPHEN 500MG 975 MILLIGRAM(S): 500 TABLET, COATED ORAL at 01:16

## 2024-12-18 RX ADMIN — Medication 5: at 08:45

## 2024-12-18 RX ADMIN — Medication 5000 UNIT(S): at 17:36

## 2024-12-18 RX ADMIN — FLUCONAZOLE 100 MILLIGRAM(S): 200 TABLET ORAL at 17:35

## 2024-12-18 RX ADMIN — ACETAMINOPHEN 500MG 975 MILLIGRAM(S): 500 TABLET, COATED ORAL at 17:36

## 2024-12-18 RX ADMIN — PIPERACILLIN SODIUM AND TAZOBACTAM SODIUM 25 GRAM(S): 4; .5 INJECTION, POWDER, LYOPHILIZED, FOR SOLUTION INTRAVENOUS at 11:13

## 2024-12-18 RX ADMIN — Medication 4: at 12:47

## 2024-12-18 RX ADMIN — Medication 125 MILLILITER(S): at 05:22

## 2024-12-18 RX ADMIN — TAMSULOSIN HYDROCHLORIDE 0.4 MILLIGRAM(S): 0.4 CAPSULE ORAL at 05:21

## 2024-12-18 RX ADMIN — AMLODIPINE BESYLATE 5 MILLIGRAM(S): 10 TABLET ORAL at 05:21

## 2024-12-18 RX ADMIN — Medication 2: at 21:15

## 2024-12-18 RX ADMIN — Medication 5000 UNIT(S): at 00:16

## 2024-12-18 RX ADMIN — ACETAMINOPHEN 500MG 975 MILLIGRAM(S): 500 TABLET, COATED ORAL at 05:21

## 2024-12-18 RX ADMIN — Medication 2: at 16:32

## 2024-12-18 RX ADMIN — ACETAMINOPHEN 500MG 975 MILLIGRAM(S): 500 TABLET, COATED ORAL at 18:30

## 2024-12-18 RX ADMIN — POLYETHYLENE GLYCOL 3350 17 GRAM(S): 17 POWDER, FOR SOLUTION ORAL at 17:35

## 2024-12-18 RX ADMIN — ACETAMINOPHEN 500MG 975 MILLIGRAM(S): 500 TABLET, COATED ORAL at 00:16

## 2024-12-18 RX ADMIN — LOSARTAN POTASSIUM 25 MILLIGRAM(S): 100 TABLET, FILM COATED ORAL at 05:21

## 2024-12-18 NOTE — PROGRESS NOTE ADULT - ASSESSMENT
A/P: 64y Male s/p L PCNL and debridement of RP collection, POD#1    -AM labs  -TOV/PVR  -f/u CT scan final read  -Diet - consistent carb   -clarify L NT x 2 plans  -continue antibiotics - zosyn and diflucan  -Analgesia and antiemetics as needed  -OOB/DVT ppx/IS

## 2024-12-18 NOTE — PHYSICAL THERAPY INITIAL EVALUATION ADULT - ADDITIONAL COMMENTS
Pt resides with his sister in a private house with +8 steps to enter, 1 flight of stairs to bedroom. PTA, pt was independent with mobility/ADLs, used straight cane as needed. Pt was receiving home PT services. +

## 2024-12-18 NOTE — PHYSICAL THERAPY INITIAL EVALUATION ADULT - PERTINENT HX OF CURRENT PROBLEM, REHAB EVAL
64y Male s/p L PCNL and debridement of RP collection on 12/17. Hosp course: CT abdomen/pelvis (12/17) Trace residual left renal lower pole calculus. No left ureteral calculi. No hydronephrosis. Nonspecific colonic wall thickening, correlate for colitis.

## 2024-12-19 ENCOUNTER — TRANSCRIPTION ENCOUNTER (OUTPATIENT)
Age: 64
End: 2024-12-19

## 2024-12-19 VITALS
SYSTOLIC BLOOD PRESSURE: 152 MMHG | TEMPERATURE: 98 F | HEART RATE: 59 BPM | OXYGEN SATURATION: 99 % | DIASTOLIC BLOOD PRESSURE: 79 MMHG | RESPIRATION RATE: 18 BRPM

## 2024-12-19 PROBLEM — N20.0 CALCULUS OF KIDNEY: Chronic | Status: ACTIVE | Noted: 2024-12-13

## 2024-12-19 LAB
ANION GAP SERPL CALC-SCNC: 14 MMOL/L — SIGNIFICANT CHANGE UP (ref 5–17)
BUN SERPL-MCNC: 27 MG/DL — HIGH (ref 7–23)
CALCIUM SERPL-MCNC: 9.3 MG/DL — SIGNIFICANT CHANGE UP (ref 8.4–10.5)
CHLORIDE SERPL-SCNC: 99 MMOL/L — SIGNIFICANT CHANGE UP (ref 96–108)
CO2 SERPL-SCNC: 22 MMOL/L — SIGNIFICANT CHANGE UP (ref 22–31)
CREAT SERPL-MCNC: 0.55 MG/DL — SIGNIFICANT CHANGE UP (ref 0.5–1.3)
CULTURE RESULTS: ABNORMAL
CULTURE RESULTS: NO GROWTH — SIGNIFICANT CHANGE UP
EGFR: 110 ML/MIN/1.73M2 — SIGNIFICANT CHANGE UP
GLUCOSE BLDC GLUCOMTR-MCNC: 169 MG/DL — HIGH (ref 70–99)
GLUCOSE BLDC GLUCOMTR-MCNC: 219 MG/DL — HIGH (ref 70–99)
GLUCOSE SERPL-MCNC: 270 MG/DL — HIGH (ref 70–99)
HCT VFR BLD CALC: 31.8 % — LOW (ref 39–50)
HGB BLD-MCNC: 9.7 G/DL — LOW (ref 13–17)
MCHC RBC-ENTMCNC: 25.1 PG — LOW (ref 27–34)
MCHC RBC-ENTMCNC: 30.5 G/DL — LOW (ref 32–36)
MCV RBC AUTO: 82.2 FL — SIGNIFICANT CHANGE UP (ref 80–100)
NRBC # BLD: 0 /100 WBCS — SIGNIFICANT CHANGE UP (ref 0–0)
PLATELET # BLD AUTO: 533 K/UL — HIGH (ref 150–400)
POTASSIUM SERPL-MCNC: 4.2 MMOL/L — SIGNIFICANT CHANGE UP (ref 3.5–5.3)
POTASSIUM SERPL-SCNC: 4.2 MMOL/L — SIGNIFICANT CHANGE UP (ref 3.5–5.3)
RBC # BLD: 3.87 M/UL — LOW (ref 4.2–5.8)
RBC # FLD: 14.1 % — SIGNIFICANT CHANGE UP (ref 10.3–14.5)
SODIUM SERPL-SCNC: 135 MMOL/L — SIGNIFICANT CHANGE UP (ref 135–145)
SPECIMEN SOURCE: SIGNIFICANT CHANGE UP
SPECIMEN SOURCE: SIGNIFICANT CHANGE UP
WBC # BLD: 12.03 K/UL — HIGH (ref 3.8–10.5)
WBC # FLD AUTO: 12.03 K/UL — HIGH (ref 3.8–10.5)

## 2024-12-19 PROCEDURE — 87086 URINE CULTURE/COLONY COUNT: CPT

## 2024-12-19 PROCEDURE — 87070 CULTURE OTHR SPECIMN AEROBIC: CPT

## 2024-12-19 PROCEDURE — C1889: CPT

## 2024-12-19 PROCEDURE — 87077 CULTURE AEROBIC IDENTIFY: CPT

## 2024-12-19 PROCEDURE — 97161 PT EVAL LOW COMPLEX 20 MIN: CPT

## 2024-12-19 PROCEDURE — C1758: CPT

## 2024-12-19 PROCEDURE — 83036 HEMOGLOBIN GLYCOSYLATED A1C: CPT

## 2024-12-19 PROCEDURE — C1729: CPT

## 2024-12-19 PROCEDURE — 76000 FLUOROSCOPY <1 HR PHYS/QHP: CPT

## 2024-12-19 PROCEDURE — 88300 SURGICAL PATH GROSS: CPT

## 2024-12-19 PROCEDURE — 74176 CT ABD & PELVIS W/O CONTRAST: CPT | Mod: MC

## 2024-12-19 PROCEDURE — C2617: CPT

## 2024-12-19 PROCEDURE — 82962 GLUCOSE BLOOD TEST: CPT

## 2024-12-19 PROCEDURE — 85025 COMPLETE CBC W/AUTO DIFF WBC: CPT

## 2024-12-19 PROCEDURE — 80048 BASIC METABOLIC PNL TOTAL CA: CPT

## 2024-12-19 PROCEDURE — 85027 COMPLETE CBC AUTOMATED: CPT

## 2024-12-19 PROCEDURE — C9399: CPT

## 2024-12-19 PROCEDURE — C1769: CPT

## 2024-12-19 PROCEDURE — 82365 CALCULUS SPECTROSCOPY: CPT

## 2024-12-19 RX ORDER — POLYETHYLENE GLYCOL 3350 17 G/17G
17 POWDER, FOR SOLUTION ORAL
Qty: 0 | Refills: 0 | DISCHARGE
Start: 2024-12-19

## 2024-12-19 RX ORDER — SENNOSIDES 8.6 MG
2 TABLET ORAL
Qty: 0 | Refills: 0 | DISCHARGE
Start: 2024-12-19

## 2024-12-19 RX ORDER — FLUCONAZOLE 200 MG/1
1 TABLET ORAL
Qty: 6 | Refills: 0
Start: 2024-12-19 | End: 2024-12-21

## 2024-12-19 RX ORDER — AMOXICILLIN/POTASSIUM CLAV 250-125 MG
875 TABLET ORAL
Qty: 6 | Refills: 0
Start: 2024-12-19 | End: 2024-12-21

## 2024-12-19 RX ADMIN — Medication 5000 UNIT(S): at 11:13

## 2024-12-19 RX ADMIN — PIPERACILLIN SODIUM AND TAZOBACTAM SODIUM 25 GRAM(S): 4; .5 INJECTION, POWDER, LYOPHILIZED, FOR SOLUTION INTRAVENOUS at 02:02

## 2024-12-19 RX ADMIN — ACETAMINOPHEN 500MG 975 MILLIGRAM(S): 500 TABLET, COATED ORAL at 00:34

## 2024-12-19 RX ADMIN — Medication 2: at 13:46

## 2024-12-19 RX ADMIN — ACETAMINOPHEN 500MG 975 MILLIGRAM(S): 500 TABLET, COATED ORAL at 11:14

## 2024-12-19 RX ADMIN — ACETAMINOPHEN 500MG 975 MILLIGRAM(S): 500 TABLET, COATED ORAL at 05:06

## 2024-12-19 RX ADMIN — ACETAMINOPHEN 500MG 975 MILLIGRAM(S): 500 TABLET, COATED ORAL at 06:06

## 2024-12-19 RX ADMIN — LOSARTAN POTASSIUM 25 MILLIGRAM(S): 100 TABLET, FILM COATED ORAL at 05:04

## 2024-12-19 RX ADMIN — ATENOLOL 50 MILLIGRAM(S): 100 TABLET ORAL at 05:05

## 2024-12-19 RX ADMIN — Medication 4: at 08:20

## 2024-12-19 RX ADMIN — POLYETHYLENE GLYCOL 3350 17 GRAM(S): 17 POWDER, FOR SOLUTION ORAL at 11:15

## 2024-12-19 RX ADMIN — TAMSULOSIN HYDROCHLORIDE 0.4 MILLIGRAM(S): 0.4 CAPSULE ORAL at 05:04

## 2024-12-19 RX ADMIN — Medication 5000 UNIT(S): at 02:02

## 2024-12-19 RX ADMIN — AMLODIPINE BESYLATE 5 MILLIGRAM(S): 10 TABLET ORAL at 05:05

## 2024-12-19 RX ADMIN — SODIUM CHLORIDE 100 MILLILITER(S): 9 INJECTION, SOLUTION INTRAMUSCULAR; INTRAVENOUS; SUBCUTANEOUS at 00:02

## 2024-12-19 RX ADMIN — PIPERACILLIN SODIUM AND TAZOBACTAM SODIUM 25 GRAM(S): 4; .5 INJECTION, POWDER, LYOPHILIZED, FOR SOLUTION INTRAVENOUS at 11:13

## 2024-12-19 RX ADMIN — ACETAMINOPHEN 500MG 975 MILLIGRAM(S): 500 TABLET, COATED ORAL at 12:00

## 2024-12-19 NOTE — DISCHARGE NOTE PROVIDER - NSDCFUSCHEDAPPT_GEN_ALL_CORE_FT
Ashlee Reno  [facility] Physician [facility]  UROLOGY [address]  Scheduled Appointment: 12/23/2024

## 2024-12-19 NOTE — DISCHARGE NOTE PROVIDER - NSDCQMAMI_CARD_ALL_CORE
"Kady Valiente  1986 31 y.o.  [id number]      Patient Care Team:  No Known Provider as PCP - General    CC: Focal myocarditis    Interval History: Doing well no further chest pain or shortness of breath      Objective   Vital Signs  Temp:  [97.3 °F (36.3 °C)-97.9 °F (36.6 °C)] 97.7 °F (36.5 °C)  Heart Rate:  [63-75] 75  Resp:  [16-18] 18  BP: (124-161)/(72-96) 124/72    Intake/Output Summary (Last 24 hours) at 10/19/17 0828  Last data filed at 10/18/17 0919   Gross per 24 hour   Intake              400 ml   Output                0 ml   Net              400 ml     Flowsheet Rows         First Filed Value    Admission Height  69\" (175.3 cm) Documented at 10/18/2017 1153    Admission Weight  190 lb (86.2 kg) Documented at 10/18/2017 1153          Physical Exam:   General Appearance:    Alert,oriented, in no acute distress   Lungs:     Clear to auscultation,BS are equal    Heart:    Normal S1 and S2, RRR without murmur, gallop or rub   HEENT:    Sclera are clear, no JVD or adenopathy   Abdomen:     Normal bowel sounds, soft non-tender, non-distended, no HSM   Extremities:   Moves all extremities well, no edema, no cyanosis, no             Redness, no rash     Medication Review:        colchicine 0.6 mg Oral Daily   gadobenate dimeglumine 18 mL Intravenous Once in imaging   gadobenate dimeglumine 18 mL Intravenous Once in imaging   gadobenate dimeglumine 18 mL Intravenous Once in imaging   losartan 25 mg Oral Daily   metoprolol tartrate 25 mg Oral Q12H   metoprolol tartrate 25 mg Oral Q12H   potassium chloride 20 mEq Oral Daily       sodium chloride 100 mL/hr Last Rate: 100 mL/hr (10/18/17 1419)         I reviewed the patient's new clinical results.  I personally viewed and interpreted the patient's EKG/Telemetry data    Assessment/Plan  Active Hospital Problems (** Indicates Principal Problem)    Diagnosis Date Noted   • Myocarditis [I51.4] 10/18/2017      Resolved Hospital Problems    Diagnosis Date Noted Date " Resolved   No resolved problems to display.       She's doing well I have started her on beta-blockade and losartan as well as a little bit of cultures seen I have not seen any significant arrhythmia I would like to watch her for another 24 hours I anticipate she'll make a complete recovery.    Rishi Freeman MD  10/19/17  8:28 AM             No

## 2024-12-19 NOTE — DISCHARGE NOTE PROVIDER - NSDCHHNEEDSERVICEOTHER_GEN_ALL_CORE_FT
Patient has two drainage tubes in left flank, one is a nephroureteral catheter, the other is a perirenal drain with black dots. There is a 2-piece ostomy pouch around the tubes to collect leakage. The nephroureteral tube is attached to connecting tubing though the perales bag which can be accessed to flush by the patient with 5 cc saline 1-2 times per day or as needed for flank pressure. Patient was provided with extra ostomy supplies, 5cc saline flushes, tape and dry chucks.

## 2024-12-19 NOTE — PROGRESS NOTE ADULT - SUBJECTIVE AND OBJECTIVE BOX
Post op Check    Pt seen and examined without complaints. Pain is controlled, endorses mild back pain. Denies SOB/CP/N/V.     Vital Signs Last 24 Hrs  T(C): 36.7 (17 Dec 2024 18:15), Max: 36.9 (17 Dec 2024 15:00)  T(F): 98.1 (17 Dec 2024 18:15), Max: 98.4 (17 Dec 2024 15:00)  HR: 83 (17 Dec 2024 20:00) (76 - 87)  BP: 160/77 (17 Dec 2024 20:00) (154/77 - 173/82)  BP(mean): 111 (17 Dec 2024 20:00) (111 - 118)  RR: 16 (17 Dec 2024 20:00) (15 - 18)  SpO2: 95% (17 Dec 2024 20:00) (95% - 100%)    Parameters below as of 17 Dec 2024 20:00  Patient On (Oxygen Delivery Method): room air        I&O's Summary    17 Dec 2024 07:01  -  17 Dec 2024 20:35  --------------------------------------------------------  IN: 375 mL / OUT: 350 mL / NET: 25 mL        Physical Exam  Gen: NAD, A&Ox3  Pulm: No respiratory distress, no subcostal retractions  Abd: Soft, NT, ND  Back: L dressing clean/dry/intact, NT in place draining well clear yellow, L RP pigtail drain in place with minimal drainage   : perales in place draining clear yellow                          10.2   11.52 )-----------( 475      ( 17 Dec 2024 19:20 )             34.8       12-17    129[L]  |  97  |  23  ----------------------------<  172[H]  6.3[HH]   |  19[L]  |  2.32[H]    Ca    9.3      17 Dec 2024 19:20          
The patient was seen and examined at bedside.  No acute events overnight and the patient is without acute complaints this AM.    T(C): 36.9 (12-19-24 @ 05:15), Max: 36.9 (12-19-24 @ 01:42)  HR: 66 (12-19-24 @ 05:15) (60 - 71)  BP: 149/65 (12-19-24 @ 05:15) (136/72 - 163/75)  RR: 18 (12-19-24 @ 05:15) (18 - 18)  SpO2: 96% (12-19-24 @ 05:15) (96% - 99%)  Wt(kg): --    Physical Exam:    General: NAD, A+Ox3  Abdomen: soft, non-tender, non-distended  Back: dressing with serosanguinous drainage and was changed this AM; no ecchymosis or swelling        12-18 @ 07:01  -  12-19 @ 07:00  --------------------------------------------------------  IN: 2180 mL / OUT: 1900 mL / NET: 280 mL      LNT (upper back) - 50cc clear pink    LNT (lower back) - 1350cc clear        CT 12/17:   IMPRESSION:  1.  Trace residual left renal lower pole calculus. No left ureteral   calculi. No hydronephrosis.  2.  Nonspecific colonic wall thickening, correlate for colitis.  
The patient was seen and examined at bedside.  No acute events overnight and the patient is without acute complaints this AM.  Pt's perales catheter was removed this AM.    T(C): 36.8 (12-18-24 @ 05:16), Max: 36.9 (12-17-24 @ 15:00)  HR: 73 (12-18-24 @ 05:16) (73 - 93)  BP: 161/77 (12-18-24 @ 05:16) (141/67 - 173/82)  RR: 18 (12-18-24 @ 05:16) (15 - 18)  SpO2: 97% (12-18-24 @ 05:16) (95% - 100%)  Wt(kg): --    Physical Exam:    General: NAD, A+Ox3  Abdomen: soft, non-tender, non-distended  Back: dressing with serosanguinous drainage and was changed this AM; no ecchymosis or swelling      12-17 @ 07:01  -  12-18 @ 07:00  --------------------------------------------------------  IN: 2410 mL / OUT: 2600 mL / NET: -190 mL      F - 2375cc clear    LNT - 0cc    LNT - 0cc      CT A/P (12/17) - performed penind final read

## 2024-12-19 NOTE — DISCHARGE NOTE PROVIDER - HOSPITAL COURSE
This is a 64 year old male who underwent a scheduled left PCNL and debridement of retroperitoneal collection 12/17.  Post operatively, he remained hemodynamically stable and did not require any blood products.  POD#1 his perales catheter was removed and he was able to void without retention.  He remained pain controlled and was discharged on POD#2.  Prior to discharge, the patient was tolerating a regular diet, pain controlled, and having GI function.  He was discharge with antibiotics and his 2x tubes in the back (1 is a R NT and the other is a drain into the RP space where the collection was).

## 2024-12-19 NOTE — DISCHARGE NOTE PROVIDER - NSDCHHNEEDSERVICE_GEN_ALL_CORE
Teaching and training/Wound care and assessment Teaching and training/Wound care and assessment/Other, specify...

## 2024-12-19 NOTE — DISCHARGE NOTE PROVIDER - NSDCFUADDINST_GEN_ALL_CORE_FT
No baths allowed until the tubes in the back are removed and the wounds in the back have healed up completely after that.

## 2024-12-19 NOTE — PROGRESS NOTE ADULT - ATTENDING COMMENTS
Postop day #1  CT reviewed stone free on the left side  Output from left nephroureteral tube and left retroperitoneal drain noted  Keep both drains open to drainage  Continue Zosyn and Diflucan  Shen removed, check postvoid residual
As above  50 cc drained from left retroperitoneal drain  He complains of leakage around left nephroureteral tube site left lower abdominal pressure with voiding  Reassured patient that this is due to reflux of urine along the ureteral portion of the left nephroureteral tube, and should subside with time  Leave both drains open to gravity  He can be discharged with both drains in place with home nursing visits and he will see me early next week as an outpatient for follow-up

## 2024-12-19 NOTE — DISCHARGE NOTE PROVIDER - NSDCMRMEDTOKEN_GEN_ALL_CORE_FT
acetaminophen 325 mg oral tablet: 3 tab(s) orally every 6 hours  amLODIPine 5 mg oral tablet: 1 tab(s) orally once a day  atenolol 50 mg oral tablet: 1 tab(s) orally once a day  Flomax 0.4 mg oral capsule: 1 cap(s) orally 2 times a day  glipiZIDE 10 mg oral tablet: 1 tab(s) orally once a day  losartan 25 mg oral tablet: 1 tab(s) orally once a day  metFORMIN 500 mg oral tablet: 1 tab(s) orally 2 times a day  polyethylene glycol 3350 oral powder for reconstitution: 17 gram(s) orally once a day  senna leaf extract oral tablet: 2 tab(s) orally once a day (at bedtime) As needed Constipation   acetaminophen 325 mg oral tablet: 3 tab(s) orally every 6 hours  amLODIPine 5 mg oral tablet: 1 tab(s) orally once a day  amoxicillin-clavulanate 875 mg-125 mg oral tablet: 875 milligram(s) orally 2 times a day  atenolol 50 mg oral tablet: 1 tab(s) orally once a day  Flomax 0.4 mg oral capsule: 1 cap(s) orally 2 times a day  fluconazole 50 mg oral tablet: 1 tab(s) orally 2 times a day  glipiZIDE 10 mg oral tablet: 1 tab(s) orally once a day  losartan 25 mg oral tablet: 1 tab(s) orally once a day  metFORMIN 500 mg oral tablet: 1 tab(s) orally 2 times a day  polyethylene glycol 3350 oral powder for reconstitution: 17 gram(s) orally once a day  senna leaf extract oral tablet: 2 tab(s) orally once a day (at bedtime) As needed Constipation

## 2024-12-19 NOTE — DISCHARGE NOTE PROVIDER - NSDCCPCAREPLAN_GEN_ALL_CORE_FT
PRINCIPAL DISCHARGE DIAGNOSIS  Diagnosis: Nephrolithiasis  Assessment and Plan of Treatment: You were discharged with 2 tubes in the [facility].  One is a nephrostomy tube and goes into the kidney and the other is a tube that drains a collection outside the kidney.  Follow up with Dr Reno On Monday to be evaluated.  Cover the back wounds with a clean gauze and tape.  Take tylenol for pain, senna to prevent constipation, and the antibiotics  Drink plenty of fluids  Call the office at [phone] if you have fevers of 100.8F or greater, heavy bleeding in the urine with clots, worsening difficulty urinating.        SECONDARY DISCHARGE DIAGNOSES  Diagnosis: HTN (hypertension)  Assessment and Plan of Treatment: Take your blood pressure medications daily and follow up routinely with your primary care doctor.      Diagnosis: DM (diabetes mellitus)  Assessment and Plan of Treatment: Continue taking  your diabetic medication regularly.  Be sure to check your fingersticks 3x/day and before bedtime.  Follow up routinely with your primary care doctor or your endocrinologist.      Diagnosis: BPH (benign prostatic hyperplasia)  Assessment and Plan of Treatment: Continue your medications and follow up with Dr Reno routinely.

## 2024-12-19 NOTE — DISCHARGE NOTE PROVIDER - NSDCCPTREATMENT_GEN_ALL_CORE_FT
PRINCIPAL PROCEDURE  Procedure: Cystoscopy with percutaneous nephrolithotomy (PCNL) and balloon occlusion of ureter  Findings and Treatment:

## 2024-12-19 NOTE — DISCHARGE NOTE NURSING/CASE MANAGEMENT/SOCIAL WORK - NSPROEXTENSIONSOFSELF_GEN_A_NUR
To: [facility]INICAL STAFF      From: Kalli Fairchild      Created: 5/31/2022 1:43 PM        *-*-*This message has not been handled. *-*-*    It's on my back and chest! I'm almost finished with the prescription and I see you June 21st! And it's only helped a little with the diarrhea!
none
06-Nov-2018 07:27

## 2024-12-19 NOTE — PROGRESS NOTE ADULT - ASSESSMENT
A/P: 64y Male s/p L PCNL and debridement of RP collection, POD#2    -AM labs  -Diet - consistent carb   -clarify L NT x 2 plans, possible remove drain today  -continue antibiotics - zosyn and diflucan  -Analgesia and antiemetics as needed  -OOB/DVT ppx/IS  -possible discharge today with remaining left NT to drainage   A/P: 64y Male s/p L PCNL and debridement of RP collection, POD#2    -AM labs  -Diet - consistent carb   -continue antibiotics - zosyn and diflucan  -Analgesia and antiemetics as needed  -OOB/DVT ppx/IS  -discharge today with both tubes to drainage

## 2024-12-19 NOTE — DISCHARGE NOTE NURSING/CASE MANAGEMENT/SOCIAL WORK - PATIENT PORTAL LINK FT
You can access the FollowMyHealth Patient Portal offered by [facility] by registering at the following website: [email or web address]. By joining [facility]’s FollowMyHealth portal, you will also be able to view your health information using other applications (apps) compatible with our system.

## 2024-12-19 NOTE — DISCHARGE NOTE PROVIDER - CARE PROVIDER_API CALL
Ashlee Reno  Urology  [address] [place] [address], [address] [address]  Phone: [phone]  Fax: [phone]  Scheduled Appointment: 12/23/2024

## 2024-12-19 NOTE — DISCHARGE NOTE NURSING/CASE MANAGEMENT/SOCIAL WORK - FINANCIAL ASSISTANCE
[facility] provides services at a reduced cost to those who are determined to be eligible through [facility]’s financial assistance program. Information regarding [facility]’s financial assistance program can be found by going to [email or web address] or by calling [phone].

## 2024-12-23 ENCOUNTER — APPOINTMENT (OUTPATIENT)
Dept: UROLOGY | Facility: CLINIC | Age: 64
End: 2024-12-23
Payer: MEDICARE

## 2024-12-23 DIAGNOSIS — N13.8 BENIGN PROSTATIC HYPERPLASIA WITH LOWER URINARY TRACT SYMPMS: ICD-10-CM

## 2024-12-23 DIAGNOSIS — N40.1 BENIGN PROSTATIC HYPERPLASIA WITH LOWER URINARY TRACT SYMPMS: ICD-10-CM

## 2024-12-23 DIAGNOSIS — N21.0 CALCULUS IN BLADDER: ICD-10-CM

## 2024-12-23 PROCEDURE — 99024 POSTOP FOLLOW-UP VISIT: CPT

## 2024-12-24 LAB — SURGICAL PATHOLOGY STUDY: SIGNIFICANT CHANGE UP

## 2024-12-26 LAB
CELL MATERIAL STONE EST-MCNT: SIGNIFICANT CHANGE UP
LABORATORY COMMENT REPORT: SIGNIFICANT CHANGE UP
NIDUS STONE QN: SIGNIFICANT CHANGE UP

## 2025-01-07 ENCOUNTER — APPOINTMENT (OUTPATIENT)
Dept: UROLOGY | Facility: CLINIC | Age: 65
End: 2025-01-07
Payer: MEDICARE

## 2025-01-07 VITALS
OXYGEN SATURATION: 98 % | DIASTOLIC BLOOD PRESSURE: 79 MMHG | RESPIRATION RATE: 16 BRPM | HEART RATE: 66 BPM | TEMPERATURE: 98.2 F | SYSTOLIC BLOOD PRESSURE: 160 MMHG

## 2025-01-07 DIAGNOSIS — Z12.5 ENCOUNTER FOR SCREENING FOR MALIGNANT NEOPLASM OF PROSTATE: ICD-10-CM

## 2025-01-07 PROCEDURE — 99214 OFFICE O/P EST MOD 30 MIN: CPT | Mod: 57

## 2025-01-09 PROBLEM — Z12.5 SCREENING PSA (PROSTATE SPECIFIC ANTIGEN): Status: ACTIVE | Noted: 2025-01-09

## 2025-01-15 ENCOUNTER — APPOINTMENT (OUTPATIENT)
Dept: UROLOGY | Facility: CLINIC | Age: 65
End: 2025-01-15
Payer: MEDICARE

## 2025-01-15 ENCOUNTER — NON-APPOINTMENT (OUTPATIENT)
Age: 65
End: 2025-01-15

## 2025-01-15 VITALS
RESPIRATION RATE: 17 BRPM | WEIGHT: 210 LBS | DIASTOLIC BLOOD PRESSURE: 85 MMHG | HEART RATE: 64 BPM | BODY MASS INDEX: 31.83 KG/M2 | TEMPERATURE: 98.6 F | SYSTOLIC BLOOD PRESSURE: 185 MMHG | HEIGHT: 68 IN

## 2025-01-15 DIAGNOSIS — N13.8 BENIGN PROSTATIC HYPERPLASIA WITH LOWER URINARY TRACT SYMPMS: ICD-10-CM

## 2025-01-15 DIAGNOSIS — N20.0 CALCULUS OF KIDNEY: ICD-10-CM

## 2025-01-15 DIAGNOSIS — N40.1 BENIGN PROSTATIC HYPERPLASIA WITH LOWER URINARY TRACT SYMPMS: ICD-10-CM

## 2025-01-15 PROCEDURE — 99024 POSTOP FOLLOW-UP VISIT: CPT

## 2025-01-17 ENCOUNTER — NON-APPOINTMENT (OUTPATIENT)
Age: 65
End: 2025-01-17

## 2025-01-17 LAB — BACTERIA UR CULT: ABNORMAL

## 2025-01-24 DIAGNOSIS — N39.0 URINARY TRACT INFECTION, SITE NOT SPECIFIED: ICD-10-CM

## 2025-01-24 DIAGNOSIS — A49.8 OTHER BACTERIAL INFECTIONS OF UNSPECIFIED SITE: ICD-10-CM

## 2025-01-24 RX ORDER — FOSFOMYCIN TROMETHAMINE 3 G/1
3 POWDER ORAL
Qty: 3 | Refills: 0 | Status: ACTIVE | COMMUNITY
Start: 2025-01-24 | End: 1900-01-01

## 2025-01-30 ENCOUNTER — NON-APPOINTMENT (OUTPATIENT)
Age: 65
End: 2025-01-30

## 2025-01-30 RX ORDER — AMOXICILLIN AND CLAVULANATE POTASSIUM 500; 125 MG/1; MG/1
500-125 TABLET, FILM COATED ORAL
Qty: 14 | Refills: 0 | Status: ACTIVE | COMMUNITY
Start: 2025-01-30 | End: 1900-01-01

## 2025-02-05 ENCOUNTER — APPOINTMENT (OUTPATIENT)
Dept: UROLOGY | Facility: CLINIC | Age: 65
End: 2025-02-05

## 2025-02-16 ENCOUNTER — OUTPATIENT (OUTPATIENT)
Dept: OUTPATIENT SERVICES | Facility: HOSPITAL | Age: 65
LOS: 1 days | End: 2025-02-16
Payer: COMMERCIAL

## 2025-02-16 DIAGNOSIS — Z98.890 OTHER SPECIFIED POSTPROCEDURAL STATES: Chronic | ICD-10-CM

## 2025-02-16 DIAGNOSIS — N20.0 CALCULUS OF KIDNEY: ICD-10-CM

## 2025-02-16 PROCEDURE — 76770 US EXAM ABDO BACK WALL COMP: CPT

## 2025-02-19 ENCOUNTER — APPOINTMENT (OUTPATIENT)
Dept: UROLOGY | Facility: HOSPITAL | Age: 65
End: 2025-02-19

## 2025-02-24 ENCOUNTER — APPOINTMENT (OUTPATIENT)
Dept: UROLOGY | Facility: CLINIC | Age: 65
End: 2025-02-24

## 2025-03-24 ENCOUNTER — APPOINTMENT (OUTPATIENT)
Dept: UROLOGY | Facility: CLINIC | Age: 65
End: 2025-03-24
Payer: MEDICARE

## 2025-03-24 VITALS
HEART RATE: 64 BPM | WEIGHT: 210 LBS | HEIGHT: 68 IN | DIASTOLIC BLOOD PRESSURE: 81 MMHG | SYSTOLIC BLOOD PRESSURE: 176 MMHG | BODY MASS INDEX: 31.83 KG/M2 | RESPIRATION RATE: 17 BRPM | TEMPERATURE: 97.5 F

## 2025-03-24 DIAGNOSIS — N20.1 CALCULUS OF URETER: ICD-10-CM

## 2025-03-24 DIAGNOSIS — Z12.5 ENCOUNTER FOR SCREENING FOR MALIGNANT NEOPLASM OF PROSTATE: ICD-10-CM

## 2025-03-24 DIAGNOSIS — N13.8 BENIGN PROSTATIC HYPERPLASIA WITH LOWER URINARY TRACT SYMPMS: ICD-10-CM

## 2025-03-24 DIAGNOSIS — N20.0 CALCULUS OF KIDNEY: ICD-10-CM

## 2025-03-24 DIAGNOSIS — N40.1 BENIGN PROSTATIC HYPERPLASIA WITH LOWER URINARY TRACT SYMPMS: ICD-10-CM

## 2025-03-24 DIAGNOSIS — N21.0 CALCULUS IN BLADDER: ICD-10-CM

## 2025-03-24 PROCEDURE — 99214 OFFICE O/P EST MOD 30 MIN: CPT

## 2025-03-24 RX ORDER — TAMSULOSIN HYDROCHLORIDE 0.4 MG/1
0.4 CAPSULE ORAL
Qty: 90 | Refills: 3 | Status: ACTIVE | COMMUNITY
Start: 2025-03-24 | End: 1900-01-01

## 2025-03-26 LAB — BACTERIA UR CULT: ABNORMAL

## 2025-06-28 NOTE — PATIENT PROFILE ADULT - NSPROGENBLOODRESTRICT_GEN_A_NUR
Laceration to RT eye on Thursday eye is swollen and vision is blurry.  Viridiana Puri MA           none

## 2025-07-08 ENCOUNTER — APPOINTMENT (OUTPATIENT)
Dept: UROLOGY | Facility: CLINIC | Age: 65
End: 2025-07-08

## 2025-08-31 ENCOUNTER — EMERGENCY (EMERGENCY)
Facility: HOSPITAL | Age: 65
LOS: 0 days | Discharge: HOME | End: 2025-08-31
Attending: STUDENT IN AN ORGANIZED HEALTH CARE EDUCATION/TRAINING PROGRAM
Payer: MEDICARE

## 2025-08-31 VITALS
SYSTOLIC BLOOD PRESSURE: 195 MMHG | DIASTOLIC BLOOD PRESSURE: 87 MMHG | HEART RATE: 70 BPM | RESPIRATION RATE: 18 BRPM | TEMPERATURE: 98 F | HEIGHT: 68 IN | WEIGHT: 225.09 LBS | OXYGEN SATURATION: 97 %

## 2025-08-31 VITALS
DIASTOLIC BLOOD PRESSURE: 84 MMHG | HEART RATE: 66 BPM | SYSTOLIC BLOOD PRESSURE: 161 MMHG | OXYGEN SATURATION: 98 % | RESPIRATION RATE: 18 BRPM

## 2025-08-31 DIAGNOSIS — M54.30 SCIATICA, UNSPECIFIED SIDE: ICD-10-CM

## 2025-08-31 DIAGNOSIS — I10 ESSENTIAL (PRIMARY) HYPERTENSION: ICD-10-CM

## 2025-08-31 DIAGNOSIS — Z98.890 OTHER SPECIFIED POSTPROCEDURAL STATES: Chronic | ICD-10-CM

## 2025-08-31 DIAGNOSIS — M25.552 PAIN IN LEFT HIP: ICD-10-CM

## 2025-08-31 DIAGNOSIS — Z88.0 ALLERGY STATUS TO PENICILLIN: ICD-10-CM

## 2025-08-31 LAB
ALBUMIN SERPL ELPH-MCNC: 3.2 G/DL — LOW (ref 3.3–5)
ALP SERPL-CCNC: 88 U/L — SIGNIFICANT CHANGE UP (ref 40–120)
ALT FLD-CCNC: 21 U/L — SIGNIFICANT CHANGE UP (ref 12–78)
ANION GAP SERPL CALC-SCNC: 4 MMOL/L — LOW (ref 5–17)
APPEARANCE UR: CLEAR — SIGNIFICANT CHANGE UP
AST SERPL-CCNC: 10 U/L — LOW (ref 15–37)
BASOPHILS # BLD AUTO: 0.04 K/UL — SIGNIFICANT CHANGE UP (ref 0–0.2)
BASOPHILS NFR BLD AUTO: 0.5 % — SIGNIFICANT CHANGE UP (ref 0–2)
BILIRUB SERPL-MCNC: 0.3 MG/DL — SIGNIFICANT CHANGE UP (ref 0.2–1.2)
BILIRUB UR-MCNC: NEGATIVE — SIGNIFICANT CHANGE UP
BUN SERPL-MCNC: 25 MG/DL — HIGH (ref 7–23)
CALCIUM SERPL-MCNC: 8.7 MG/DL — SIGNIFICANT CHANGE UP (ref 8.5–10.1)
CHLORIDE SERPL-SCNC: 105 MMOL/L — SIGNIFICANT CHANGE UP (ref 96–108)
CO2 SERPL-SCNC: 29 MMOL/L — SIGNIFICANT CHANGE UP (ref 22–31)
COLOR SPEC: YELLOW — SIGNIFICANT CHANGE UP
CREAT SERPL-MCNC: 2.4 MG/DL — HIGH (ref 0.5–1.3)
CRP SERPL-MCNC: 38 MG/L — HIGH
DIFF PNL FLD: ABNORMAL
EGFR: 29 ML/MIN/1.73M2 — LOW
EGFR: 29 ML/MIN/1.73M2 — LOW
EOSINOPHIL # BLD AUTO: 0.17 K/UL — SIGNIFICANT CHANGE UP (ref 0–0.5)
EOSINOPHIL NFR BLD AUTO: 2 % — SIGNIFICANT CHANGE UP (ref 0–6)
GLUCOSE SERPL-MCNC: 271 MG/DL — HIGH (ref 70–99)
GLUCOSE UR QL: 500 MG/DL
HCT VFR BLD CALC: 35.2 % — LOW (ref 39–50)
HGB BLD-MCNC: 10.9 G/DL — LOW (ref 13–17)
IMM GRANULOCYTES NFR BLD AUTO: 0.3 % — SIGNIFICANT CHANGE UP (ref 0–0.9)
KETONES UR QL: NEGATIVE MG/DL — SIGNIFICANT CHANGE UP
LEUKOCYTE ESTERASE UR-ACNC: ABNORMAL
LIDOCAIN IGE QN: 26 U/L — SIGNIFICANT CHANGE UP (ref 13–75)
LYMPHOCYTES # BLD AUTO: 1.63 K/UL — SIGNIFICANT CHANGE UP (ref 1–3.3)
LYMPHOCYTES # BLD AUTO: 18.8 % — SIGNIFICANT CHANGE UP (ref 13–44)
MAGNESIUM SERPL-MCNC: 2.2 MG/DL — SIGNIFICANT CHANGE UP (ref 1.6–2.6)
MCHC RBC-ENTMCNC: 27.2 PG — SIGNIFICANT CHANGE UP (ref 27–34)
MCHC RBC-ENTMCNC: 31 G/DL — LOW (ref 32–36)
MCV RBC AUTO: 87.8 FL — SIGNIFICANT CHANGE UP (ref 80–100)
MONOCYTES # BLD AUTO: 0.8 K/UL — SIGNIFICANT CHANGE UP (ref 0–0.9)
MONOCYTES NFR BLD AUTO: 9.2 % — SIGNIFICANT CHANGE UP (ref 2–14)
NEUTROPHILS # BLD AUTO: 5.98 K/UL — SIGNIFICANT CHANGE UP (ref 1.8–7.4)
NEUTROPHILS NFR BLD AUTO: 69.2 % — SIGNIFICANT CHANGE UP (ref 43–77)
NITRITE UR-MCNC: NEGATIVE — SIGNIFICANT CHANGE UP
NRBC BLD AUTO-RTO: 0 /100 WBCS — SIGNIFICANT CHANGE UP (ref 0–0)
PH UR: 7 — SIGNIFICANT CHANGE UP (ref 5–8)
PLATELET # BLD AUTO: 288 K/UL — SIGNIFICANT CHANGE UP (ref 150–400)
POTASSIUM SERPL-MCNC: 4.3 MMOL/L — SIGNIFICANT CHANGE UP (ref 3.5–5.3)
POTASSIUM SERPL-SCNC: 4.3 MMOL/L — SIGNIFICANT CHANGE UP (ref 3.5–5.3)
PROT SERPL-MCNC: 7.9 GM/DL — SIGNIFICANT CHANGE UP (ref 6–8.3)
PROT UR-MCNC: 100 MG/DL
RBC # BLD: 4.01 M/UL — LOW (ref 4.2–5.8)
RBC # FLD: 13.5 % — SIGNIFICANT CHANGE UP (ref 10.3–14.5)
SODIUM SERPL-SCNC: 138 MMOL/L — SIGNIFICANT CHANGE UP (ref 135–145)
SP GR SPEC: 1.02 — SIGNIFICANT CHANGE UP (ref 1–1.03)
UROBILINOGEN FLD QL: 0.2 MG/DL — SIGNIFICANT CHANGE UP (ref 0.2–1)
WBC # BLD: 8.65 K/UL — SIGNIFICANT CHANGE UP (ref 3.8–10.5)
WBC # FLD AUTO: 8.65 K/UL — SIGNIFICANT CHANGE UP (ref 3.8–10.5)

## 2025-08-31 PROCEDURE — 74177 CT ABD & PELVIS W/CONTRAST: CPT | Mod: 26

## 2025-08-31 PROCEDURE — 99285 EMERGENCY DEPT VISIT HI MDM: CPT

## 2025-08-31 PROCEDURE — 73502 X-RAY EXAM HIP UNI 2-3 VIEWS: CPT | Mod: 26,LT

## 2025-08-31 RX ORDER — TRAMADOL HYDROCHLORIDE 50 MG/1
1 TABLET, FILM COATED ORAL
Qty: 9 | Refills: 0
Start: 2025-08-31 | End: 2025-09-02

## 2025-08-31 RX ORDER — TRAMADOL HYDROCHLORIDE 50 MG/1
50 TABLET, FILM COATED ORAL ONCE
Refills: 0 | Status: DISCONTINUED | OUTPATIENT
Start: 2025-08-31 | End: 2025-08-31

## 2025-08-31 RX ORDER — METHOCARBAMOL 500 MG/1
750 TABLET, FILM COATED ORAL ONCE
Refills: 0 | Status: COMPLETED | OUTPATIENT
Start: 2025-08-31 | End: 2025-08-31

## 2025-08-31 RX ORDER — ACETAMINOPHEN 500 MG/5ML
975 LIQUID (ML) ORAL ONCE
Refills: 0 | Status: COMPLETED | OUTPATIENT
Start: 2025-08-31 | End: 2025-08-31

## 2025-08-31 RX ORDER — LIDOCAINE HYDROCHLORIDE 20 MG/ML
1 JELLY TOPICAL ONCE
Refills: 0 | Status: COMPLETED | OUTPATIENT
Start: 2025-08-31 | End: 2025-08-31

## 2025-08-31 RX ORDER — KETOROLAC TROMETHAMINE 30 MG/ML
15 INJECTION, SOLUTION INTRAMUSCULAR; INTRAVENOUS ONCE
Refills: 0 | Status: DISCONTINUED | OUTPATIENT
Start: 2025-08-31 | End: 2025-08-31

## 2025-08-31 RX ORDER — METHOCARBAMOL 500 MG/1
1 TABLET, FILM COATED ORAL
Qty: 15 | Refills: 0
Start: 2025-08-31 | End: 2025-09-04

## 2025-08-31 RX ORDER — SODIUM CHLORIDE 9 G/1000ML
1000 INJECTION, SOLUTION INTRAVENOUS ONCE
Refills: 0 | Status: COMPLETED | OUTPATIENT
Start: 2025-08-31 | End: 2025-08-31

## 2025-08-31 RX ORDER — LIDOCAINE HYDROCHLORIDE 20 MG/ML
1 JELLY TOPICAL
Qty: 7 | Refills: 0
Start: 2025-08-31 | End: 2025-09-06

## 2025-08-31 RX ORDER — ACETAMINOPHEN 500 MG/5ML
2 LIQUID (ML) ORAL
Qty: 40 | Refills: 0
Start: 2025-08-31 | End: 2025-09-04

## 2025-08-31 RX ORDER — AMLODIPINE BESYLATE 10 MG/1
5 TABLET ORAL ONCE
Refills: 0 | Status: COMPLETED | OUTPATIENT
Start: 2025-08-31 | End: 2025-08-31

## 2025-08-31 RX ADMIN — AMLODIPINE BESYLATE 5 MILLIGRAM(S): 10 TABLET ORAL at 07:52

## 2025-08-31 RX ADMIN — SODIUM CHLORIDE 1000 MILLILITER(S): 9 INJECTION, SOLUTION INTRAVENOUS at 07:54

## 2025-08-31 RX ADMIN — Medication 975 MILLIGRAM(S): at 08:04

## 2025-08-31 RX ADMIN — LIDOCAINE HYDROCHLORIDE 1 PATCH: 20 JELLY TOPICAL at 08:07

## 2025-08-31 RX ADMIN — Medication 0.1 MILLIGRAM(S): at 07:52

## 2025-08-31 RX ADMIN — TRAMADOL HYDROCHLORIDE 50 MILLIGRAM(S): 50 TABLET, FILM COATED ORAL at 08:04

## 2025-08-31 RX ADMIN — METHOCARBAMOL 750 MILLIGRAM(S): 500 TABLET, FILM COATED ORAL at 08:04

## 2025-08-31 RX ADMIN — KETOROLAC TROMETHAMINE 15 MILLIGRAM(S): 30 INJECTION, SOLUTION INTRAMUSCULAR; INTRAVENOUS at 04:43

## (undated) DEVICE — DRAPE LINGEMAN TUR

## (undated) DEVICE — NDL 20CM TROCAR

## (undated) DEVICE — FOLEY HOLDER STATLOCK 2 WAY ADULT

## (undated) DEVICE — SOL IRR POUR NS 0.9% 500ML

## (undated) DEVICE — DRSG TEGADERM 6 X 8"

## (undated) DEVICE — WARMING BLANKET UPPER ADULT

## (undated) DEVICE — TUBING CONNECTING FOR DRAINAGE BAG MALE / FEMALE 14FR 30CM

## (undated) DEVICE — POSITIONER CUSHION INSERT PRONE VIEW LG

## (undated) DEVICE — PREP BETADINE KIT

## (undated) DEVICE — SYR ASEPTO

## (undated) DEVICE — DRAPE EQUIPMENT BANDED BAG 30 X 30" (SHOWER CAP)

## (undated) DEVICE — SUT POLYSORB 2-0 18" V-20

## (undated) DEVICE — ACMI SELF-SEALING SEAL UP TO 7FR

## (undated) DEVICE — ADAPTER CHECK FLO 9FR STERILE

## (undated) DEVICE — SOL IRR BAG NS 0.9% 3000ML

## (undated) DEVICE — VENODYNE/SCD SLEEVE CALF MEDIUM

## (undated) DEVICE — NDL BIOPSY TROCAR TWO-PART 18G X 20CM

## (undated) DEVICE — DRAPE NEPHROSCOPY 72X118"

## (undated) DEVICE — IRR BULB PATHFINDER + 10"

## (undated) DEVICE — DRAPE U (CLEAR) 47 X 51" NON STERILE

## (undated) DEVICE — TUBING THERMADX UROLOGY

## (undated) DEVICE — Device

## (undated) DEVICE — NDL ACCESS NAVIGUIDE 18G 20CM

## (undated) DEVICE — SOL IRR BAG H2O 3000ML

## (undated) DEVICE — POSITIONER FOAM EGG CRATE ULNAR 2PCS (PINK)

## (undated) DEVICE — SOL IRR POUR H2O 1500ML

## (undated) DEVICE — GLV 8 PROTEXIS (WHITE)

## (undated) DEVICE — SPECIMEN CONTAINER 100ML

## (undated) DEVICE — DRAPE TOWEL BLUE 17" X 24"

## (undated) DEVICE — PRESSURE INFUSOR BAG 3000ML

## (undated) DEVICE — PACK CYSTO

## (undated) DEVICE — ELCTR BOVIE SUCTION 10FR

## (undated) DEVICE — AMPLATZ RENAL DILATOR 6FR-30FR X 20CM

## (undated) DEVICE — FOLEY CATH 2-WAY 20FR 5CC LATEX COUNCIL RED

## (undated) DEVICE — BOSTON SCIENTIFC ENCORE 26 INFLATOR

## (undated) DEVICE — POSITIONER FOAM HEADREST (PINK)

## (undated) DEVICE — GOWN TRIMAX LG

## (undated) DEVICE — TUBING SUCTION 20FT